# Patient Record
Sex: FEMALE | Race: WHITE
[De-identification: names, ages, dates, MRNs, and addresses within clinical notes are randomized per-mention and may not be internally consistent; named-entity substitution may affect disease eponyms.]

---

## 2017-08-04 ENCOUNTER — HOSPITAL ENCOUNTER (INPATIENT)
Dept: HOSPITAL 74 - JER | Age: 82
LOS: 6 days | Discharge: NURSING, REHAB OR LONG-TERM CARE | DRG: 964 | End: 2017-08-10
Attending: SPECIALIST | Admitting: SPECIALIST
Payer: COMMERCIAL

## 2017-08-04 VITALS — BODY MASS INDEX: 20.7 KG/M2

## 2017-08-04 DIAGNOSIS — D72.829: ICD-10-CM

## 2017-08-04 DIAGNOSIS — M13.0: ICD-10-CM

## 2017-08-04 DIAGNOSIS — S50.311A: ICD-10-CM
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
## 2017-08-04 DIAGNOSIS — I10: ICD-10-CM

## 2017-08-04 DIAGNOSIS — S06.300A: Primary | ICD-10-CM

## 2017-08-04 DIAGNOSIS — Z86.73: ICD-10-CM

## 2017-08-04 DIAGNOSIS — Z66: ICD-10-CM

## 2017-08-04 DIAGNOSIS — E87.1: ICD-10-CM

## 2017-08-04 DIAGNOSIS — L97.821: ICD-10-CM

## 2017-08-04 DIAGNOSIS — S72.414A: ICD-10-CM

## 2017-08-04 DIAGNOSIS — Z96.653: ICD-10-CM
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
## 2017-08-04 DIAGNOSIS — I87.8: ICD-10-CM

## 2017-08-04 DIAGNOSIS — L97.811: ICD-10-CM
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
## 2017-08-04 DIAGNOSIS — R19.7: ICD-10-CM

## 2017-08-04 LAB
ALBUMIN SERPL-MCNC: 3.6 G/DL (ref 3.4–5)
ALP SERPL-CCNC: 62 U/L (ref 45–117)
ALT SERPL-CCNC: 22 U/L (ref 12–78)
ANION GAP SERPL CALC-SCNC: 6 MMOL/L (ref 8–16)
AST SERPL-CCNC: 21 U/L (ref 15–37)
BASOPHILS # BLD: 0.7 % (ref 0–2)
BILIRUB SERPL-MCNC: 0.2 MG/DL (ref 0.2–1)
CALCIUM SERPL-MCNC: 8.9 MG/DL (ref 8.5–10.1)
CK SERPL-CCNC: 42 IU/L (ref 26–192)
CO2 SERPL-SCNC: 29 MMOL/L (ref 21–32)
CREAT SERPL-MCNC: 0.7 MG/DL (ref 0.55–1.02)
DEPRECATED RDW RBC AUTO: 12.3 % (ref 11.6–15.6)
EOSINOPHIL # BLD: 2.2 % (ref 0–4.5)
GLUCOSE SERPL-MCNC: 97 MG/DL (ref 74–106)
INR BLD: 0.98 (ref 0.82–1.09)
MCH RBC QN AUTO: 33.9 PG (ref 25.7–33.7)
MCHC RBC AUTO-ENTMCNC: 33.8 G/DL (ref 32–36)
MCV RBC: 100.1 FL (ref 80–96)
NEUTROPHILS # BLD: 73.9 % (ref 42.8–82.8)
PLATELET # BLD AUTO: 229 K/MM3 (ref 134–434)
PMV BLD: 8.5 FL (ref 7.5–11.1)
PROT SERPL-MCNC: 6.8 G/DL (ref 6.4–8.2)
PT PNL PPP: 10.8 SEC (ref 9.98–11.88)
TROPONIN I SERPL-MCNC: < 0.02 NG/ML (ref 0–0.05)
WBC # BLD AUTO: 9.9 K/MM3 (ref 4–10)

## 2017-08-04 NOTE — PDOC
History of Present Illness





- General


Chief Complaint: Injury


Stated Complaint: FALL


Time Seen by Provider: 08/04/17 21:13





- History of Present Illness


Initial Comments: 


08/04/17 22:14


85F w/ hx of b/l hip and knee replacements and polyarthritis presenting after 

fall. Pt reports that she fell down using her rollader to walk from her bed to 

bathroom. She denies any dizziness, SOB, nausea, or LOC before or after fall. 

She states that she hit the back of her head and right knee, and reports severe 

pain when flexing the right leg preventing her from walking. 





08/04/17 22:24








Past History





- Past Medical History


Allergies/Adverse Reactions: 


 Allergies











Allergy/AdvReac Type Severity Reaction Status Date / Time


 


No Known Allergies Allergy   Verified 08/04/17 21:26











Home Medications: 


Ambulatory Orders





Calcium Polycarbophil [Fibercon] 625 mg PO BID 06/09/14 


Gabapentin [Neurontin -] 400 mg PO Q8H 06/09/14 


Gluc HCl/Csana/Gly-Am-Gly,Mx/C [Cosamin Ds Capsule] 1 each PO BID 06/09/14 


Modafinil [Provigil] 200 mg PO BID 06/09/14 


Sennosides [Senna -] 1 mg PO HS 06/09/14 


Simethicone [Gas Relief] 80 mg PO QID 06/09/14 


Metaxalone 1 tab PO BID 11/07/14 


Hydromorphone [Dilaudid -] 1 tab PO BID PRN 12/02/16 


Amlodipine Besylate 2.5 mg PO DAILY 02/10/17 


Celebrex 100 mg PO BID 03/31/17 


Vitamin C 500 mg PO DAILY 03/31/17 


Vitamin D3 - 1 tab PO DAILY 03/31/17 


Zofran 4 mg PO TID PRN 03/31/17 


Acidoph/L.bulg/Bif.b/S.thermop [Patricia-Bid Caplet] 1 cap PO BID 05/12/17 








Anemia: No


Asthma: No


Cancer: No


Cardiac Disorders: No


CVA: No


COPD: No


CHF: No


Dementia: No


Diabetes: No


GI Disorders: No


 Disorders: No


HTN: Yes


Hypercholesterolemia: No


Liver Disease: No


Seizures: No


Thyroid Disease: No


Comment:: 





08/04/17 22:18


PMH:


hemochromatosis


HTN


arthritis


constipation


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





PSH:


cholecystectomy


b/l knee and hip replacements


PUD vagectomy


melanoma in-situ excision





Allergies: NKDA





Fam Hx: hemochromatosis in older brother











- Surgical History


Abdominal Surgery: Yes (apendectomy 10/2014)


Appendectomy: No


Cardiac Surgery: No


Cholecystectomy: No


Lung Surgery: No


Neurologic Surgery: No


Orthopedic Surgery: Yes (right hip replacement ./ rt knee replacement)





- Immunization History


Immunization Up to Date: Yes





- Psycho/Social/Smoking Cessation Hx


Suicidal Ideation: No


Smoking History: Never smoked


Have you smoked in the past 12 months: No





**Review of Systems





- Review of Systems


Comments:: 





08/04/17 22:19


GENERAL: No fever, chills, night sweats


HEAD, EYES, EARS, NOSE AND THROAT: No change in vision, ear pain, or sore throat


CARDIOVASCULAR: No chest pain or palpitations


RESPIRATORY: No cough, wheezing, or hemoptysis.


GASTROINTESTINAL: No nausea, vomiting, diarrhea, constipation, or blood in the 

stool.


GENITOURINARY: No dysuria, frequency, or urgency


MUSCULOSKELETAL: + joint swelling and pain. 


SKIN: wet growth on right lateral ankle bandaged up


ENDOCRINE: No increased thirst. No abnormal weight change


NEUROLOGIC: No dizziness,no  loss of consciousness





*Physical Exam





- Vital Signs


 Last Vital Signs











Temp Pulse Resp BP Pulse Ox


 


 97.8 F   75   20   151/87   96 


 


 08/04/17 21:24  08/04/17 21:24  08/04/17 21:24  08/04/17 21:24  08/04/17 21:24














- Physical Exam


Comments: 


08/04/17 22:22


GENERAL: Awake, alert, and fully oriented, in no acute distress


HEAD: swelling on posterior head


HEENT: PERRLA, EOMI, 


NECK: Normal ROM, supple, no lymphadenopathy, JVD, or masses


HEART: Regular rate and rhythm, normal S1 and S2, no murmurs, rubs or gallops, 

peripheral pulses normal and equal bilaterally.


LUNGS: CTAB, no wheezing, no rales


ABDOMEN: Soft, nontender, nondistended, normoactive bowel sounds.  No guarding, 

no rebound.  No masses


EXTREMITIES: right knee appears swollen compared to left, very tender to touch 

mostly on lateral side, unable to flex without severe pain. R elbow has minor 

abrasion. 


NEUROLOGICAL: Cranial nerves II through XII grossly intact.  Normal speech, no 

focal sensorimotor deficits





08/04/17 22:26








ED Treatment Course





- LABORATORY


CBC & Chemistry Diagram: 


 08/04/17 21:59





 08/04/17 21:59





- RADIOLOGY


Radiology Studies Ordered: 














 Category Date Time Status


 


 HEAD CT WITHOUT CONTRAST [CT] Stat CT Scan  08/04/17 21:55 Ordered


 


 KNEE 4 POS-RIGHT [RAD] Stat Radiology  08/04/17 21:54 Taken














Medical Decision Making





- Medical Decision Making


08/04/17 22:24


85F w/ hx of b/l hip and knee replacements and polyarthritis presenting after 

falling on head and right knee. Likely due to instability of joints. r/o 

cardiac causes of fall, intracranial bleeding, and right knee fracture. 





-head CT: shows a small hyperdensity suspicious for acute hemorrhage. 


-right knee XR: can't rule out fracture, so ordered knee CT


-CBC: wnl


-ferritin


-CMP: wnl


-EKG: normal sinus rhythm


-CT Knee: showed distal femoral condyle fracture and hemarthrosis





-spoke to Dr. Ba, will admit to monitor floor.


-consult orthopedic surgery for knee fracture


-touched base with neurosurgery who will see pt in morning





08/04/17 23:38








08/05/17 02:06








*DC/Admit/Observation/Transfer


Diagnosis at time of Disposition: 


 Knee fracture, right





- Discharge Dispostion


Condition at time of disposition: Stable


Admit: Yes


Decision to Admit order Date/Time: 





08/05/17 02:12

## 2017-08-05 LAB
ALBUMIN SERPL-MCNC: 3.2 G/DL (ref 3.4–5)
ALP SERPL-CCNC: 69 U/L (ref 45–117)
ALT SERPL-CCNC: 17 U/L (ref 12–78)
ANION GAP SERPL CALC-SCNC: 8 MMOL/L (ref 8–16)
AST SERPL-CCNC: 19 U/L (ref 15–37)
BILIRUB SERPL-MCNC: 0.5 MG/DL (ref 0.2–1)
CALCIUM SERPL-MCNC: 8.4 MG/DL (ref 8.5–10.1)
CO2 SERPL-SCNC: 31 MMOL/L (ref 21–32)
CREAT SERPL-MCNC: 0.6 MG/DL (ref 0.55–1.02)
DEPRECATED RDW RBC AUTO: 11.9 % (ref 11.6–15.6)
GLUCOSE SERPL-MCNC: 100 MG/DL (ref 74–106)
MCH RBC QN AUTO: 33.5 PG (ref 25.7–33.7)
MCHC RBC AUTO-ENTMCNC: 33.5 G/DL (ref 32–36)
MCV RBC: 100.2 FL (ref 80–96)
PLATELET # BLD AUTO: 171 K/MM3 (ref 134–434)
PMV BLD: 7.9 FL (ref 7.5–11.1)
PROT SERPL-MCNC: 6.2 G/DL (ref 6.4–8.2)
WBC # BLD AUTO: 7.7 K/MM3 (ref 4–10)

## 2017-08-05 PROCEDURE — 2W3QXYZ IMMOBILIZATION OF RIGHT LOWER LEG USING OTHER DEVICE: ICD-10-PCS

## 2017-08-05 RX ADMIN — SODIUM CHLORIDE SCH MLS/HR: 9 INJECTION, SOLUTION INTRAVENOUS at 21:00

## 2017-08-05 RX ADMIN — SIMETHICONE CHEW TAB 80 MG SCH: 80 TABLET ORAL at 18:43

## 2017-08-05 RX ADMIN — AMLODIPINE BESYLATE SCH MG: 2.5 TABLET ORAL at 15:24

## 2017-08-05 RX ADMIN — SIMETHICONE CHEW TAB 80 MG SCH: 80 TABLET ORAL at 17:25

## 2017-08-05 RX ADMIN — GABAPENTIN SCH MG: 400 CAPSULE ORAL at 15:24

## 2017-08-05 RX ADMIN — HYDROMORPHONE HYDROCHLORIDE PRN MG: 2 INJECTION, SOLUTION INTRAMUSCULAR; INTRAVENOUS; SUBCUTANEOUS at 21:48

## 2017-08-05 RX ADMIN — SIMETHICONE CHEW TAB 80 MG SCH MG: 80 TABLET ORAL at 21:51

## 2017-08-05 RX ADMIN — GENTAMICIN SULFATE SCH APPLIC: 1 CREAM TOPICAL at 17:23

## 2017-08-05 RX ADMIN — CELECOXIB SCH MG: 100 CAPSULE ORAL at 21:51

## 2017-08-05 RX ADMIN — SODIUM CHLORIDE SCH MLS/HR: 9 INJECTION, SOLUTION INTRAVENOUS at 13:30

## 2017-08-05 RX ADMIN — BACITRACIN ZINC SCH APPLIC: 500 OINTMENT TOPICAL at 17:24

## 2017-08-05 RX ADMIN — SIMETHICONE CHEW TAB 80 MG SCH MG: 80 TABLET ORAL at 15:24

## 2017-08-05 RX ADMIN — SENNOSIDES SCH TAB: 8.6 TABLET, FILM COATED ORAL at 21:51

## 2017-08-05 RX ADMIN — GABAPENTIN SCH MG: 400 CAPSULE ORAL at 21:51

## 2017-08-05 RX ADMIN — HYDROMORPHONE HYDROCHLORIDE PRN MG: 2 INJECTION, SOLUTION INTRAMUSCULAR; INTRAVENOUS; SUBCUTANEOUS at 14:24

## 2017-08-05 NOTE — CON.ORTH
Consult


Reason for Consultation:: right distal femur fx- s/p tkr





- Past Medical History


...Pregnant: No





- Alcohol/Substance Use


Hx Alcohol Use: No





- Smoking History


Smoking history: Never smoked


Have you smoked in the past 12 months: No





Home Medications





- Allergies


Allergies/Adverse Reactions: 


 Allergies











Allergy/AdvReac Type Severity Reaction Status Date / Time


 


No Known Allergies Allergy   Verified 08/04/17 21:26














- Home Medications


Home Medications: 


Ambulatory Orders





Calcium Polycarbophil [Fibercon] 625 mg PO BID 06/09/14 


Gabapentin [Neurontin -] 400 mg PO Q8H 06/09/14 


Gluc HCl/Csana/Gly-Am-Gly,Mx/C [Cosamin Ds Capsule] 1 each PO BID 06/09/14 


Modafinil [Provigil] 200 mg PO BID 06/09/14 


Sennosides [Senna -] 1 mg PO HS 06/09/14 


Simethicone [Gas Relief] 80 mg PO QID 06/09/14 


Metaxalone 1 tab PO BID 11/07/14 


Hydromorphone [Dilaudid -] 1 tab PO BID PRN 12/02/16 


Amlodipine Besylate 2.5 mg PO DAILY 02/10/17 


Celebrex 100 mg PO BID 03/31/17 


Vitamin C 500 mg PO DAILY 03/31/17 


Vitamin D3 - 1 tab PO DAILY 03/31/17 


Zofran 4 mg PO TID PRN 03/31/17 


Acidoph/L.bulg/Bif.b/S.thermop [Patricia-Bid Caplet] 1 cap PO BID 05/12/17 











Physical Exam for Ortho


Vital Signs: 


 Vital Signs











Temperature  97.9 F   08/05/17 06:16


 


Pulse Rate  70   08/05/17 06:16


 


Respiratory Rate  18   08/05/17 10:00


 


Blood Pressure  119/57   08/05/17 06:16


 


O2 Sat by Pulse Oximetry (%)  95   08/05/17 10:00











Labs: 


 INR, PTT











INR  0.98  (0.82-1.09)   08/04/17  21:59    














- Lower Extremity


Knee: Yes: Right, Limited ROM, Pain, Swelling, Tenderness, Other (nvi)





Imaging





- Results


X-ray: Report Reviewed, Image Reviewed


Cat Scan: Report Reviewed, Image Reviewed





Assessment/Plan


85F w/ hx of b/l hip and knee replacements s/p fall yesterday. Pt reports that 

she fell down using her rollader to walk from her bed to bathroom. She denies 

any dizziness, SOB, nausea, or LOC before or after fall. h/o right tkr 2006 by 

Dr. Rodriguez.





a/p- right nondisplaced periprosthetic distal femur fx


Risks and benefits were d/w pt in detail


No surgical intervention at the present time


Knee immobilizer at all times- please check for skin breakdown daily as pt has h

/o non-healing ulcers


NWB right LE


dvt ppx


pain control


will follow


d/w DR. Watts

## 2017-08-05 NOTE — HP
Admitting History and Physical





- Primary Care Physician


PCP: Indra Andujar





- Admission


Chief Complaint: Right knee pain.  Fall, head trauma


History of Present Illness: 


Pt is a resident of [facility], walking in her room yesterday with walker, 

turned and lost her footing and fell; she  hit her head. Pt was c/o of 

significant pain in her right knee, not able to stand or walk. Pt was sent to 

ER where was found to ICB and fracture of the right knee. Pt was admitted to 

monitor bed.


History Source: Patient





- Past Medical History


CNS: Yes: CVA


...Pregnant: No


Dermatology: Yes: Other (right and left leg ulcers)





- Advance Directives


Advance Directives: Yes: Health Care Proxy, DNR, MOLST





- Smoking History


Smoking history: Never smoked


Have you smoked in the past 12 months: No





- Alcohol/Substance Use


Hx Alcohol Use: No





Home Medications





- Allergies


Allergies/Adverse Reactions: 


 Allergies











Allergy/AdvReac Type Severity Reaction Status Date / Time


 


No Known Allergies Allergy   Verified 08/04/17 21:26














- Home Medications


Home Medications: 


Ambulatory Orders





Calcium Polycarbophil [Fibercon] 625 mg PO BID 06/09/14 


Gabapentin [Neurontin -] 400 mg PO Q8H 06/09/14 


Gluc HCl/Csana/Gly-Am-Gly,Mx/C [Cosamin Ds Capsule] 1 each PO BID 06/09/14 


Modafinil [Provigil] 200 mg PO BID 06/09/14 


Sennosides [Senna -] 1 mg PO HS 06/09/14 


Simethicone [Gas Relief] 80 mg PO QID 06/09/14 


Metaxalone 1 tab PO BID 11/07/14 


Hydromorphone [Dilaudid -] 1 tab PO BID PRN 12/02/16 


Amlodipine Besylate 2.5 mg PO DAILY 02/10/17 


Celebrex 100 mg PO BID 03/31/17 


Vitamin C 500 mg PO DAILY 03/31/17 


Vitamin D3 - 1 tab PO DAILY 03/31/17 


Zofran 4 mg PO TID PRN 03/31/17 


Acidoph/L.bulg/Bif.b/S.thermop [Patricia-Bid Caplet] 1 cap PO BID 05/12/17 











Review of Systems





- Review of Systems


Constitutional: denies: Chills, Fever


Eyes: denies: Blurred Vision, Double Vision


HENT: denies: Difficult Swallowing, Epistaxis, Hearing Loss, Throat Pain


Neck: denies: Pain on Movement, Stiffness


Cardiovascular: denies: Chest Pain, Edema, Palpitations


Respiratory: denies: Cough, SOB, Wheezing


Gastrointestinal: denies: Abdominal Pain, Constipation, Diarrhea


Genitourinary: denies: Burning, Discharge, Frequency


Musculoskeletal: reports: Other (right knee pain bilat shoulder pain (old)).  

denies: Back Pain


Neurological: denies: Change in LOC, Change in Speech, Confusion, Dizziness, 

Headache, Weakness


Endocrine: denies: Excessive Sweating, Intolerance to Cold


Hematology/Lymphatic: denies: Easily Bruised, Excessive Bleeding


Psychiatric: denies: Anxiety, Depression





Physical Examination


Vital Signs: 


 Vital Signs











Temperature  97.9 F   08/05/17 06:16


 


Pulse Rate  70   08/05/17 06:16


 


Respiratory Rate  18   08/05/17 10:00


 


Blood Pressure  119/57   08/05/17 06:16


 


O2 Sat by Pulse Oximetry (%)  95   08/05/17 10:00











Constitutional: Yes: No Distress, Calm


Eyes: Yes: Conjunctiva Clear, PERRL


HENT: Yes: Normocephalic.  No: Epistaxis, Rhinnorhea


Neck: Yes: Trachea Midline.  No: Lymphadenopathy


Cardiovascular: Yes: Regular Rate and Rhythm, S1, S2


Respiratory: Yes: Regular, CTA Bilaterally.  No: Rales


Gastrointestinal: Yes: Normal Bowel Sounds, Soft.  No: Palpable Mass, Tenderness


...Rectal Exam: Yes: Deferred


Renal/: No: CVA Tenderness - Left, CVA Tenderness - Right


Breast(s): Yes: Other (deferred)


Musculoskeletal: Yes: Other (right knee swallon, with)


Edema: No


Neurological: Yes: Alert, Oriented, Cran Nerves II-XII Intact


Labs: 


reviewed





Imaging





- Results


Cat Scan: Report Reviewed





Problem List





- Problems


(1) Knee fracture, right


Code(s): [id number] - 





(2) ICB (intracranial bleed)


Code(s): I62.9 - NONTRAUMATIC INTRACRANIAL HEMORRHAGE, UNSPECIFIED





(3) CVA (cerebral vascular accident)


Code(s): I63.9 - CEREBRAL INFARCTION, UNSPECIFIED   





(4) Bilateral leg ulcer


Code(s): L97.919 - NON-PRS CHRONIC ULC UNSP PRT OF R LOW LEG W UNSP SEVERITY


L97.929 - NON-PRS CHRONIC ULC UNSP PRT OF L LOW LEG W UNSP SEVERITY   








Assessment/Plan


Neuro SX consult


Ortho consult


NPO untill clear by surgeons


IVF


Repeate labs


Repeate Head CT scan

## 2017-08-05 NOTE — CONSULT
Consult - text type





- Consultation


Consultation Note: 


Asked to see this 85 year old  female who had a fall yesterday at her 

Skilled Nursing Facility. Patient struck her head. Aside from Orthopaedic 

injury to Right lower extremity, patient is at her Neurological baseline and 

was bright and alert upon my examination. Head CT with focal hyperdenisity in 

the region of the Right Caudate head. Repeat CT does not demonstrate 

significant change/progression. At this point, I do not feel that any acute 

Neurosurgical intervention is indicated. Patient may resume normal diet and 

does not require additional imaging from Neurosurgery standpoint.

## 2017-08-06 LAB
ALBUMIN SERPL-MCNC: 2.7 G/DL (ref 3.4–5)
ALP SERPL-CCNC: 56 U/L (ref 45–117)
ALT SERPL-CCNC: 15 U/L (ref 12–78)
ANION GAP SERPL CALC-SCNC: 7 MMOL/L (ref 8–16)
AST SERPL-CCNC: 15 U/L (ref 15–37)
BILIRUB SERPL-MCNC: 0.5 MG/DL (ref 0.2–1)
CALCIUM SERPL-MCNC: 8.1 MG/DL (ref 8.5–10.1)
CO2 SERPL-SCNC: 28 MMOL/L (ref 21–32)
CREAT SERPL-MCNC: 0.7 MG/DL (ref 0.55–1.02)
DEPRECATED RDW RBC AUTO: 11.9 % (ref 11.6–15.6)
GLUCOSE SERPL-MCNC: 86 MG/DL (ref 74–106)
MCH RBC QN AUTO: 33.8 PG (ref 25.7–33.7)
MCHC RBC AUTO-ENTMCNC: 33.3 G/DL (ref 32–36)
MCV RBC: 101.5 FL (ref 80–96)
PLATELET # BLD AUTO: 165 K/MM3 (ref 134–434)
PMV BLD: 8.4 FL (ref 7.5–11.1)
PROT SERPL-MCNC: 5.2 G/DL (ref 6.4–8.2)
WBC # BLD AUTO: 8.3 K/MM3 (ref 4–10)

## 2017-08-06 RX ADMIN — OXYCODONE HYDROCHLORIDE AND ACETAMINOPHEN SCH MG: 500 TABLET ORAL at 10:47

## 2017-08-06 RX ADMIN — BACITRACIN ZINC SCH APPLIC: 500 OINTMENT TOPICAL at 14:39

## 2017-08-06 RX ADMIN — SIMETHICONE CHEW TAB 80 MG SCH MG: 80 TABLET ORAL at 18:34

## 2017-08-06 RX ADMIN — HYDROMORPHONE HYDROCHLORIDE PRN MG: 2 INJECTION, SOLUTION INTRAMUSCULAR; INTRAVENOUS; SUBCUTANEOUS at 06:16

## 2017-08-06 RX ADMIN — GABAPENTIN SCH MG: 400 CAPSULE ORAL at 21:42

## 2017-08-06 RX ADMIN — HYDROMORPHONE HYDROCHLORIDE PRN MG: 2 INJECTION, SOLUTION INTRAMUSCULAR; INTRAVENOUS; SUBCUTANEOUS at 18:37

## 2017-08-06 RX ADMIN — AMLODIPINE BESYLATE SCH MG: 2.5 TABLET ORAL at 10:47

## 2017-08-06 RX ADMIN — SIMETHICONE CHEW TAB 80 MG SCH MG: 80 TABLET ORAL at 10:47

## 2017-08-06 RX ADMIN — GENTAMICIN SULFATE SCH APPLIC: 1 CREAM TOPICAL at 14:39

## 2017-08-06 RX ADMIN — CELECOXIB SCH MG: 100 CAPSULE ORAL at 10:47

## 2017-08-06 RX ADMIN — SIMETHICONE CHEW TAB 80 MG SCH MG: 80 TABLET ORAL at 21:42

## 2017-08-06 RX ADMIN — HYDROMORPHONE HYDROCHLORIDE PRN MG: 2 INJECTION, SOLUTION INTRAMUSCULAR; INTRAVENOUS; SUBCUTANEOUS at 12:22

## 2017-08-06 RX ADMIN — SENNOSIDES SCH TAB: 8.6 TABLET, FILM COATED ORAL at 21:42

## 2017-08-06 RX ADMIN — SIMETHICONE CHEW TAB 80 MG SCH MG: 80 TABLET ORAL at 14:38

## 2017-08-06 RX ADMIN — GABAPENTIN SCH MG: 400 CAPSULE ORAL at 14:39

## 2017-08-06 RX ADMIN — SODIUM CHLORIDE SCH MLS/HR: 9 INJECTION, SOLUTION INTRAVENOUS at 14:39

## 2017-08-06 RX ADMIN — GABAPENTIN SCH MG: 400 CAPSULE ORAL at 06:16

## 2017-08-06 RX ADMIN — CELECOXIB SCH MG: 100 CAPSULE ORAL at 21:43

## 2017-08-06 NOTE — EKG
Test Reason : 

Blood Pressure : ***/*** mmHG

Vent. Rate : 069 BPM     Atrial Rate : 069 BPM

   P-R Int : 136 ms          QRS Dur : 094 ms

    QT Int : 394 ms       P-R-T Axes : 023 -29 030 degrees

   QTc Int : 422 ms

 

NORMAL SINUS RHYTHM

VOLTAGE CRITERIA FOR LEFT VENTRICULAR HYPERTROPHY

ABNORMAL ECG

NO PREVIOUS ECGS AVAILABLE

Confirmed by GIAN DE LA VEGA MD ([id number]) on 8/6/2017 8:18:26 AM

 

Referred By:             Confirmed By:GIAN DE LA VEGA MD

## 2017-08-07 LAB
ANION GAP SERPL CALC-SCNC: 7 MMOL/L (ref 8–16)
BASOPHILS # BLD: 0.5 % (ref 0–2)
CALCIUM SERPL-MCNC: 7.7 MG/DL (ref 8.5–10.1)
CO2 SERPL-SCNC: 25 MMOL/L (ref 21–32)
CREAT SERPL-MCNC: 0.6 MG/DL (ref 0.55–1.02)
DEPRECATED RDW RBC AUTO: 12.2 % (ref 11.6–15.6)
DEPRECATED RDW RBC AUTO: 12.3 % (ref 11.6–15.6)
EOSINOPHIL # BLD: 1.8 % (ref 0–4.5)
GLUCOSE SERPL-MCNC: 88 MG/DL (ref 74–106)
MCH RBC QN AUTO: 34 PG (ref 25.7–33.7)
MCH RBC QN AUTO: 34.3 PG (ref 25.7–33.7)
MCHC RBC AUTO-ENTMCNC: 33.4 G/DL (ref 32–36)
MCHC RBC AUTO-ENTMCNC: 33.8 G/DL (ref 32–36)
MCV RBC: 101.5 FL (ref 80–96)
MCV RBC: 101.9 FL (ref 80–96)
NEUTROPHILS # BLD: 86 % (ref 42.8–82.8)
PLATELET # BLD AUTO: 159 K/MM3 (ref 134–434)
PLATELET # BLD AUTO: 160 K/MM3 (ref 134–434)
PMV BLD: 8.6 FL (ref 7.5–11.1)
PMV BLD: 8.7 FL (ref 7.5–11.1)
WBC # BLD AUTO: 12 K/MM3 (ref 4–10)
WBC # BLD AUTO: 9.5 K/MM3 (ref 4–10)

## 2017-08-07 RX ADMIN — HEPARIN SODIUM SCH UNIT: 5000 INJECTION, SOLUTION INTRAVENOUS; SUBCUTANEOUS at 13:14

## 2017-08-07 RX ADMIN — GABAPENTIN SCH MG: 400 CAPSULE ORAL at 21:29

## 2017-08-07 RX ADMIN — HYDROMORPHONE HYDROCHLORIDE PRN MG: 2 INJECTION, SOLUTION INTRAMUSCULAR; INTRAVENOUS; SUBCUTANEOUS at 01:06

## 2017-08-07 RX ADMIN — SIMETHICONE CHEW TAB 80 MG SCH MG: 80 TABLET ORAL at 10:37

## 2017-08-07 RX ADMIN — SIMETHICONE CHEW TAB 80 MG SCH MG: 80 TABLET ORAL at 21:29

## 2017-08-07 RX ADMIN — SIMETHICONE CHEW TAB 80 MG SCH MG: 80 TABLET ORAL at 18:47

## 2017-08-07 RX ADMIN — SODIUM CHLORIDE SCH MLS/HR: 9 INJECTION, SOLUTION INTRAVENOUS at 13:11

## 2017-08-07 RX ADMIN — CELECOXIB SCH MG: 100 CAPSULE ORAL at 10:38

## 2017-08-07 RX ADMIN — AMLODIPINE BESYLATE SCH MG: 2.5 TABLET ORAL at 10:37

## 2017-08-07 RX ADMIN — CELECOXIB SCH MG: 100 CAPSULE ORAL at 21:29

## 2017-08-07 RX ADMIN — GENTAMICIN SULFATE SCH APPLIC: 1 CREAM TOPICAL at 10:37

## 2017-08-07 RX ADMIN — SENNOSIDES SCH: 8.6 TABLET, FILM COATED ORAL at 21:45

## 2017-08-07 RX ADMIN — GABAPENTIN SCH MG: 400 CAPSULE ORAL at 06:10

## 2017-08-07 RX ADMIN — BACITRACIN ZINC SCH APPLIC: 500 OINTMENT TOPICAL at 10:37

## 2017-08-07 RX ADMIN — GABAPENTIN SCH MG: 400 CAPSULE ORAL at 13:39

## 2017-08-07 RX ADMIN — SIMETHICONE CHEW TAB 80 MG SCH MG: 80 TABLET ORAL at 13:39

## 2017-08-07 RX ADMIN — OXYCODONE HYDROCHLORIDE AND ACETAMINOPHEN SCH MG: 500 TABLET ORAL at 10:37

## 2017-08-07 NOTE — CONSULT
Consult





- Past Medical History


CNS: Yes: CVA


...Pregnant: No


Dermatology: Yes: Other (right and left leg ulcers)





- Alcohol/Substance Use


Hx Alcohol Use: No





- Smoking History


Smoking history: Never smoked


Have you smoked in the past 12 months: No





Home Medications





- Allergies


Allergies/Adverse Reactions: 


 Allergies











Allergy/AdvReac Type Severity Reaction Status Date / Time


 


No Known Allergies Allergy   Verified 08/04/17 21:26














- Home Medications


Home Medications: 


Ambulatory Orders





Calcium Polycarbophil [Fibercon] 625 mg PO BID 06/09/14 


Gabapentin [Neurontin -] 400 mg PO Q8H 06/09/14 


Gluc HCl/Csana/Gly-Am-Gly,Mx/C [Cosamin Ds Capsule] 1 each PO BID 06/09/14 


Modafinil [Provigil] 200 mg PO BID 06/09/14 


Sennosides [Senna -] 1 mg PO HS 06/09/14 


Simethicone [Gas Relief] 80 mg PO QID 06/09/14 


Metaxalone 1 tab PO BID 11/07/14 


Hydromorphone [Dilaudid -] 1 tab PO BID PRN 12/02/16 


Amlodipine Besylate 2.5 mg PO DAILY 02/10/17 


Celebrex 100 mg PO BID 03/31/17 


Vitamin C 500 mg PO DAILY 03/31/17 


Vitamin D3 - 1 tab PO DAILY 03/31/17 


Zofran 4 mg PO TID PRN 03/31/17 


Acidoph/L.bulg/Bif.b/S.thermop [Patricia-Bid Caplet] 1 cap PO BID 05/12/17 











Physical Exam


Vital Signs: 


 Vital Signs











Temperature  99.5 F   08/07/17 18:00


 


Pulse Rate  95 H  08/07/17 18:00


 


Respiratory Rate  18   08/07/17 18:00


 


Blood Pressure  127/54   08/07/17 18:00


 


O2 Sat by Pulse Oximetry (%)  100   08/07/17 10:26











Labs: 


 CBC, BMP





 08/07/17 18:00 





 08/07/17 05:15 











Assessment/Plan


VAscular Surgery 





85F w/ hx of b/l hip and knee replacements and polyarthritis presenting after 

fall. Pt reports that she fell down using her rollader to walk from her bed to 

bathroom. She denies any dizziness, SOB, nausea, or LOC before or after fall. 

She states that she hit the back of her head and right knee, and reports severe 

pain when flexing the right leg preventing her from walking. 





08/04/17 22:24








Past History





- Past Medical History


Allergies/Adverse Reactions: 


 Allergies











Allergy/AdvReac Type Severity Reaction Status Date / Time


 


No Known Allergies Allergy   Verified 08/04/17 21:26











Home Medications: 


Ambulatory Orders





Calcium Polycarbophil [Fibercon] 625 mg PO BID 06/09/14 


Gabapentin [Neurontin -] 400 mg PO Q8H 06/09/14 


Gluc HCl/Csana/Gly-Am-Gly,Mx/C [Cosamin Ds Capsule] 1 each PO BID 06/09/14 


Modafinil [Provigil] 200 mg PO BID 06/09/14 


Sennosides [Senna -] 1 mg PO HS 06/09/14 


Simethicone [Gas Relief] 80 mg PO QID 06/09/14 


Metaxalone 1 tab PO BID 11/07/14 


Hydromorphone [Dilaudid -] 1 tab PO BID PRN 12/02/16 


Amlodipine Besylate 2.5 mg PO DAILY 02/10/17 


Celebrex 100 mg PO BID 03/31/17 


Vitamin C 500 mg PO DAILY 03/31/17 


Vitamin D3 - 1 tab PO DAILY 03/31/17 


Zofran 4 mg PO TID PRN 03/31/17 


Acidoph/L.bulg/Bif.b/S.thermop [Patricia-Bid Caplet] 1 cap PO BID 05/12/17 





PE


Head - NC/At


Lung - CTA


Heart - RRR


abd - soft,nt,nd


ext - left leg wound 


clean, pink, granulation. 


Wound on left leg -- recently diagnosed by dermatology to be skin cancer and 

needs to 


be removed as per pt. 





A/P 


Left lower ext wound . 


1. gentamycin daily to wound. 


2. Wound on left leg- found to be skin cancer. Needs to be removed by derm. 





Avila Hoyos DO

## 2017-08-08 LAB
DEPRECATED RDW RBC AUTO: 11.9 % (ref 11.6–15.6)
MCH RBC QN AUTO: 34.2 PG (ref 25.7–33.7)
MCHC RBC AUTO-ENTMCNC: 34 G/DL (ref 32–36)
MCV RBC: 100.3 FL (ref 80–96)
PLATELET # BLD AUTO: 179 K/MM3 (ref 134–434)
PMV BLD: 8.7 FL (ref 7.5–11.1)
WBC # BLD AUTO: 8.9 K/MM3 (ref 4–10)

## 2017-08-08 RX ADMIN — SIMETHICONE CHEW TAB 80 MG SCH MG: 80 TABLET ORAL at 13:10

## 2017-08-08 RX ADMIN — CELECOXIB SCH MG: 100 CAPSULE ORAL at 10:29

## 2017-08-08 RX ADMIN — SIMETHICONE CHEW TAB 80 MG SCH MG: 80 TABLET ORAL at 10:28

## 2017-08-08 RX ADMIN — GABAPENTIN SCH MG: 400 CAPSULE ORAL at 05:47

## 2017-08-08 RX ADMIN — SODIUM CHLORIDE SCH MLS/HR: 9 INJECTION, SOLUTION INTRAVENOUS at 13:11

## 2017-08-08 RX ADMIN — SIMETHICONE CHEW TAB 80 MG SCH MG: 80 TABLET ORAL at 18:19

## 2017-08-08 RX ADMIN — HYDROMORPHONE HYDROCHLORIDE PRN MG: 2 INJECTION, SOLUTION INTRAMUSCULAR; INTRAVENOUS; SUBCUTANEOUS at 06:43

## 2017-08-08 RX ADMIN — CELECOXIB SCH MG: 100 CAPSULE ORAL at 21:43

## 2017-08-08 RX ADMIN — OXYCODONE HYDROCHLORIDE AND ACETAMINOPHEN SCH MG: 500 TABLET ORAL at 10:28

## 2017-08-08 RX ADMIN — GABAPENTIN SCH MG: 400 CAPSULE ORAL at 13:10

## 2017-08-08 RX ADMIN — SENNOSIDES SCH TAB: 8.6 TABLET, FILM COATED ORAL at 21:05

## 2017-08-08 RX ADMIN — HYDROMORPHONE HYDROCHLORIDE PRN MG: 2 INJECTION, SOLUTION INTRAMUSCULAR; INTRAVENOUS; SUBCUTANEOUS at 13:13

## 2017-08-08 RX ADMIN — BACITRACIN ZINC SCH APPLIC: 500 OINTMENT TOPICAL at 10:29

## 2017-08-08 RX ADMIN — HYDROMORPHONE HYDROCHLORIDE PRN MG: 2 INJECTION, SOLUTION INTRAMUSCULAR; INTRAVENOUS; SUBCUTANEOUS at 18:24

## 2017-08-08 RX ADMIN — GENTAMICIN SULFATE SCH APPLIC: 1 CREAM TOPICAL at 11:00

## 2017-08-08 RX ADMIN — HEPARIN SODIUM SCH UNIT: 5000 INJECTION, SOLUTION INTRAVENOUS; SUBCUTANEOUS at 10:29

## 2017-08-08 RX ADMIN — GABAPENTIN SCH MG: 400 CAPSULE ORAL at 21:05

## 2017-08-08 RX ADMIN — HEPARIN SODIUM SCH UNIT: 5000 INJECTION, SOLUTION INTRAVENOUS; SUBCUTANEOUS at 21:04

## 2017-08-08 RX ADMIN — AMLODIPINE BESYLATE SCH MG: 2.5 TABLET ORAL at 10:28

## 2017-08-08 RX ADMIN — SIMETHICONE CHEW TAB 80 MG SCH MG: 80 TABLET ORAL at 21:05

## 2017-08-09 LAB
ANION GAP SERPL CALC-SCNC: 10 MMOL/L (ref 8–16)
ANION GAP SERPL CALC-SCNC: 6 MMOL/L (ref 8–16)
CALCIUM SERPL-MCNC: 8.2 MG/DL (ref 8.5–10.1)
CALCIUM SERPL-MCNC: 8.3 MG/DL (ref 8.5–10.1)
CO2 SERPL-SCNC: 29 MMOL/L (ref 21–32)
CO2 SERPL-SCNC: 31 MMOL/L (ref 21–32)
CREAT SERPL-MCNC: 0.4 MG/DL (ref 0.55–1.02)
CREAT SERPL-MCNC: 0.5 MG/DL (ref 0.55–1.02)
DEPRECATED RDW RBC AUTO: 12.1 % (ref 11.6–15.6)
GLUCOSE SERPL-MCNC: 104 MG/DL (ref 74–106)
GLUCOSE SERPL-MCNC: 98 MG/DL (ref 74–106)
MAGNESIUM SERPL-MCNC: 1.9 MG/DL (ref 1.8–2.4)
MCH RBC QN AUTO: 33.2 PG (ref 25.7–33.7)
MCHC RBC AUTO-ENTMCNC: 33.2 G/DL (ref 32–36)
MCV RBC: 99.9 FL (ref 80–96)
PLATELET # BLD AUTO: 220 K/MM3 (ref 134–434)
PMV BLD: 8.4 FL (ref 7.5–11.1)
WBC # BLD AUTO: 8 K/MM3 (ref 4–10)

## 2017-08-09 RX ADMIN — SIMETHICONE CHEW TAB 80 MG SCH: 80 TABLET ORAL at 20:07

## 2017-08-09 RX ADMIN — GABAPENTIN SCH MG: 400 CAPSULE ORAL at 21:30

## 2017-08-09 RX ADMIN — BACITRACIN ZINC SCH APPLIC: 500 OINTMENT TOPICAL at 09:38

## 2017-08-09 RX ADMIN — SIMETHICONE CHEW TAB 80 MG SCH MG: 80 TABLET ORAL at 09:41

## 2017-08-09 RX ADMIN — HEPARIN SODIUM SCH UNIT: 5000 INJECTION, SOLUTION INTRAVENOUS; SUBCUTANEOUS at 09:40

## 2017-08-09 RX ADMIN — SIMETHICONE CHEW TAB 80 MG SCH MG: 80 TABLET ORAL at 21:30

## 2017-08-09 RX ADMIN — HEPARIN SODIUM SCH UNIT: 5000 INJECTION, SOLUTION INTRAVENOUS; SUBCUTANEOUS at 21:31

## 2017-08-09 RX ADMIN — AMLODIPINE BESYLATE SCH MG: 2.5 TABLET ORAL at 09:41

## 2017-08-09 RX ADMIN — GENTAMICIN SULFATE SCH APPLIC: 1 CREAM TOPICAL at 09:39

## 2017-08-09 RX ADMIN — GABAPENTIN SCH MG: 400 CAPSULE ORAL at 13:23

## 2017-08-09 RX ADMIN — SIMETHICONE CHEW TAB 80 MG SCH MG: 80 TABLET ORAL at 17:30

## 2017-08-09 RX ADMIN — GABAPENTIN SCH MG: 400 CAPSULE ORAL at 05:46

## 2017-08-09 RX ADMIN — CELECOXIB SCH MG: 100 CAPSULE ORAL at 21:37

## 2017-08-09 RX ADMIN — SENNOSIDES SCH TAB: 8.6 TABLET, FILM COATED ORAL at 21:31

## 2017-08-09 RX ADMIN — OXYCODONE HYDROCHLORIDE AND ACETAMINOPHEN SCH MG: 500 TABLET ORAL at 09:41

## 2017-08-09 RX ADMIN — CELECOXIB SCH MG: 100 CAPSULE ORAL at 17:30

## 2017-08-10 VITALS — SYSTOLIC BLOOD PRESSURE: 147 MMHG | TEMPERATURE: 98.5 F | HEART RATE: 97 BPM | DIASTOLIC BLOOD PRESSURE: 69 MMHG

## 2017-08-10 LAB
ANION GAP SERPL CALC-SCNC: 7 MMOL/L (ref 8–16)
CALCIUM SERPL-MCNC: 8.3 MG/DL (ref 8.5–10.1)
CO2 SERPL-SCNC: 33 MMOL/L (ref 21–32)
CREAT SERPL-MCNC: 0.5 MG/DL (ref 0.55–1.02)
GLUCOSE SERPL-MCNC: 99 MG/DL (ref 74–106)

## 2017-08-10 RX ADMIN — OXYCODONE HYDROCHLORIDE AND ACETAMINOPHEN SCH MG: 500 TABLET ORAL at 10:05

## 2017-08-10 RX ADMIN — SIMETHICONE CHEW TAB 80 MG SCH MG: 80 TABLET ORAL at 14:08

## 2017-08-10 RX ADMIN — HEPARIN SODIUM SCH UNIT: 5000 INJECTION, SOLUTION INTRAVENOUS; SUBCUTANEOUS at 10:06

## 2017-08-10 RX ADMIN — BACITRACIN ZINC SCH APPLIC: 500 OINTMENT TOPICAL at 10:05

## 2017-08-10 RX ADMIN — SIMETHICONE CHEW TAB 80 MG SCH MG: 80 TABLET ORAL at 10:05

## 2017-08-10 RX ADMIN — GENTAMICIN SULFATE SCH APPLIC: 1 CREAM TOPICAL at 10:06

## 2017-08-10 RX ADMIN — GABAPENTIN SCH MG: 400 CAPSULE ORAL at 06:14

## 2017-08-10 RX ADMIN — AMLODIPINE BESYLATE SCH MG: 2.5 TABLET ORAL at 10:05

## 2017-08-10 RX ADMIN — GABAPENTIN SCH MG: 400 CAPSULE ORAL at 14:08

## 2017-08-10 RX ADMIN — CELECOXIB SCH MG: 100 CAPSULE ORAL at 10:05

## 2017-08-10 NOTE — DS
Physical Examination


Vital Signs: 


 Vital Signs











Temperature  97.6 F   08/10/17 10:00


 


Pulse Rate  92 H  08/10/17 10:00


 


Respiratory Rate  19   08/10/17 10:00


 


Blood Pressure  127/69   08/10/17 10:00


 


O2 Sat by Pulse Oximetry (%)  95   08/10/17 09:00











Findings/Remarks: 


Pt w/o fever, chill, SOB, CP, abd pain, N, V.


Pt c/o left 5-th toe pain 


Constitutional: Yes: No Distress, Calm


Cardiovascular: Yes: Regular Rate and Rhythm, S1, S2


Respiratory: Yes: Regular, CTA Bilaterally.  No: Rales


Gastrointestinal: Yes: Normal Bowel Sounds, Soft.  No: Tenderness


Edema: No


Neurological: Yes: Alert, Oriented


Labs: 


 CBC, BMP





 08/09/17 05:20 





 08/10/17 05:15 











Discharge Summary


Reason For Visit: KNEE FRACTURE ACUTE


Current Active Problems





Bilateral leg ulcer (Acute) 


CVA (cerebral vascular accident) (Acute) 


Diarrhea (Acute) 


ICB (intracranial bleed) (Acute) 


Knee fracture, right (Acute) 


Leukocytosis (Acute) 








Procedures: Principal: Head CT scan (X 2).  Leg CT scan


Hospital Course: 


Pt is a resident of [facility], using a walker for ambulation, turned and 

fell (per pt her leg gave up on her); pt hit her head. Pt was transfered to ER, 

herbert Head CT scan, positive for small periventricular white matter hyperdensity c

/w hemorrhage; Pt also had right leg CT scan c/w fracture of right lateral 

femoral condyle. Pt was seen by Neuro SX (Dr. KENDY Reddy), repeated Head CT 

scan showed stable hemorrhage; decision was made to treat pt medically. Pt was 

seen by Ortho ( Dr. Naomi Florian), no surgery needed, immobilizer was ordered, 

no weight bearing for 6 to 8 weeks. Pt with hypokalemia, corrected with 

supplementation and increased PO intake. Pt to be DC'ed to Rehab, with Ortho f/

u.


Condition: Stable





- Instructions


Diet, Activity, Other Instructions: 


resume diet


PT and OT


NO weight bearing on right leg for 6 to 8 weeks ( to f/u with ORTHO in 5 weeks)


Referrals: 


Indra Andujar MD [Staff Physician] - 


Lent,Hubert E, MD [Staff Physician] -  (in 5 weeks)





- Home Medications


Comprehensive Discharge Medication List: 


Ambulatory Orders





This might NOT be accurate





Calcium Polycarbophil [Fibercon] 625 mg PO BID 06/09/14 


Gabapentin [Neurontin -] 400 mg PO Q8H 06/09/14 


Gluc HCl/Csana/Gly-Am-Gly,Mx/C [Cosamin Ds Capsule] 1 each PO BID 06/09/14 


Modafinil [Provigil] 200 mg PO BID 06/09/14 


Sennosides [Senna -] 1 mg PO HS 06/09/14 


Simethicone [Gas Relief] 80 mg PO QID 06/09/14 


Metaxalone 1 tab PO BID 11/07/14 


Hydromorphone [Dilaudid -] 1 tab PO BID PRN 12/02/16 


Amlodipine Besylate 2.5 mg PO DAILY 02/10/17 


Celebrex 100 mg PO BID 03/31/17 


Vitamin C 500 mg PO DAILY 03/31/17 


Vitamin D3 - 1 tab PO DAILY 03/31/17 


Zofran 4 mg PO TID PRN 03/31/17 


Acidoph/L.bulg/Bif.b/S.thermop [Patricia-Bid Caplet] 1 cap PO BID 05/12/17

## 2018-01-15 ENCOUNTER — HOSPITAL ENCOUNTER (INPATIENT)
Dept: HOSPITAL 74 - JER | Age: 83
LOS: 12 days | Discharge: NURSING, REHAB OR LONG-TERM CARE | DRG: 981 | End: 2018-01-27
Attending: SPECIALIST | Admitting: SPECIALIST
Payer: COMMERCIAL

## 2018-01-15 VITALS — BODY MASS INDEX: 22.6 KG/M2

## 2018-01-15 DIAGNOSIS — I73.9: ICD-10-CM

## 2018-01-15 DIAGNOSIS — D64.9: ICD-10-CM

## 2018-01-15 DIAGNOSIS — E88.09: ICD-10-CM

## 2018-01-15 DIAGNOSIS — M86.8X7: ICD-10-CM

## 2018-01-15 DIAGNOSIS — J98.01: ICD-10-CM

## 2018-01-15 DIAGNOSIS — Z86.73: ICD-10-CM

## 2018-01-15 DIAGNOSIS — L89.514: Primary | ICD-10-CM

## 2018-01-15 DIAGNOSIS — I10: ICD-10-CM

## 2018-01-15 LAB
ALBUMIN SERPL-MCNC: 2 G/DL (ref 3.4–5)
ALP SERPL-CCNC: 198 U/L (ref 45–117)
ALT SERPL-CCNC: 11 U/L (ref 12–78)
ANION GAP SERPL CALC-SCNC: 12 MMOL/L (ref 8–16)
AST SERPL-CCNC: 19 U/L (ref 15–37)
BASOPHILS # BLD: 0.5 % (ref 0–2)
BILIRUB SERPL-MCNC: 0.1 MG/DL (ref 0.2–1)
BUN SERPL-MCNC: 21 MG/DL (ref 7–18)
CALCIUM SERPL-MCNC: 8.1 MG/DL (ref 8.5–10.1)
CHLORIDE SERPL-SCNC: 100 MMOL/L (ref 98–107)
CO2 SERPL-SCNC: 22 MMOL/L (ref 21–32)
CREAT SERPL-MCNC: 1.4 MG/DL (ref 0.55–1.02)
DEPRECATED RDW RBC AUTO: 13.7 % (ref 11.6–15.6)
EOSINOPHIL # BLD: 0.8 % (ref 0–4.5)
GLUCOSE SERPL-MCNC: 131 MG/DL (ref 74–106)
HCT VFR BLD CALC: 33.4 % (ref 32.4–45.2)
HGB BLD-MCNC: 11.1 GM/DL (ref 10.7–15.3)
LYMPHOCYTES # BLD: 7.1 % (ref 8–40)
MCH RBC QN AUTO: 31.6 PG (ref 25.7–33.7)
MCHC RBC AUTO-ENTMCNC: 33.2 G/DL (ref 32–36)
MCV RBC: 95.2 FL (ref 80–96)
MONOCYTES # BLD AUTO: 6 % (ref 3.8–10.2)
NEUTROPHILS # BLD: 85.6 % (ref 42.8–82.8)
PLATELET # BLD AUTO: 407 K/MM3 (ref 134–434)
PMV BLD: 7.9 FL (ref 7.5–11.1)
POTASSIUM SERPLBLD-SCNC: 4.8 MMOL/L (ref 3.5–5.1)
PROT SERPL-MCNC: 6 G/DL (ref 6.4–8.2)
RBC # BLD AUTO: 3.5 M/MM3 (ref 3.6–5.2)
SODIUM SERPL-SCNC: 134 MMOL/L (ref 136–145)
WBC # BLD AUTO: 11.1 K/MM3 (ref 4–10)

## 2018-01-15 PROCEDURE — G0480 DRUG TEST DEF 1-7 CLASSES: HCPCS

## 2018-01-15 PROCEDURE — C1751 CATH, INF, PER/CENT/MIDLINE: HCPCS

## 2018-01-15 RX ADMIN — ACETAMINOPHEN SCH: 325 TABLET ORAL at 23:21

## 2018-01-15 RX ADMIN — HEPARIN SODIUM SCH UNIT: 5000 INJECTION, SOLUTION INTRAVENOUS; SUBCUTANEOUS at 22:40

## 2018-01-15 RX ADMIN — SENNOSIDES SCH TAB: 8.6 TABLET, FILM COATED ORAL at 22:45

## 2018-01-15 RX ADMIN — GABAPENTIN SCH MG: 400 CAPSULE ORAL at 22:45

## 2018-01-15 RX ADMIN — SIMETHICONE CHEW TAB 80 MG SCH MG: 80 TABLET ORAL at 22:45

## 2018-01-15 RX ADMIN — SODIUM CHLORIDE SCH MLS/HR: 9 INJECTION, SOLUTION INTRAVENOUS at 19:41

## 2018-01-15 RX ADMIN — CELECOXIB SCH MG: 100 CAPSULE ORAL at 22:45

## 2018-01-15 RX ADMIN — ONDANSETRON HYDROCHLORIDE SCH MG: 4 TABLET, FILM COATED ORAL at 22:45

## 2018-01-15 RX ADMIN — DOCUSATE SODIUM LIQUID SCH MG: 100 LIQUID ORAL at 22:45

## 2018-01-15 NOTE — PDOC
History of Present Illness





<Rasheed Jones - Last Filed: 01/15/18 15:58>





- General


History Source: Patient





- History of Present Illness


Initial Comments: 





01/15/18 15:01





Patient is a 86 y.o. female with a PMH of HTN and Hemachromatosis who presents 

to our ED this afternoon at the behest of Dr. Hoyos for a non-healing RLE 

wound.  Patient states the wound appeared 1 month previous and she was 

initially using an Unna boot.  As per staff at patient's NH and confirmed by 

patient's PCP, Dr. Andujar, patient has been on PO vancomycin with minimal 

healing.  Patient c/o pain, however denies any subjective fevers, chills and c/

o significant RLE pain and states she has been ambulating with significant 

discomfort.  





NKDA


PMD: Dr. Andujar


 











<Allie Shah - Last Filed: 01/15/18 18:38>





- General


Chief Complaint: Wound


Stated Complaint: ANKLE WOUND ON BOTH LEGS


Time Seen by Provider: 01/15/18 14:42





Past History





<Rasheed Jones - Last Filed: 01/15/18 15:58>





- Past Medical History


Anemia: No


Asthma: No


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


Cardiac Disorders: No


CVA: No


COPD: No


CHF: No


Dementia: No


Diabetes: No


GI Disorders: No


 Disorders: No


HTN: Yes


Hypercholesterolemia: No


Liver Disease: No


Seizures: No


Thyroid Disease: No





- Surgical History


Abdominal Surgery: Yes (appendectomy 10/2014)


Appendectomy: Yes


Cardiac Surgery: No


Cholecystectomy: Yes


Lung Surgery: No


Neurologic Surgery: No


Orthopedic Surgery: Yes (bilateral hip knee replacement)





- Immunization History


Immunization Up to Date: Yes





- Suicide/Smoking/Psychosocial Hx


Smoking History: Never smoked


Have you smoked in the past 12 months: No


Information on smoking cessation initiated: No


Hx Alcohol Use: No


Drug/Substance Use Hx: No


Substance Use Type: None


Hx Substance Use Treatment: No





<Allie Shah - Last Filed: 01/15/18 18:38>





- Past Medical History


Allergies/Adverse Reactions: 


 Allergies











Allergy/AdvReac Type Severity Reaction Status Date / Time


 


No Known Allergies Allergy   Verified 01/15/18 13:26











Home Medications: 


Ambulatory Orders





Gabapentin [Neurontin -] 400 mg PO Q8H 06/09/14 


Sennosides [Senna -] 1 mg PO HS 06/09/14 


Simethicone [Gas Relief] 80 mg PO QID 06/09/14 


Acetaminophen [Tylenol] 650 mg PO TID 01/15/18 


Amlodipine Besylate [Norvasc -] 2.5 mg PO DAILY 01/15/18 


Ascorbate Calcium [Vitamin C] 500 mg PO DAILY 01/15/18 


Bacitracin - [Bacitracin Topical Ointment -] 1 applic TP DAILY 01/15/18 


Calcium Polycarbophil [Fiber Laxative] 625 mg PO HS 01/15/18 


Celecoxib [Celebrex] 100 mg PO BID 01/15/18 


Cholecalciferol (Vitamin D3) [Vitamin D3] 1,000 unit PO DAILY 01/15/18 


Collagenase Clostridium Hist. [Santyl] 1 applic TP DAILY 01/15/18 


Cyanocobalamin (Vitamin B-12) [Vitamin B-12] 1,000 mcg PO DAILY 01/15/18 


Docusate Liquid [Colace Liquid -] 300 mg PO HS 01/15/18 


Heparin Sodium,Porcine/Pf [Heparin Sod 5,000 Unit/ 0.5 ml] 5,000 unit SQ BID 01/

15/18 


Hydromorphone [Dilaudid -] 4 mg PO Q4H 01/15/18 


Lactobacillus Rhamnosus GG [Culturelle] 2 each PO DAILY 01/15/18 


Multivitamins [Tab-A-Vit -] 1 tab PO DAILY 01/15/18 


Ondansetron [Zofran -] 4 mg PO TID 01/15/18 


Ranitidine HCl 150 mg PO DAILY 01/15/18 


Vit B Comp/C/Folic/Iron/Vit E [Vitamin B Complex Tablet] 1 each PO DAILY 01/15/

18 











**Review of Systems





- Review of Systems


Constitutional: No: Chills, Fever


HEENTM: No: Blurred Vision, Double Vision


Respiratory: No: Cough, Shortness of Breath


ABD/GI: No: Constipated, Nausea, Vomiting


: No: Burning, Dysuria


All Other Systems: Reviewed and Negative





<Allie Shah - Last Filed: 01/15/18 18:38>





*Physical Exam





- Vital Signs


 Last Vital Signs











Temp Pulse Resp BP Pulse Ox


 


 98.9 F   94 H  17   113/47   100 


 


 01/15/18 13:26  01/15/18 13:26  01/15/18 13:26  01/15/18 13:26  01/15/18 13:26














<Rasheed Jones - Last Filed: 01/15/18 15:58>





- Vital Signs


 Last Vital Signs











Temp Pulse Resp BP Pulse Ox


 


 98.9 F   94 H  17   113/47   100 


 


 01/15/18 13:26  01/15/18 13:26  01/15/18 13:26  01/15/18 13:26  01/15/18 13:26














- Physical Exam


General Appearance: Yes: Nourished, Thin


Respiratory/Chest: positive: Lungs Clear, Normal Breath Sounds


Cardiovascular: positive: S1, S2


Vascular Pulses: Dorsalis-Pedis (R): 2+, Doralis-Pedis (L): 2+


Gastrointestinal/Abdominal: positive: Normal Bowel Sounds, Soft


Extremity: positive: Normal Capillary Refill, Normal Inspection, Other (RLE: 

3x2 and 1x1 ulcers on the on the R dorsum of foot; LLE: 7x5cm non-healing flesh 

wound behind left calf; 2+ DP pulses intact b/l)


Neurologic: positive: Fully Oriented, Alert





<Allie Shah - Last Filed: 01/15/18 18:38>





ED Treatment Course





- Additional Consults


Time Called: 15:40 (First call. Awaiting call back.)


Consult/PCP: Dr. Inrda Andujar





<Rasheed Jones - Last Filed: 01/15/18 15:58>





- LABORATORY


CBC & Chemistry Diagram: 


 01/15/18 16:10





 01/15/18 16:50





<Allie Shah - Last Filed: 01/15/18 18:38>





Medical Decision Making





- Medical Decision Making





01/15/18 18:19


Patient is a 86 y.o. female who presents to our ED at the behest of Dr. Hoyos 

for admission 2/2 to poor healing RLE wound.  On PE patient is hemodynamically 

stable.  Will order CBC/CMP, CXR and EKG in preparation for admission.  Broad 

spectrum coverage Vanco/Pip Tazo.  Case d/w Dr. He Burton (ID) and patient's 

PCP, Dr. Andujar.  Patient to be admitted to inpatient medicine for IV 

antibiotics.  Pain control with Morphine (patient states she is not taking 

Dilaudid as listed in her NH med list, confirmed with NH).  Will continue 

monitor while in ED.











<Allie Shah - Last Filed: 01/15/18 18:38>





*DC/Admit/Observation/Transfer





- Attestations


Scribe Attestion: 





01/15/18 15:59





Documentation prepared by Rasheed Jones, acting as medical scribe for Phill Cabrera MD





<Rasheed Jones - Last Filed: 01/15/18 15:58>





- Discharge Dispostion


Admit: Yes





<Allie Shah - Last Filed: 01/15/18 18:38>


Diagnosis at time of Disposition: 


 Wound abscess








- Discharge Dispostion


Condition at time of disposition: Fair





- Referrals


Referrals: 


Indra Andujar MD [Primary Care Provider] - 





- Patient Instructions





- Post Discharge Activity

## 2018-01-15 NOTE — PDOC
Attending Attestation





- Resident


Resident Name: Allie Shah





- ED Attending Attestation


I have performed the following: I have examined & evaluated the patient, The 

case was reviewed & discussed with the resident, I agree w/resident's findings 

& plan, Exceptions are as noted





<Phill Cabrera - Last Filed: 01/15/18 16:20>





- HPI


HPI: 





01/15/18 16:20


The patient is a 86 year old female, with a significant past medical history of

  skin cancer, hypertension, b/l knee replacements, and polyarthritis,  who 

presents to the emergency department with right lower extremity wound for 

approximately 1 month. Patient reports associated right foot pain, but denies 

any discharge. She denies any recent fever or chills. She denies any chest pain

, shortness of breath, diaphoresis, palpitations, or lower extremity edema. She 

denies any recent travel or sick contacts.





- Physicial Exam


PE: 





01/15/18 16:21


Vitals: Triage vital signs reviewed


General Appearance: No acute distress, well nourished, well developed. Afebrile


Cardiac: Regular rate and rhythm, no murmurs, no rubs, no gallops


Lungs: Clear to auscultation bilateral, good air movement bilaterally


Extremities: Full range of motion to all extremities, no cyanosis.


Skin:  3x3 cm active ulcer to the the dorsum of the right foot. Right foot is 

erythematous and warm to touch. Remainder of the extremities normal.


Neuro:  AOX3; Cranial Nerves 2-12 grossly intact, Strength intact to upper 

extremities, Sensation intact to all extremities


Psych: Normal mood, normal affect








- Medical Decision Making





01/15/18 16:21


Pt  is a   86 year old female, with a significant past medical history of  skin 

cancer, hypertension, b/l knee replacements, and polyarthritis,  who presents 

to the emergency department with right lower extremity wound for approximately 

1 month.





Plan:


-Wound culture


-CBC,CMP


-ECG


-CXR, XR right foot








Documentation prepared by Rasheed Jones, acting as medical scribe for Phill Cabrera MD.





<Rasheed Jones - Last Filed: 01/15/18 16:38>





**Heart Score/ECG Review





- ECG Intrepretation


Comment:: 





01/15/18 16:38


Vent Rate: 94 bpm


IMPRESSION: Possible anterior infarct, but no ST elevations or T wave 

inversions.





<Rasheed Jones - Last Filed: 01/15/18 16:38>

## 2018-01-16 LAB
ALBUMIN SERPL-MCNC: 2.1 G/DL (ref 3.4–5)
ALP SERPL-CCNC: 216 U/L (ref 45–117)
ALT SERPL-CCNC: 12 U/L (ref 12–78)
ANION GAP SERPL CALC-SCNC: 15 MMOL/L (ref 8–16)
AST SERPL-CCNC: 37 U/L (ref 15–37)
BILIRUB SERPL-MCNC: 0.3 MG/DL (ref 0.2–1)
BUN SERPL-MCNC: 15 MG/DL (ref 7–18)
CALCIUM SERPL-MCNC: 8.3 MG/DL (ref 8.5–10.1)
CHLORIDE SERPL-SCNC: 100 MMOL/L (ref 98–107)
CO2 SERPL-SCNC: 20 MMOL/L (ref 21–32)
CREAT SERPL-MCNC: 0.9 MG/DL (ref 0.55–1.02)
DEPRECATED RDW RBC AUTO: 14.1 % (ref 11.6–15.6)
GLUCOSE SERPL-MCNC: 81 MG/DL (ref 74–106)
HCT VFR BLD CALC: 31.9 % (ref 32.4–45.2)
HGB BLD-MCNC: 10.3 GM/DL (ref 10.7–15.3)
MCH RBC QN AUTO: 31.6 PG (ref 25.7–33.7)
MCHC RBC AUTO-ENTMCNC: 32.5 G/DL (ref 32–36)
MCV RBC: 97.3 FL (ref 80–96)
PLATELET # BLD AUTO: 348 K/MM3 (ref 134–434)
PMV BLD: 7.9 FL (ref 7.5–11.1)
POTASSIUM SERPLBLD-SCNC: 4.2 MMOL/L (ref 3.5–5.1)
PROT SERPL-MCNC: 6.4 G/DL (ref 6.4–8.2)
RBC # BLD AUTO: 3.27 M/MM3 (ref 3.6–5.2)
SODIUM SERPL-SCNC: 135 MMOL/L (ref 136–145)
WBC # BLD AUTO: 7.5 K/MM3 (ref 4–10)

## 2018-01-16 RX ADMIN — SIMETHICONE CHEW TAB 80 MG SCH MG: 80 TABLET ORAL at 21:08

## 2018-01-16 RX ADMIN — MORPHINE SULFATE PRN MG: 10 INJECTION, SOLUTION INTRAMUSCULAR; INTRAVENOUS at 20:57

## 2018-01-16 RX ADMIN — ONDANSETRON HYDROCHLORIDE SCH MG: 4 TABLET, FILM COATED ORAL at 21:08

## 2018-01-16 RX ADMIN — ACETAMINOPHEN SCH MG: 325 TABLET ORAL at 06:19

## 2018-01-16 RX ADMIN — SIMETHICONE CHEW TAB 80 MG SCH MG: 80 TABLET ORAL at 13:54

## 2018-01-16 RX ADMIN — SIMETHICONE CHEW TAB 80 MG SCH MG: 80 TABLET ORAL at 10:38

## 2018-01-16 RX ADMIN — ONDANSETRON HYDROCHLORIDE SCH MG: 4 TABLET, FILM COATED ORAL at 06:19

## 2018-01-16 RX ADMIN — ACETAMINOPHEN SCH MG: 325 TABLET ORAL at 21:08

## 2018-01-16 RX ADMIN — DOCUSATE SODIUM LIQUID SCH: 100 LIQUID ORAL at 21:30

## 2018-01-16 RX ADMIN — CELECOXIB SCH MG: 100 CAPSULE ORAL at 23:10

## 2018-01-16 RX ADMIN — CELECOXIB SCH MG: 100 CAPSULE ORAL at 10:38

## 2018-01-16 RX ADMIN — BACITRACIN ZINC SCH APPLIC: 500 OINTMENT TOPICAL at 10:38

## 2018-01-16 RX ADMIN — SODIUM CHLORIDE SCH MLS/HR: 9 INJECTION, SOLUTION INTRAVENOUS at 21:29

## 2018-01-16 RX ADMIN — MULTIVITAMIN TABLET SCH TAB: TABLET at 10:37

## 2018-01-16 RX ADMIN — MORPHINE SULFATE PRN MG: 10 INJECTION, SOLUTION INTRAMUSCULAR; INTRAVENOUS at 15:47

## 2018-01-16 RX ADMIN — GABAPENTIN SCH MG: 400 CAPSULE ORAL at 10:38

## 2018-01-16 RX ADMIN — GABAPENTIN SCH MG: 400 CAPSULE ORAL at 21:08

## 2018-01-16 RX ADMIN — ACETAMINOPHEN SCH MG: 325 TABLET ORAL at 13:53

## 2018-01-16 RX ADMIN — AMLODIPINE BESYLATE SCH MG: 2.5 TABLET ORAL at 10:37

## 2018-01-16 RX ADMIN — SENNOSIDES SCH TAB: 8.6 TABLET, FILM COATED ORAL at 21:08

## 2018-01-16 RX ADMIN — HEPARIN SODIUM SCH UNIT: 5000 INJECTION, SOLUTION INTRAVENOUS; SUBCUTANEOUS at 10:38

## 2018-01-16 RX ADMIN — ONDANSETRON HYDROCHLORIDE SCH MG: 4 TABLET, FILM COATED ORAL at 13:53

## 2018-01-16 RX ADMIN — SIMETHICONE CHEW TAB 80 MG SCH MG: 80 TABLET ORAL at 18:00

## 2018-01-16 RX ADMIN — MORPHINE SULFATE PRN MG: 10 INJECTION, SOLUTION INTRAMUSCULAR; INTRAVENOUS at 10:55

## 2018-01-16 RX ADMIN — Medication SCH TAB: at 10:38

## 2018-01-16 RX ADMIN — HEPARIN SODIUM SCH UNIT: 5000 INJECTION, SOLUTION INTRAVENOUS; SUBCUTANEOUS at 21:09

## 2018-01-16 RX ADMIN — RANITIDINE SCH MG: 150 TABLET ORAL at 10:37

## 2018-01-16 NOTE — EKG
Test Reason : 

Blood Pressure : ***/*** mmHG

Vent. Rate : 094 BPM     Atrial Rate : 094 BPM

   P-R Int : 128 ms          QRS Dur : 076 ms

    QT Int : 346 ms       P-R-T Axes : 033 -22 058 degrees

   QTc Int : 432 ms

 

*** POOR DATA QUALITY, INTERPRETATION MAY BE ADVERSELY AFFECTED

NORMAL SINUS RHYTHM

POSSIBLE ANTERIOR INFARCT , AGE UNDETERMINED

ABNORMAL ECG

WHEN COMPARED WITH ECG OF 04-AUG-2017 21:36,

ST NOW DEPRESSED IN INFERIOR LEADS

Confirmed by MD Ernesto, Randy ([id number]) on 1/16/2018 10:51:01 AM

 

Referred By:             Confirmed By:Randy Orozco MD

## 2018-01-16 NOTE — CONSULT
Consult - text type





- Consultation


Consultation Note: 





Podiatry Consultation:





Pleasant 86 year old PVD F presents for admission to ED sent by Dr. Hoyos for 

non-healing, worsening pressure ulcer R anterior rearfoot.  Patient treated 

with course of PO abx for about 4 weeks with no improvement in symptoms.  

Denies F/V/N/C/SOB/CP.  AFebrile, VSS.





PMHx: HTN, PVD, CVA, arthritis


Meds: noted


ALL: NKMA





MONY:





Pedal pulses non-palpable, TG wnl, CFT brisk to all toes.  There is a dorsal 

lateral rearfoot pressure ulcer unstageable with necrotic eschar superficially, 

fibrotic underlying tissue, probes deep, overhanging margins, periwound 

erythema present, no streaking cellulitis, no purulent drainage, no soft tissue 

crepitus.  Significant tenderness to palpation.





WBC: 7.5


ESR: 97





Blood Cx: pending





R foot XR: no radiographic evidence of osteomyelitis





Imp: 86 year old PVD F with R foot pressure ulcer unstageable, r/o osteomyelitis


1. IV abx, Infectious Disease consultation


2. DSD R foot


3. For MRI to rule out osteomyelitis


4. Depending on results of MRI, +/- bone biopsy


5. Will follow. Thank you for the courtesy of this consultation





RAVEN Villa DPM

## 2018-01-16 NOTE — CONS
DATE OF CONSULTATION:

 

REQUESTING PHYSICIAN:  Dr. Andujar

 

HISTORY:  This is an 86-year-old woman.  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  She is admitted for a nonhealing right lower extremity wound. 

I spoke with both her doctors who report that she has had this ulcer for about 2

months now on her right leg.  She has a history that in August she was in the

hospital after she sustained a fall and had right knee pain.  She was found to have a

right distal femur fracture that was a nondisplaced periprosthetic fracture and was

treated conservatively.  At that time, an immobilizer was used with improvement.  She

subsequently developed this ulcer.  A wound culture in early December grew MRSA, and

she has been on Bactrim since that time.  She was seen yesterday in wound care, and

the wound had not improved.  Dr. Hoyos felt it probe to bone, and he advised MRI and

further evaluation for osteomyelitis.  She was admitted for this.  She was given 1

dose of vancomycin and Zosyn in the emergency room.  I am asked to see her for

further evaluation.  The patient is awake and alert.  She complains of severe leg

pain.  She has not been ambulating at all, and she has right foot drop.  It is

unclear when she last ambulated.  She says she does have a rollator.

 

PAST MEDICAL HISTORY:  Notable for CVA.  She has had ulcers on her leg in the past. 

She has had a history of skin cancer to her right shoulder.  She has hypertension.

 

PAST SURGICAL HISTORY:  Notable for appendectomy, cholecystectomy.  She has had

bilateral hip and knee replacements.  She has had a patella, and her patella has been

removed as well.

 

ALLERGIES:  She has no known drug allergies.

 

MEDICATIONS:  At the nursing home currently include Neurontin, Senna, simethicone,

amlodipine, vitamin C, bacitracin, Celebrex, vitamin D, Santyl, vitamin B12, Colace,

subcutaneous heparin, Dilaudid, Lactobacillus, multivitamin, Zofran, ranitidine, and

vitamin B.

 

REVIEW OF SYSTEMS:  She has no fevers or chills.  Her main complaints are of leg

pain.  She has severe leg pain when her foot is moved. 

 

PHYSICAL EXAMINATION: 

General:  She is a pleasant, elderly woman.  She has had no fever.

Vital Signs:  Current temperature is 98.3, pulse is 71, blood pressure 132/77,

respiratory rate 18.  She is saturating 96% on room air.  

HEENT:  She is normocephalic.  Her eyes are anicteric.

Neck:  Supple.

Lungs:  Clear to auscultation.

Heart:  Regular rate and rhythm.

Abdomen:  Soft and nontender.

Extremities:  Notable for she has healed incisions on both her knees.  She is missing

a left kneecap.  She has some chronic ulcerations on the mid tibia of both her legs. 

On the lateral aspect of her right ankle, she has 2 open, necrotic ulcers with

minimal edema.  There is no purulence or odor.

 

Her chest x-ray is unchanged.  There is no acute pathology.  X-ray of the foot shows

no evidence of osteomyelitis.  Labs are noted for a white count 7.5, hemoglobin 10.3,

platelets 248.  Her sedimentation rate is 97.  Her CRP is 22.  BUN 15, creatinine

0.9.  Her alkaline phosphatase is 216 with an albumin of 2.1.  Blood cultures and

wound cultures are pending.  

 

I suspect at this time that she has osteomyelitis of her right lateral foot or ankle.

 I would suggest we obtain an MRI of the foot and ankle.  Would obtain a bone biopsy

as she has failed Bactrim.  Consider debridement.  I spoke with Dr. Hoyos and Dr. Andujar.  Would hold further antibiotics in an effort to get a biopsy off

antibiotics.  Sedimentation rate and CRP are noted to be quite elevated and would

suggest Podiatry to evaluate as well.

 

 

 

ROSANA MARI M.D.

 

[id number]

DD: 01/16/2018 10:17

DT: 01/16/2018 10:37

Job #:  [id number]

## 2018-01-17 LAB
ANION GAP SERPL CALC-SCNC: 9 MMOL/L (ref 8–16)
BUN SERPL-MCNC: 12 MG/DL (ref 7–18)
CALCIUM SERPL-MCNC: 8.1 MG/DL (ref 8.5–10.1)
CHLORIDE SERPL-SCNC: 103 MMOL/L (ref 98–107)
CO2 SERPL-SCNC: 24 MMOL/L (ref 21–32)
CREAT SERPL-MCNC: 0.6 MG/DL (ref 0.55–1.02)
DEPRECATED RDW RBC AUTO: 13.6 % (ref 11.6–15.6)
GLUCOSE SERPL-MCNC: 85 MG/DL (ref 74–106)
HCT VFR BLD CALC: 27 % (ref 32.4–45.2)
HGB BLD-MCNC: 8.9 GM/DL (ref 10.7–15.3)
MCH RBC QN AUTO: 31.6 PG (ref 25.7–33.7)
MCHC RBC AUTO-ENTMCNC: 32.9 G/DL (ref 32–36)
MCV RBC: 96.2 FL (ref 80–96)
PLATELET # BLD AUTO: 297 K/MM3 (ref 134–434)
PMV BLD: 7.5 FL (ref 7.5–11.1)
POTASSIUM SERPLBLD-SCNC: 4.1 MMOL/L (ref 3.5–5.1)
RBC # BLD AUTO: 2.8 M/MM3 (ref 3.6–5.2)
SODIUM SERPL-SCNC: 136 MMOL/L (ref 136–145)
WBC # BLD AUTO: 5.5 K/MM3 (ref 4–10)

## 2018-01-17 RX ADMIN — ONDANSETRON HYDROCHLORIDE SCH MG: 4 TABLET, FILM COATED ORAL at 14:36

## 2018-01-17 RX ADMIN — SIMETHICONE CHEW TAB 80 MG SCH MG: 80 TABLET ORAL at 11:06

## 2018-01-17 RX ADMIN — ONDANSETRON HYDROCHLORIDE SCH MG: 4 TABLET, FILM COATED ORAL at 05:47

## 2018-01-17 RX ADMIN — MORPHINE SULFATE PRN MG: 10 INJECTION, SOLUTION INTRAMUSCULAR; INTRAVENOUS at 16:36

## 2018-01-17 RX ADMIN — CELECOXIB SCH MG: 100 CAPSULE ORAL at 11:07

## 2018-01-17 RX ADMIN — Medication SCH TAB: at 11:06

## 2018-01-17 RX ADMIN — SIMETHICONE CHEW TAB 80 MG SCH: 80 TABLET ORAL at 17:44

## 2018-01-17 RX ADMIN — AMLODIPINE BESYLATE SCH: 2.5 TABLET ORAL at 11:10

## 2018-01-17 RX ADMIN — SIMETHICONE CHEW TAB 80 MG SCH MG: 80 TABLET ORAL at 22:00

## 2018-01-17 RX ADMIN — BACITRACIN ZINC SCH: 500 OINTMENT TOPICAL at 15:53

## 2018-01-17 RX ADMIN — GABAPENTIN SCH MG: 400 CAPSULE ORAL at 11:07

## 2018-01-17 RX ADMIN — MULTIVITAMIN TABLET SCH TAB: TABLET at 11:07

## 2018-01-17 RX ADMIN — COLLAGENASE SANTYL SCH APPLIC: 250 OINTMENT TOPICAL at 16:37

## 2018-01-17 RX ADMIN — DOCUSATE SODIUM LIQUID SCH: 100 LIQUID ORAL at 11:08

## 2018-01-17 RX ADMIN — RANITIDINE SCH MG: 150 TABLET ORAL at 11:06

## 2018-01-17 RX ADMIN — HEPARIN SODIUM SCH UNIT: 5000 INJECTION, SOLUTION INTRAVENOUS; SUBCUTANEOUS at 11:08

## 2018-01-17 RX ADMIN — SENNOSIDES SCH: 8.6 TABLET, FILM COATED ORAL at 22:01

## 2018-01-17 RX ADMIN — CELECOXIB SCH MG: 100 CAPSULE ORAL at 21:58

## 2018-01-17 RX ADMIN — ACETAMINOPHEN SCH MG: 325 TABLET ORAL at 05:45

## 2018-01-17 RX ADMIN — MORPHINE SULFATE PRN MG: 10 INJECTION, SOLUTION INTRAMUSCULAR; INTRAVENOUS at 11:33

## 2018-01-17 RX ADMIN — SIMETHICONE CHEW TAB 80 MG SCH: 80 TABLET ORAL at 14:36

## 2018-01-17 RX ADMIN — ACETAMINOPHEN SCH MG: 325 TABLET ORAL at 14:34

## 2018-01-17 RX ADMIN — ACETAMINOPHEN SCH MG: 325 TABLET ORAL at 22:01

## 2018-01-17 RX ADMIN — GABAPENTIN SCH MG: 400 CAPSULE ORAL at 22:00

## 2018-01-17 RX ADMIN — MORPHINE SULFATE PRN MG: 10 INJECTION, SOLUTION INTRAMUSCULAR; INTRAVENOUS at 22:27

## 2018-01-17 RX ADMIN — HEPARIN SODIUM SCH UNIT: 5000 INJECTION, SOLUTION INTRAVENOUS; SUBCUTANEOUS at 21:59

## 2018-01-17 RX ADMIN — ONDANSETRON HYDROCHLORIDE SCH MG: 4 TABLET, FILM COATED ORAL at 22:02

## 2018-01-18 LAB
ANION GAP SERPL CALC-SCNC: 9 MMOL/L (ref 8–16)
BUN SERPL-MCNC: 11 MG/DL (ref 7–18)
CALCIUM SERPL-MCNC: 8.6 MG/DL (ref 8.5–10.1)
CHLORIDE SERPL-SCNC: 102 MMOL/L (ref 98–107)
CO2 SERPL-SCNC: 26 MMOL/L (ref 21–32)
CREAT SERPL-MCNC: 0.6 MG/DL (ref 0.55–1.02)
DEPRECATED RDW RBC AUTO: 13.8 % (ref 11.6–15.6)
GLUCOSE SERPL-MCNC: 73 MG/DL (ref 74–106)
HCT VFR BLD CALC: 31.4 % (ref 32.4–45.2)
HGB BLD-MCNC: 10.1 GM/DL (ref 10.7–15.3)
MCH RBC QN AUTO: 31 PG (ref 25.7–33.7)
MCHC RBC AUTO-ENTMCNC: 32.1 G/DL (ref 32–36)
MCV RBC: 96.8 FL (ref 80–96)
PLATELET # BLD AUTO: 302 K/MM3 (ref 134–434)
PMV BLD: 7.2 FL (ref 7.5–11.1)
POTASSIUM SERPLBLD-SCNC: 5 MMOL/L (ref 3.5–5.1)
RBC # BLD AUTO: 3.24 M/MM3 (ref 3.6–5.2)
RETICS # AUTO: 1.23 % (ref 0.5–1.5)
SODIUM SERPL-SCNC: 137 MMOL/L (ref 136–145)
WBC # BLD AUTO: 5.6 K/MM3 (ref 4–10)

## 2018-01-18 RX ADMIN — SIMETHICONE CHEW TAB 80 MG SCH MG: 80 TABLET ORAL at 17:26

## 2018-01-18 RX ADMIN — SIMETHICONE CHEW TAB 80 MG SCH MG: 80 TABLET ORAL at 21:57

## 2018-01-18 RX ADMIN — MORPHINE SULFATE PRN MG: 10 INJECTION, SOLUTION INTRAMUSCULAR; INTRAVENOUS at 06:16

## 2018-01-18 RX ADMIN — GABAPENTIN SCH MG: 400 CAPSULE ORAL at 10:58

## 2018-01-18 RX ADMIN — AMLODIPINE BESYLATE SCH: 2.5 TABLET ORAL at 11:08

## 2018-01-18 RX ADMIN — ACETAMINOPHEN SCH MG: 325 TABLET ORAL at 21:57

## 2018-01-18 RX ADMIN — MORPHINE SULFATE PRN MG: 10 INJECTION, SOLUTION INTRAMUSCULAR; INTRAVENOUS at 10:56

## 2018-01-18 RX ADMIN — ONDANSETRON HYDROCHLORIDE SCH MG: 4 TABLET, FILM COATED ORAL at 06:10

## 2018-01-18 RX ADMIN — MULTIVITAMIN TABLET SCH TAB: TABLET at 10:57

## 2018-01-18 RX ADMIN — Medication SCH TAB: at 10:58

## 2018-01-18 RX ADMIN — CELECOXIB SCH MG: 100 CAPSULE ORAL at 10:59

## 2018-01-18 RX ADMIN — RANITIDINE SCH MG: 150 TABLET ORAL at 10:57

## 2018-01-18 RX ADMIN — DOCUSATE SODIUM LIQUID SCH: 100 LIQUID ORAL at 11:10

## 2018-01-18 RX ADMIN — MORPHINE SULFATE PRN MG: 10 INJECTION, SOLUTION INTRAMUSCULAR; INTRAVENOUS at 15:05

## 2018-01-18 RX ADMIN — ONDANSETRON HYDROCHLORIDE SCH MG: 4 TABLET, FILM COATED ORAL at 21:57

## 2018-01-18 RX ADMIN — AMLODIPINE BESYLATE SCH MG: 2.5 TABLET ORAL at 10:58

## 2018-01-18 RX ADMIN — GABAPENTIN SCH MG: 400 CAPSULE ORAL at 21:56

## 2018-01-18 RX ADMIN — HEPARIN SODIUM SCH UNIT: 5000 INJECTION, SOLUTION INTRAVENOUS; SUBCUTANEOUS at 21:57

## 2018-01-18 RX ADMIN — MORPHINE SULFATE PRN MG: 10 INJECTION, SOLUTION INTRAMUSCULAR; INTRAVENOUS at 19:20

## 2018-01-18 RX ADMIN — MORPHINE SULFATE PRN MG: 10 INJECTION, SOLUTION INTRAMUSCULAR; INTRAVENOUS at 23:29

## 2018-01-18 RX ADMIN — ONDANSETRON HYDROCHLORIDE SCH MG: 4 TABLET, FILM COATED ORAL at 13:37

## 2018-01-18 RX ADMIN — ACETAMINOPHEN SCH MG: 325 TABLET ORAL at 13:39

## 2018-01-18 RX ADMIN — COLLAGENASE SANTYL SCH APPLIC: 250 OINTMENT TOPICAL at 13:44

## 2018-01-18 RX ADMIN — SIMETHICONE CHEW TAB 80 MG SCH MG: 80 TABLET ORAL at 10:58

## 2018-01-18 RX ADMIN — SENNOSIDES SCH: 8.6 TABLET, FILM COATED ORAL at 22:03

## 2018-01-18 RX ADMIN — CELECOXIB SCH MG: 100 CAPSULE ORAL at 21:56

## 2018-01-18 RX ADMIN — SIMETHICONE CHEW TAB 80 MG SCH MG: 80 TABLET ORAL at 13:37

## 2018-01-18 RX ADMIN — HEPARIN SODIUM SCH UNIT: 5000 INJECTION, SOLUTION INTRAVENOUS; SUBCUTANEOUS at 10:58

## 2018-01-18 RX ADMIN — ACETAMINOPHEN SCH MG: 325 TABLET ORAL at 06:09

## 2018-01-19 LAB
ANION GAP SERPL CALC-SCNC: 6 MMOL/L (ref 8–16)
BUN SERPL-MCNC: 10 MG/DL (ref 7–18)
CALCIUM SERPL-MCNC: 8.1 MG/DL (ref 8.5–10.1)
CHLORIDE SERPL-SCNC: 101 MMOL/L (ref 98–107)
CO2 SERPL-SCNC: 28 MMOL/L (ref 21–32)
CREAT SERPL-MCNC: 0.5 MG/DL (ref 0.55–1.02)
DEPRECATED RDW RBC AUTO: 14 % (ref 11.6–15.6)
GLUCOSE SERPL-MCNC: 85 MG/DL (ref 74–106)
HCT VFR BLD CALC: 30.1 % (ref 32.4–45.2)
HGB BLD-MCNC: 9.7 GM/DL (ref 10.7–15.3)
MCH RBC QN AUTO: 31 PG (ref 25.7–33.7)
MCHC RBC AUTO-ENTMCNC: 32.2 G/DL (ref 32–36)
MCV RBC: 96.3 FL (ref 80–96)
PLATELET # BLD AUTO: 303 K/MM3 (ref 134–434)
PMV BLD: 7.7 FL (ref 7.5–11.1)
POTASSIUM SERPLBLD-SCNC: 4.1 MMOL/L (ref 3.5–5.1)
RBC # BLD AUTO: 3.12 M/MM3 (ref 3.6–5.2)
SODIUM SERPL-SCNC: 135 MMOL/L (ref 136–145)
WBC # BLD AUTO: 5.8 K/MM3 (ref 4–10)

## 2018-01-19 RX ADMIN — SIMETHICONE CHEW TAB 80 MG SCH MG: 80 TABLET ORAL at 14:20

## 2018-01-19 RX ADMIN — ONDANSETRON HYDROCHLORIDE SCH MG: 4 TABLET, FILM COATED ORAL at 21:42

## 2018-01-19 RX ADMIN — SIMETHICONE CHEW TAB 80 MG SCH MG: 80 TABLET ORAL at 22:30

## 2018-01-19 RX ADMIN — MORPHINE SULFATE PRN MG: 10 INJECTION, SOLUTION INTRAMUSCULAR; INTRAVENOUS at 10:41

## 2018-01-19 RX ADMIN — SIMETHICONE CHEW TAB 80 MG SCH MG: 80 TABLET ORAL at 21:42

## 2018-01-19 RX ADMIN — RANITIDINE SCH MG: 150 TABLET ORAL at 10:43

## 2018-01-19 RX ADMIN — CELECOXIB SCH MG: 100 CAPSULE ORAL at 10:50

## 2018-01-19 RX ADMIN — HEPARIN SODIUM SCH UNIT: 5000 INJECTION, SOLUTION INTRAVENOUS; SUBCUTANEOUS at 21:42

## 2018-01-19 RX ADMIN — ONDANSETRON HYDROCHLORIDE SCH MG: 4 TABLET, FILM COATED ORAL at 14:20

## 2018-01-19 RX ADMIN — ACETAMINOPHEN SCH MG: 325 TABLET ORAL at 21:41

## 2018-01-19 RX ADMIN — COLLAGENASE SANTYL SCH APPLIC: 250 OINTMENT TOPICAL at 15:44

## 2018-01-19 RX ADMIN — DOCUSATE SODIUM LIQUID SCH: 100 LIQUID ORAL at 10:51

## 2018-01-19 RX ADMIN — HEPARIN SODIUM SCH UNIT: 5000 INJECTION, SOLUTION INTRAVENOUS; SUBCUTANEOUS at 10:43

## 2018-01-19 RX ADMIN — ACETAMINOPHEN SCH MG: 325 TABLET ORAL at 14:21

## 2018-01-19 RX ADMIN — SENNOSIDES SCH TAB: 8.6 TABLET, FILM COATED ORAL at 21:42

## 2018-01-19 RX ADMIN — MORPHINE SULFATE PRN MG: 10 INJECTION, SOLUTION INTRAMUSCULAR; INTRAVENOUS at 15:21

## 2018-01-19 RX ADMIN — ACETAMINOPHEN SCH MG: 325 TABLET ORAL at 06:38

## 2018-01-19 RX ADMIN — GABAPENTIN SCH MG: 400 CAPSULE ORAL at 21:42

## 2018-01-19 RX ADMIN — Medication SCH TAB: at 10:42

## 2018-01-19 RX ADMIN — SIMETHICONE CHEW TAB 80 MG SCH MG: 80 TABLET ORAL at 18:53

## 2018-01-19 RX ADMIN — MORPHINE SULFATE PRN MG: 10 INJECTION, SOLUTION INTRAMUSCULAR; INTRAVENOUS at 21:42

## 2018-01-19 RX ADMIN — GABAPENTIN SCH MG: 400 CAPSULE ORAL at 10:50

## 2018-01-19 RX ADMIN — MULTIVITAMIN TABLET SCH TAB: TABLET at 10:42

## 2018-01-19 RX ADMIN — CELECOXIB SCH MG: 100 CAPSULE ORAL at 21:42

## 2018-01-19 RX ADMIN — SIMETHICONE CHEW TAB 80 MG SCH MG: 80 TABLET ORAL at 10:43

## 2018-01-19 RX ADMIN — AMLODIPINE BESYLATE SCH MG: 2.5 TABLET ORAL at 10:43

## 2018-01-19 RX ADMIN — MORPHINE SULFATE PRN MG: 10 INJECTION, SOLUTION INTRAMUSCULAR; INTRAVENOUS at 06:38

## 2018-01-19 RX ADMIN — ONDANSETRON HYDROCHLORIDE SCH MG: 4 TABLET, FILM COATED ORAL at 06:38

## 2018-01-20 LAB
ALBUMIN SERPL-MCNC: 2 G/DL (ref 3.4–5)
ALP SERPL-CCNC: 181 U/L (ref 45–117)
ALT SERPL-CCNC: 18 U/L (ref 12–78)
ANION GAP SERPL CALC-SCNC: 10 MMOL/L (ref 8–16)
AST SERPL-CCNC: 25 U/L (ref 15–37)
BILIRUB SERPL-MCNC: 0.2 MG/DL (ref 0.2–1)
BUN SERPL-MCNC: 9 MG/DL (ref 7–18)
CALCIUM SERPL-MCNC: 8.4 MG/DL (ref 8.5–10.1)
CHLORIDE SERPL-SCNC: 101 MMOL/L (ref 98–107)
CO2 SERPL-SCNC: 27 MMOL/L (ref 21–32)
CREAT SERPL-MCNC: 0.5 MG/DL (ref 0.55–1.02)
DEPRECATED RDW RBC AUTO: 13.6 % (ref 11.6–15.6)
GLUCOSE SERPL-MCNC: 82 MG/DL (ref 74–106)
HCT VFR BLD CALC: 32.1 % (ref 32.4–45.2)
HGB BLD-MCNC: 10.4 GM/DL (ref 10.7–15.3)
MCH RBC QN AUTO: 31.1 PG (ref 25.7–33.7)
MCHC RBC AUTO-ENTMCNC: 32.6 G/DL (ref 32–36)
MCV RBC: 95.6 FL (ref 80–96)
PLATELET # BLD AUTO: 281 K/MM3 (ref 134–434)
PMV BLD: 7.7 FL (ref 7.5–11.1)
POTASSIUM SERPLBLD-SCNC: 4.2 MMOL/L (ref 3.5–5.1)
PROT SERPL-MCNC: 6.1 G/DL (ref 6.4–8.2)
RBC # BLD AUTO: 3.35 M/MM3 (ref 3.6–5.2)
SERUM IRON SATURATION: 38 % (ref 15–55)
SODIUM SERPL-SCNC: 138 MMOL/L (ref 136–145)
TIBC SERPL-MCNC: 139 UG/DL (ref 250–450)
UIBC SERPL-MCNC: 86 UG/DL (ref 118–369)
WBC # BLD AUTO: 6.4 K/MM3 (ref 4–10)

## 2018-01-20 RX ADMIN — ACETAMINOPHEN SCH MG: 325 TABLET ORAL at 14:48

## 2018-01-20 RX ADMIN — GABAPENTIN SCH MG: 400 CAPSULE ORAL at 11:08

## 2018-01-20 RX ADMIN — SIMETHICONE CHEW TAB 80 MG SCH MG: 80 TABLET ORAL at 22:11

## 2018-01-20 RX ADMIN — SENNOSIDES SCH TAB: 8.6 TABLET, FILM COATED ORAL at 22:11

## 2018-01-20 RX ADMIN — SIMETHICONE CHEW TAB 80 MG SCH MG: 80 TABLET ORAL at 19:02

## 2018-01-20 RX ADMIN — ACETAMINOPHEN SCH MG: 325 TABLET ORAL at 22:11

## 2018-01-20 RX ADMIN — COLLAGENASE SANTYL SCH APPLIC: 250 OINTMENT TOPICAL at 14:50

## 2018-01-20 RX ADMIN — ONDANSETRON HYDROCHLORIDE SCH MG: 4 TABLET, FILM COATED ORAL at 14:49

## 2018-01-20 RX ADMIN — MULTIVITAMIN TABLET SCH TAB: TABLET at 11:09

## 2018-01-20 RX ADMIN — Medication SCH TAB: at 11:07

## 2018-01-20 RX ADMIN — ACETAMINOPHEN SCH MG: 325 TABLET ORAL at 06:39

## 2018-01-20 RX ADMIN — MORPHINE SULFATE PRN MG: 10 INJECTION, SOLUTION INTRAMUSCULAR; INTRAVENOUS at 20:07

## 2018-01-20 RX ADMIN — HEPARIN SODIUM SCH UNIT: 5000 INJECTION, SOLUTION INTRAVENOUS; SUBCUTANEOUS at 11:10

## 2018-01-20 RX ADMIN — SIMETHICONE CHEW TAB 80 MG SCH MG: 80 TABLET ORAL at 11:09

## 2018-01-20 RX ADMIN — GABAPENTIN SCH MG: 400 CAPSULE ORAL at 22:11

## 2018-01-20 RX ADMIN — DOCUSATE SODIUM LIQUID SCH: 100 LIQUID ORAL at 11:13

## 2018-01-20 RX ADMIN — CELECOXIB SCH MG: 100 CAPSULE ORAL at 22:11

## 2018-01-20 RX ADMIN — SIMETHICONE CHEW TAB 80 MG SCH MG: 80 TABLET ORAL at 14:49

## 2018-01-20 RX ADMIN — ONDANSETRON HYDROCHLORIDE SCH MG: 4 TABLET, FILM COATED ORAL at 22:11

## 2018-01-20 RX ADMIN — AMLODIPINE BESYLATE SCH MG: 2.5 TABLET ORAL at 11:08

## 2018-01-20 RX ADMIN — RANITIDINE SCH MG: 150 TABLET ORAL at 11:10

## 2018-01-20 RX ADMIN — MORPHINE SULFATE PRN MG: 10 INJECTION, SOLUTION INTRAMUSCULAR; INTRAVENOUS at 11:11

## 2018-01-20 RX ADMIN — HEPARIN SODIUM SCH UNIT: 5000 INJECTION, SOLUTION INTRAVENOUS; SUBCUTANEOUS at 22:15

## 2018-01-20 RX ADMIN — CELECOXIB SCH MG: 100 CAPSULE ORAL at 11:08

## 2018-01-20 RX ADMIN — ONDANSETRON HYDROCHLORIDE SCH MG: 4 TABLET, FILM COATED ORAL at 06:39

## 2018-01-20 RX ADMIN — MORPHINE SULFATE PRN MG: 10 INJECTION, SOLUTION INTRAMUSCULAR; INTRAVENOUS at 15:44

## 2018-01-21 RX ADMIN — ONDANSETRON HYDROCHLORIDE SCH MG: 4 TABLET, FILM COATED ORAL at 22:02

## 2018-01-21 RX ADMIN — SIMETHICONE CHEW TAB 80 MG SCH MG: 80 TABLET ORAL at 10:45

## 2018-01-21 RX ADMIN — HEPARIN SODIUM SCH UNIT: 5000 INJECTION, SOLUTION INTRAVENOUS; SUBCUTANEOUS at 10:44

## 2018-01-21 RX ADMIN — ACETAMINOPHEN SCH MG: 325 TABLET ORAL at 22:01

## 2018-01-21 RX ADMIN — SIMETHICONE CHEW TAB 80 MG SCH MG: 80 TABLET ORAL at 14:40

## 2018-01-21 RX ADMIN — ACETAMINOPHEN SCH MG: 325 TABLET ORAL at 14:38

## 2018-01-21 RX ADMIN — GABAPENTIN SCH MG: 400 CAPSULE ORAL at 10:45

## 2018-01-21 RX ADMIN — SIMETHICONE CHEW TAB 80 MG SCH: 80 TABLET ORAL at 18:37

## 2018-01-21 RX ADMIN — ONDANSETRON HYDROCHLORIDE SCH MG: 4 TABLET, FILM COATED ORAL at 06:26

## 2018-01-21 RX ADMIN — RANITIDINE SCH MG: 150 TABLET ORAL at 10:44

## 2018-01-21 RX ADMIN — SENNOSIDES SCH: 8.6 TABLET, FILM COATED ORAL at 22:01

## 2018-01-21 RX ADMIN — MORPHINE SULFATE PRN MG: 10 INJECTION, SOLUTION INTRAMUSCULAR; INTRAVENOUS at 00:35

## 2018-01-21 RX ADMIN — HEPARIN SODIUM SCH UNIT: 5000 INJECTION, SOLUTION INTRAVENOUS; SUBCUTANEOUS at 22:00

## 2018-01-21 RX ADMIN — GABAPENTIN SCH MG: 400 CAPSULE ORAL at 22:01

## 2018-01-21 RX ADMIN — CELECOXIB SCH MG: 100 CAPSULE ORAL at 22:01

## 2018-01-21 RX ADMIN — AMLODIPINE BESYLATE SCH MG: 2.5 TABLET ORAL at 10:45

## 2018-01-21 RX ADMIN — MORPHINE SULFATE PRN MG: 10 INJECTION, SOLUTION INTRAMUSCULAR; INTRAVENOUS at 12:34

## 2018-01-21 RX ADMIN — DOCUSATE SODIUM LIQUID SCH MG: 100 LIQUID ORAL at 10:44

## 2018-01-21 RX ADMIN — ONDANSETRON HYDROCHLORIDE SCH MG: 4 TABLET, FILM COATED ORAL at 14:40

## 2018-01-21 RX ADMIN — ACETAMINOPHEN SCH MG: 325 TABLET ORAL at 06:26

## 2018-01-21 RX ADMIN — MORPHINE SULFATE PRN MG: 10 INJECTION, SOLUTION INTRAMUSCULAR; INTRAVENOUS at 08:22

## 2018-01-21 RX ADMIN — Medication SCH TAB: at 10:45

## 2018-01-21 RX ADMIN — CELECOXIB SCH MG: 100 CAPSULE ORAL at 10:46

## 2018-01-21 RX ADMIN — MULTIVITAMIN TABLET SCH TAB: TABLET at 10:45

## 2018-01-21 RX ADMIN — COLLAGENASE SANTYL SCH APPLIC: 250 OINTMENT TOPICAL at 10:48

## 2018-01-21 RX ADMIN — SIMETHICONE CHEW TAB 80 MG SCH MG: 80 TABLET ORAL at 22:01

## 2018-01-22 LAB
ALBUMIN SERPL-MCNC: 2 G/DL (ref 3.4–5)
ALP SERPL-CCNC: 171 U/L (ref 45–117)
ALT SERPL-CCNC: 17 U/L (ref 12–78)
ANION GAP SERPL CALC-SCNC: 7 MMOL/L (ref 8–16)
AST SERPL-CCNC: 20 U/L (ref 15–37)
BILIRUB SERPL-MCNC: 0.4 MG/DL (ref 0.2–1)
BUN SERPL-MCNC: 9 MG/DL (ref 7–18)
CALCIUM SERPL-MCNC: 8.1 MG/DL (ref 8.5–10.1)
CHLORIDE SERPL-SCNC: 99 MMOL/L (ref 98–107)
CO2 SERPL-SCNC: 29 MMOL/L (ref 21–32)
CREAT SERPL-MCNC: 0.5 MG/DL (ref 0.55–1.02)
DEPRECATED RDW RBC AUTO: 13.6 % (ref 11.6–15.6)
GLUCOSE SERPL-MCNC: 101 MG/DL (ref 74–106)
HCT VFR BLD CALC: 31.3 % (ref 32.4–45.2)
HGB BLD-MCNC: 10.2 GM/DL (ref 10.7–15.3)
INR BLD: 1.16 (ref 0.82–1.09)
MCH RBC QN AUTO: 31.1 PG (ref 25.7–33.7)
MCHC RBC AUTO-ENTMCNC: 32.5 G/DL (ref 32–36)
MCV RBC: 95.8 FL (ref 80–96)
PLATELET # BLD AUTO: 275 K/MM3 (ref 134–434)
PMV BLD: 7.7 FL (ref 7.5–11.1)
POTASSIUM SERPLBLD-SCNC: 4.3 MMOL/L (ref 3.5–5.1)
PROT SERPL-MCNC: 6 G/DL (ref 6.4–8.2)
PT PNL PPP: 13.1 SEC (ref 9.98–11.88)
RBC # BLD AUTO: 3.27 M/MM3 (ref 3.6–5.2)
SODIUM SERPL-SCNC: 135 MMOL/L (ref 136–145)
WBC # BLD AUTO: 8.7 K/MM3 (ref 4–10)

## 2018-01-22 PROCEDURE — 0YBK0ZX EXCISION OF RIGHT ANKLE REGION, OPEN APPROACH, DIAGNOSTIC: ICD-10-PCS | Performed by: PODIATRIST

## 2018-01-22 PROCEDURE — 0LBV0ZZ EXCISION OF RIGHT FOOT TENDON, OPEN APPROACH: ICD-10-PCS | Performed by: PODIATRIST

## 2018-01-22 RX ADMIN — CELECOXIB SCH MG: 100 CAPSULE ORAL at 22:12

## 2018-01-22 RX ADMIN — ACETAMINOPHEN SCH: 325 TABLET ORAL at 14:00

## 2018-01-22 RX ADMIN — Medication SCH: at 10:00

## 2018-01-22 RX ADMIN — VANCOMYCIN HYDROCHLORIDE SCH MLS/HR: 1 INJECTION, POWDER, LYOPHILIZED, FOR SOLUTION INTRAVENOUS at 18:33

## 2018-01-22 RX ADMIN — MULTIVITAMIN TABLET SCH TAB: TABLET at 18:19

## 2018-01-22 RX ADMIN — CELECOXIB SCH: 100 CAPSULE ORAL at 10:00

## 2018-01-22 RX ADMIN — SIMETHICONE CHEW TAB 80 MG SCH MG: 80 TABLET ORAL at 18:18

## 2018-01-22 RX ADMIN — GABAPENTIN SCH MG: 400 CAPSULE ORAL at 22:11

## 2018-01-22 RX ADMIN — HEPARIN SODIUM SCH UNIT: 5000 INJECTION, SOLUTION INTRAVENOUS; SUBCUTANEOUS at 22:10

## 2018-01-22 RX ADMIN — DEXTROSE AND SODIUM CHLORIDE SCH MLS: 5; 900 INJECTION, SOLUTION INTRAVENOUS at 16:45

## 2018-01-22 RX ADMIN — COLLAGENASE SANTYL SCH: 250 OINTMENT TOPICAL at 10:00

## 2018-01-22 RX ADMIN — RANITIDINE SCH: 150 TABLET ORAL at 10:00

## 2018-01-22 RX ADMIN — AMLODIPINE BESYLATE SCH MG: 2.5 TABLET ORAL at 10:05

## 2018-01-22 RX ADMIN — SIMETHICONE CHEW TAB 80 MG SCH: 80 TABLET ORAL at 14:00

## 2018-01-22 RX ADMIN — ONDANSETRON HYDROCHLORIDE SCH: 4 TABLET, FILM COATED ORAL at 05:43

## 2018-01-22 RX ADMIN — HEPARIN SODIUM SCH: 5000 INJECTION, SOLUTION INTRAVENOUS; SUBCUTANEOUS at 10:02

## 2018-01-22 RX ADMIN — ACETAMINOPHEN SCH: 325 TABLET ORAL at 05:43

## 2018-01-22 RX ADMIN — SIMETHICONE CHEW TAB 80 MG SCH: 80 TABLET ORAL at 10:00

## 2018-01-22 RX ADMIN — RANITIDINE SCH MG: 150 TABLET ORAL at 18:19

## 2018-01-22 RX ADMIN — SENNOSIDES SCH TAB: 8.6 TABLET, FILM COATED ORAL at 22:10

## 2018-01-22 RX ADMIN — SIMETHICONE CHEW TAB 80 MG SCH MG: 80 TABLET ORAL at 22:10

## 2018-01-22 RX ADMIN — DOCUSATE SODIUM LIQUID SCH: 100 LIQUID ORAL at 10:00

## 2018-01-22 RX ADMIN — ACETAMINOPHEN SCH MG: 325 TABLET ORAL at 22:10

## 2018-01-22 RX ADMIN — IPRATROPIUM BROMIDE AND ALBUTEROL SULFATE SCH AMP: .5; 3 SOLUTION RESPIRATORY (INHALATION) at 21:51

## 2018-01-22 RX ADMIN — MULTIVITAMIN TABLET SCH: TABLET at 10:00

## 2018-01-22 RX ADMIN — Medication SCH TAB: at 18:19

## 2018-01-22 RX ADMIN — ONDANSETRON HYDROCHLORIDE SCH: 4 TABLET, FILM COATED ORAL at 14:00

## 2018-01-22 RX ADMIN — ONDANSETRON HYDROCHLORIDE SCH MG: 4 TABLET, FILM COATED ORAL at 22:12

## 2018-01-22 RX ADMIN — GABAPENTIN SCH: 400 CAPSULE ORAL at 10:00

## 2018-01-22 NOTE — OP
Operative Note





- Note:


Operative Date: 01/22/18


Pre-Operative Diagnosis: R foot pressure ulcer unstageable, osteomyelitis


Operation: R foot debridement with bone biopsy


Post-Operative Diagnosis: Same as Pre-op


Surgeon: Stephen Villa


Anesthesia: Local, MAC


Specimens Removed: Bone and soft tissue right foot


Estimated Blood Loss (mls): 20


Instrument used (Debridements only): #15 blade scalpel and scissors


Operative Report Dictated: Yes

## 2018-01-22 NOTE — OP
DATE OF OPERATION:  01/22/2018

 

PREOPERATIVE DIAGNOSIS:  Right foot pressure ulcer, unstageable, and osteomyelitis of

the right foot.  

 

POSTOPERATIVE DIAGNOSIS:   Right foot pressure ulcer, unstageable, and osteomyelitis

of the right foot.  

 

PROCEDURE:  Right foot debridement with bone biopsy.

 

SURGEON:  Stephen Villa DPM

 

ANESTHESIA:  IV sedation with local.

 

HEMOSTASIS:  Surgical dissection.

 

ESTIMATED BLOOD LOSS:   20 mL. 

 

PATHOLOGY:  Bone and soft tissue, right foot. 

 

DESCRIPTION OF PROCEDURE:  The patient was brought to the operating room and placed

on the operating table in the supine position.  I elected to not use a tourniquet

during the course of the procedure.  Following the induction of IV sedation, local

anesthesia was achieved utilizing 20 mL of 2% lidocaine plain.  The right foot was

scrubbed, prepped, and draped in the usual sterile fashion.  Attention was directed

to the right dorsal midfoot and rearfoot where 2 unstageable pressure ulcers with

exposed bone were visualized.  I began by performing an excisional debridement of

subcutaneous tissue as well as exposed extensor tendon on both ulcer sites. The

debridement was continued using a sterile 15 and scissors until granular tissue

persisted.  Next, I directed my attention to the exposed bone, which happened to be

the lateral talar dome.  I used the sagittal saw to decorticate the lateral talar

dome. This was removed from the operative site and sectioned for bone culture and

pathology.  An appropriate soft tissue culture was obtained.  The rotatory bur was

used on power to smooth all rough edges of bone until healthy trabecular bone was

persistent. The surgical site was copiously irrigated with sterile saline.  The wound

was packed open with Xeroform and a sterile compressive dressing was applied to the

right foot, consisting of sterile gauze, Kerlix, and an Ace wrap.  The patient

tolerated the procedure and anesthesia well without complications.  She was

transferred from the operating room to the recovery unit with vital  signs stable and

neurovascularly intact to the right foot. 

 

 

CLAUDIA PRICE/[id number]

DD: 01/22/2018 15:35

DT: 01/22/2018 19:30

Job #:  [id number]

cc:  [facility]

## 2018-01-22 NOTE — CON.PULM
Consult


Consult Specialty:: PULMONARY


Referred by:: Dr. Andujar


Reason for Consultation:: cough





- History of Present Illness


Chief Complaint: foot ulcer


History of Present Illness: 





87yo female with h/o PAD, LE ulcers who was sent by wound clinic for probable 

right foot/ankle ulcer requiring antibiotics. During the hospitalization, she 

has developed a cough productive of clear sputum. She denies any shortness of 

breath, chest pain or palpitations. No fevers, chills or sweats. No fevers 

recorded during this admission. CXR has been unremarkable. Echocardiogram also 

unremarkable. She is a never smoker, denies history of asthma or COPD. No 2nd 

hand smoke exposure. Worked as a nurse in the past. 








- History Source


History Provided By: Patient, Medical Record


Limitations to Obtaining History: No Limitations





- Past Medical History


CNS: Yes: CVA


Musculoskeletal: Yes: Osteoarthritis (multiple sites, sever, on Dilaudid PO)


Dermatology: Yes: Other





- Alcohol/Substance Use


Hx Alcohol Use: No





- Smoking History


Smoking history: Never smoked


Have you smoked in the past 12 months: No





Home Medications





- Allergies


Allergies/Adverse Reactions: 


 Allergies











Allergy/AdvReac Type Severity Reaction Status Date / Time


 


No Known Allergies Allergy   Verified 01/15/18 13:26














- Home Medications


Home Medications: 


Ambulatory Orders





Gabapentin [Neurontin -] 400 mg PO BID 06/09/14 


Sennosides [Senna -] 1 mg PO HS 06/09/14 


Simethicone [Gas Relief] 2 tab PO QID 06/09/14 


Acetaminophen [Tylenol] 650 mg PO TID 01/15/18 


Amlodipine Besylate [Norvasc -] 2.5 mg PO DAILY 01/15/18 


Ascorbate Calcium [Vitamin C] 500 mg PO DAILY 01/15/18 


Bacitracin - [Bacitracin Topical Ointment -] 1 applic TP DAILY 01/15/18 


Calcium Polycarbophil [Fiber Laxative] 625 mg PO HS 01/15/18 


Celecoxib [Celebrex] 100 mg PO BID 01/15/18 


Cholecalciferol (Vitamin D3) [Vitamin D3] 1,000 unit PO DAILY 01/15/18 


Collagenase Clostridium Hist. [Santyl] 1 applic TP DAILY 01/15/18 


Cyanocobalamin (Vitamin B-12) [Vitamin B-12] 1,000 mcg PO DAILY 01/15/18 


Docusate Liquid [Colace Liquid -] 300 mg PO HS 01/15/18 


Heparin Sodium,Porcine/Pf [Heparin Sod 5,000 Unit/ 0.5 ml] 5,000 unit SQ BID 01/

15/18 


Hydromorphone [Dilaudid -] 4 mg PO Q4H 01/15/18 


Lactobacillus Rhamnosus GG [Culturelle] 2 each PO DAILY 01/15/18 


Multivitamins [Tab-A-Vit -] 1 tab PO DAILY 01/15/18 


Ondansetron [Zofran -] 4 mg PO TID 01/15/18 


Ranitidine HCl 150 mg PO DAILY 01/15/18 


Vit B Comp/C/Folic/Iron/Vit E [Vitamin B Complex Tablet] 1 each PO DAILY 01/15/

18 











Review of Systems





- Review of Systems


Constitutional: denies: Chills, Fever


Eyes: denies: Recent Change in Vision


HENT: denies: Nasal Congestion, Throat Pain


Neck: denies: Stiffness, Tenderness


Cardiovascular: denies: Chest Pain, Shortness of Breath


Respiratory: reports: Cough.  denies: Hemoptysis, SOB on Exertion, Wheezing


Gastrointestinal: denies: Abdominal Pain, Nausea, Vomiting


Genitourinary: denies: Dysuria, Hematuria


Neurological: denies: Dizziness, Headache





Physical Exam


Vital Sings: 


 Vital Signs











Temperature  98.9 F   01/22/18 08:00


 


Pulse Rate  72   01/22/18 08:00


 


Respiratory Rate  18   01/22/18 08:00


 


Blood Pressure  157/70   01/22/18 08:00


 


O2 Sat by Pulse Oximetry (%)  95   01/22/18 09:00











Constitutional: Yes: Calm


Eyes: Yes: Conjunctiva Clear, EOM Intact


HENT: Yes: Atraumatic, Normocephalic


Neck: Yes: Supple, Trachea Midline


Cardiovascular: Yes: Regular Rate and Rhythm


Respiratory: Yes: Rhonchi (bilateral ), Wheezes (scattered)


...Clubbing: No


Gastrointestinal: Yes: Normal Bowel Sounds, Soft.  No: Tenderness


Edema: No


Neurological: Yes: Alert, Oriented


Labs: 


 CBC, BMP





 01/22/18 07:15 





 01/22/18 07:15 











Imaging





- Results


Chest X-ray: Report Reviewed, Image Reviewed (no infiltrates)





Assessment/Plan





Osteomyelitis


r/o Acute Bronchospasm





-  will start standing nebulizers today


-  if no improvement, then will consider trial of steroids


-  O2 to keep SpO2 >90%


-  incentive spirometry


-  antibiotics per ID


-  outpt PFTs


-  DVT prophylaxis





Thank you for this consult


Shade Storey MD

## 2018-01-23 LAB
ANION GAP SERPL CALC-SCNC: 8 MMOL/L (ref 8–16)
BUN SERPL-MCNC: 7 MG/DL (ref 7–18)
CALCIUM SERPL-MCNC: 8.2 MG/DL (ref 8.5–10.1)
CHLORIDE SERPL-SCNC: 104 MMOL/L (ref 98–107)
CO2 SERPL-SCNC: 28 MMOL/L (ref 21–32)
CREAT SERPL-MCNC: 0.4 MG/DL (ref 0.55–1.02)
DEPRECATED RDW RBC AUTO: 13.7 % (ref 11.6–15.6)
GLUCOSE SERPL-MCNC: 108 MG/DL (ref 74–106)
HCT VFR BLD CALC: 28.3 % (ref 32.4–45.2)
HGB BLD-MCNC: 9.3 GM/DL (ref 10.7–15.3)
MCH RBC QN AUTO: 31.3 PG (ref 25.7–33.7)
MCHC RBC AUTO-ENTMCNC: 32.9 G/DL (ref 32–36)
MCV RBC: 95.1 FL (ref 80–96)
PLATELET # BLD AUTO: 249 K/MM3 (ref 134–434)
PMV BLD: 7.7 FL (ref 7.5–11.1)
POTASSIUM SERPLBLD-SCNC: 3.7 MMOL/L (ref 3.5–5.1)
RBC # BLD AUTO: 2.97 M/MM3 (ref 3.6–5.2)
SODIUM SERPL-SCNC: 140 MMOL/L (ref 136–145)
WBC # BLD AUTO: 7.7 K/MM3 (ref 4–10)

## 2018-01-23 RX ADMIN — GABAPENTIN SCH MG: 400 CAPSULE ORAL at 11:17

## 2018-01-23 RX ADMIN — Medication SCH TAB: at 11:17

## 2018-01-23 RX ADMIN — IPRATROPIUM BROMIDE AND ALBUTEROL SULFATE SCH AMP: .5; 3 SOLUTION RESPIRATORY (INHALATION) at 11:43

## 2018-01-23 RX ADMIN — IPRATROPIUM BROMIDE AND ALBUTEROL SULFATE SCH AMP: .5; 3 SOLUTION RESPIRATORY (INHALATION) at 16:00

## 2018-01-23 RX ADMIN — SENNOSIDES SCH TAB: 8.6 TABLET, FILM COATED ORAL at 21:38

## 2018-01-23 RX ADMIN — ONDANSETRON HYDROCHLORIDE SCH MG: 4 TABLET, FILM COATED ORAL at 13:42

## 2018-01-23 RX ADMIN — DOCUSATE SODIUM SCH MG: 100 CAPSULE, LIQUID FILLED ORAL at 13:41

## 2018-01-23 RX ADMIN — ACETAMINOPHEN SCH MG: 325 TABLET ORAL at 13:42

## 2018-01-23 RX ADMIN — SIMETHICONE CHEW TAB 80 MG SCH MG: 80 TABLET ORAL at 13:47

## 2018-01-23 RX ADMIN — CELECOXIB SCH MG: 100 CAPSULE ORAL at 11:17

## 2018-01-23 RX ADMIN — DEXTROSE AND SODIUM CHLORIDE SCH: 5; 900 INJECTION, SOLUTION INTRAVENOUS at 17:24

## 2018-01-23 RX ADMIN — AMLODIPINE BESYLATE SCH MG: 2.5 TABLET ORAL at 11:18

## 2018-01-23 RX ADMIN — GABAPENTIN SCH MG: 400 CAPSULE ORAL at 21:33

## 2018-01-23 RX ADMIN — ACETAMINOPHEN SCH MG: 325 TABLET ORAL at 21:33

## 2018-01-23 RX ADMIN — DEXTROSE AND SODIUM CHLORIDE SCH MLS/HR: 5; 900 INJECTION, SOLUTION INTRAVENOUS at 13:02

## 2018-01-23 RX ADMIN — RANITIDINE SCH MG: 150 TABLET ORAL at 11:17

## 2018-01-23 RX ADMIN — HEPARIN SODIUM SCH UNIT: 5000 INJECTION, SOLUTION INTRAVENOUS; SUBCUTANEOUS at 21:34

## 2018-01-23 RX ADMIN — MULTIVITAMIN TABLET SCH TAB: TABLET at 11:20

## 2018-01-23 RX ADMIN — METHYLPREDNISOLONE SODIUM SUCCINATE SCH MG: 40 INJECTION, POWDER, FOR SOLUTION INTRAMUSCULAR; INTRAVENOUS at 17:23

## 2018-01-23 RX ADMIN — ONDANSETRON HYDROCHLORIDE SCH MG: 4 TABLET, FILM COATED ORAL at 05:59

## 2018-01-23 RX ADMIN — SIMETHICONE CHEW TAB 80 MG SCH MG: 80 TABLET ORAL at 11:18

## 2018-01-23 RX ADMIN — HEPARIN SODIUM SCH UNIT: 5000 INJECTION, SOLUTION INTRAVENOUS; SUBCUTANEOUS at 11:18

## 2018-01-23 RX ADMIN — VANCOMYCIN HYDROCHLORIDE SCH MLS/HR: 1 INJECTION, POWDER, LYOPHILIZED, FOR SOLUTION INTRAVENOUS at 17:24

## 2018-01-23 RX ADMIN — SIMETHICONE CHEW TAB 80 MG SCH MG: 80 TABLET ORAL at 17:23

## 2018-01-23 RX ADMIN — METHYLPREDNISOLONE SODIUM SUCCINATE SCH MG: 40 INJECTION, POWDER, FOR SOLUTION INTRAMUSCULAR; INTRAVENOUS at 12:46

## 2018-01-23 RX ADMIN — IPRATROPIUM BROMIDE AND ALBUTEROL SULFATE SCH AMP: .5; 3 SOLUTION RESPIRATORY (INHALATION) at 20:20

## 2018-01-23 RX ADMIN — SIMETHICONE CHEW TAB 80 MG SCH MG: 80 TABLET ORAL at 21:38

## 2018-01-23 RX ADMIN — IPRATROPIUM BROMIDE AND ALBUTEROL SULFATE SCH AMP: .5; 3 SOLUTION RESPIRATORY (INHALATION) at 08:40

## 2018-01-23 RX ADMIN — COLLAGENASE SANTYL SCH APPLIC: 250 OINTMENT TOPICAL at 11:30

## 2018-01-23 RX ADMIN — ACETAMINOPHEN SCH MG: 325 TABLET ORAL at 05:59

## 2018-01-23 RX ADMIN — CELECOXIB SCH MG: 100 CAPSULE ORAL at 21:38

## 2018-01-23 RX ADMIN — ONDANSETRON HYDROCHLORIDE SCH MG: 4 TABLET, FILM COATED ORAL at 21:34

## 2018-01-24 LAB
ALBUMIN SERPL-MCNC: 2 G/DL (ref 3.4–5)
ALP SERPL-CCNC: 154 U/L (ref 45–117)
ALT SERPL-CCNC: 17 U/L (ref 12–78)
ANION GAP SERPL CALC-SCNC: 9 MMOL/L (ref 8–16)
AST SERPL-CCNC: 18 U/L (ref 15–37)
BILIRUB SERPL-MCNC: 0.2 MG/DL (ref 0.2–1)
BUN SERPL-MCNC: 7 MG/DL (ref 7–18)
CALCIUM SERPL-MCNC: 8.5 MG/DL (ref 8.5–10.1)
CHLORIDE SERPL-SCNC: 102 MMOL/L (ref 98–107)
CO2 SERPL-SCNC: 28 MMOL/L (ref 21–32)
CREAT SERPL-MCNC: 0.4 MG/DL (ref 0.55–1.02)
DEPRECATED RDW RBC AUTO: 13.9 % (ref 11.6–15.6)
GLUCOSE SERPL-MCNC: 140 MG/DL (ref 74–106)
HCT VFR BLD CALC: 29 % (ref 32.4–45.2)
HGB BLD-MCNC: 9.5 GM/DL (ref 10.7–15.3)
MCH RBC QN AUTO: 31.2 PG (ref 25.7–33.7)
MCHC RBC AUTO-ENTMCNC: 32.7 G/DL (ref 32–36)
MCV RBC: 95.4 FL (ref 80–96)
PLATELET # BLD AUTO: 287 K/MM3 (ref 134–434)
PMV BLD: 8.1 FL (ref 7.5–11.1)
POTASSIUM SERPLBLD-SCNC: 3.9 MMOL/L (ref 3.5–5.1)
PROT SERPL-MCNC: 5.9 G/DL (ref 6.4–8.2)
RBC # BLD AUTO: 3.04 M/MM3 (ref 3.6–5.2)
SODIUM SERPL-SCNC: 139 MMOL/L (ref 136–145)
WBC # BLD AUTO: 9.5 K/MM3 (ref 4–10)

## 2018-01-24 RX ADMIN — METHYLPREDNISOLONE SODIUM SUCCINATE SCH MG: 40 INJECTION, POWDER, FOR SOLUTION INTRAMUSCULAR; INTRAVENOUS at 02:34

## 2018-01-24 RX ADMIN — COLLAGENASE SANTYL SCH APPLIC: 250 OINTMENT TOPICAL at 12:04

## 2018-01-24 RX ADMIN — HEPARIN SODIUM SCH UNIT: 5000 INJECTION, SOLUTION INTRAVENOUS; SUBCUTANEOUS at 11:51

## 2018-01-24 RX ADMIN — ONDANSETRON HYDROCHLORIDE SCH MG: 4 TABLET, FILM COATED ORAL at 21:06

## 2018-01-24 RX ADMIN — RANITIDINE SCH MG: 150 TABLET ORAL at 11:54

## 2018-01-24 RX ADMIN — SIMETHICONE CHEW TAB 80 MG SCH MG: 80 TABLET ORAL at 21:05

## 2018-01-24 RX ADMIN — ACETAMINOPHEN SCH MG: 325 TABLET ORAL at 06:31

## 2018-01-24 RX ADMIN — METHYLPREDNISOLONE SODIUM SUCCINATE SCH MG: 40 INJECTION, POWDER, FOR SOLUTION INTRAMUSCULAR; INTRAVENOUS at 17:15

## 2018-01-24 RX ADMIN — SIMETHICONE CHEW TAB 80 MG SCH MG: 80 TABLET ORAL at 11:51

## 2018-01-24 RX ADMIN — IPRATROPIUM BROMIDE AND ALBUTEROL SULFATE SCH AMP: .5; 3 SOLUTION RESPIRATORY (INHALATION) at 20:29

## 2018-01-24 RX ADMIN — CELECOXIB SCH MG: 100 CAPSULE ORAL at 21:45

## 2018-01-24 RX ADMIN — ONDANSETRON HYDROCHLORIDE SCH MG: 4 TABLET, FILM COATED ORAL at 06:31

## 2018-01-24 RX ADMIN — METHYLPREDNISOLONE SODIUM SUCCINATE SCH MG: 40 INJECTION, POWDER, FOR SOLUTION INTRAMUSCULAR; INTRAVENOUS at 11:54

## 2018-01-24 RX ADMIN — CELECOXIB SCH MG: 100 CAPSULE ORAL at 11:52

## 2018-01-24 RX ADMIN — DOCUSATE SODIUM SCH MG: 100 CAPSULE, LIQUID FILLED ORAL at 11:53

## 2018-01-24 RX ADMIN — Medication SCH TAB: at 11:53

## 2018-01-24 RX ADMIN — AMLODIPINE BESYLATE SCH MG: 2.5 TABLET ORAL at 11:51

## 2018-01-24 RX ADMIN — DEXTROSE AND SODIUM CHLORIDE SCH: 5; 900 INJECTION, SOLUTION INTRAVENOUS at 16:36

## 2018-01-24 RX ADMIN — SIMETHICONE CHEW TAB 80 MG SCH MG: 80 TABLET ORAL at 14:46

## 2018-01-24 RX ADMIN — VANCOMYCIN HYDROCHLORIDE SCH MLS/HR: 1 INJECTION, POWDER, LYOPHILIZED, FOR SOLUTION INTRAVENOUS at 17:14

## 2018-01-24 RX ADMIN — MULTIVITAMIN TABLET SCH TAB: TABLET at 11:51

## 2018-01-24 RX ADMIN — SIMETHICONE CHEW TAB 80 MG SCH MG: 80 TABLET ORAL at 17:14

## 2018-01-24 RX ADMIN — IPRATROPIUM BROMIDE AND ALBUTEROL SULFATE SCH AMP: .5; 3 SOLUTION RESPIRATORY (INHALATION) at 15:35

## 2018-01-24 RX ADMIN — HEPARIN SODIUM SCH UNIT: 5000 INJECTION, SOLUTION INTRAVENOUS; SUBCUTANEOUS at 21:03

## 2018-01-24 RX ADMIN — GABAPENTIN SCH MG: 400 CAPSULE ORAL at 21:05

## 2018-01-24 RX ADMIN — SENNOSIDES SCH: 8.6 TABLET, FILM COATED ORAL at 21:09

## 2018-01-24 RX ADMIN — ACETAMINOPHEN SCH MG: 325 TABLET ORAL at 14:46

## 2018-01-24 RX ADMIN — ACETAMINOPHEN SCH MG: 325 TABLET ORAL at 21:05

## 2018-01-24 RX ADMIN — IPRATROPIUM BROMIDE AND ALBUTEROL SULFATE SCH AMP: .5; 3 SOLUTION RESPIRATORY (INHALATION) at 11:10

## 2018-01-24 RX ADMIN — GABAPENTIN SCH MG: 400 CAPSULE ORAL at 11:52

## 2018-01-24 RX ADMIN — ONDANSETRON HYDROCHLORIDE SCH MG: 4 TABLET, FILM COATED ORAL at 14:47

## 2018-01-24 RX ADMIN — IPRATROPIUM BROMIDE AND ALBUTEROL SULFATE SCH AMP: .5; 3 SOLUTION RESPIRATORY (INHALATION) at 07:40

## 2018-01-24 NOTE — PATH
Surgical Pathology Report



Patient Name:  AIDEE THAKKAR

Accession #:  

Med. Rec. #:  [id number]                                                        

   /Age/Gender:  1931 (Age: 86) / F

Account:  [id number]                                                          

             Location: [address] [place] [address]/[facility]

Taken:  2018

Received:  2018

Reported:  2018

Physicians:   ANA Fritz DPM

  



Specimen(s) Received

A: BONE BX 

B: DEBRIDEMENT TISSUE RIGHT FOOT 





Clinical History

Wound infection right foot







Final Diagnosis

A. FOOT, BONE, RIGHT, BIOPSY:

ACUTE AND CHRONIC OSTEOMYELITIS WITH FOCAL MARROW FIBROSIS.



B. FOOT, SOFT TISSUE, RIGHT, BIOPSY:

SKIN AND UNDERLYING SOFT TISSUE WITH ACUTE AND CHRONIC NECROTIZING INFLAMMATION.







***Electronically Signed***

Katie Mejia M.D.





Gross Description

A. Received in formalin labeled "bone biopsy right foot," is a 1.7 x 1.5 x 0.2

cm aggregate of tan bone fragments. The formalin is filtered and the specimen is

entirely submitted in one cassette, following decalcification.



B. Received in formalin labeled "soft tissue right foot," is a 2.5 x 2.0 x 0.3

cm aggregate of tan-gray, necrotic fragments of possible skin and soft tissue.

The specimen is entirely submitted in one cassette.

## 2018-01-25 LAB
ANION GAP SERPL CALC-SCNC: 8 MMOL/L (ref 8–16)
BUN SERPL-MCNC: 14 MG/DL (ref 7–18)
CALCIUM SERPL-MCNC: 8 MG/DL (ref 8.5–10.1)
CHLORIDE SERPL-SCNC: 103 MMOL/L (ref 98–107)
CO2 SERPL-SCNC: 27 MMOL/L (ref 21–32)
CREAT SERPL-MCNC: 0.6 MG/DL (ref 0.55–1.02)
DEPRECATED RDW RBC AUTO: 14 % (ref 11.6–15.6)
GLUCOSE SERPL-MCNC: 136 MG/DL (ref 74–106)
HCT VFR BLD CALC: 29.4 % (ref 32.4–45.2)
HGB BLD-MCNC: 9.4 GM/DL (ref 10.7–15.3)
MCH RBC QN AUTO: 30.8 PG (ref 25.7–33.7)
MCHC RBC AUTO-ENTMCNC: 31.8 G/DL (ref 32–36)
MCV RBC: 97 FL (ref 80–96)
PLATELET # BLD AUTO: 319 K/MM3 (ref 134–434)
PMV BLD: 8.1 FL (ref 7.5–11.1)
POTASSIUM SERPLBLD-SCNC: 4.3 MMOL/L (ref 3.5–5.1)
RBC # BLD AUTO: 3.03 M/MM3 (ref 3.6–5.2)
SODIUM SERPL-SCNC: 138 MMOL/L (ref 136–145)
WBC # BLD AUTO: 13.8 K/MM3 (ref 4–10)

## 2018-01-25 RX ADMIN — AMLODIPINE BESYLATE SCH MG: 2.5 TABLET ORAL at 10:27

## 2018-01-25 RX ADMIN — HEPARIN SODIUM SCH UNIT: 5000 INJECTION, SOLUTION INTRAVENOUS; SUBCUTANEOUS at 10:28

## 2018-01-25 RX ADMIN — DOCUSATE SODIUM SCH MG: 100 CAPSULE, LIQUID FILLED ORAL at 10:27

## 2018-01-25 RX ADMIN — SIMETHICONE CHEW TAB 80 MG SCH MG: 80 TABLET ORAL at 13:56

## 2018-01-25 RX ADMIN — SIMETHICONE CHEW TAB 80 MG SCH MG: 80 TABLET ORAL at 18:19

## 2018-01-25 RX ADMIN — METHYLPREDNISOLONE SODIUM SUCCINATE SCH MG: 40 INJECTION, POWDER, FOR SOLUTION INTRAMUSCULAR; INTRAVENOUS at 21:30

## 2018-01-25 RX ADMIN — IPRATROPIUM BROMIDE AND ALBUTEROL SULFATE SCH AMP: .5; 3 SOLUTION RESPIRATORY (INHALATION) at 11:10

## 2018-01-25 RX ADMIN — ACETAMINOPHEN SCH MG: 325 TABLET ORAL at 13:59

## 2018-01-25 RX ADMIN — SIMETHICONE CHEW TAB 80 MG SCH MG: 80 TABLET ORAL at 21:28

## 2018-01-25 RX ADMIN — COLLAGENASE SANTYL SCH APPLIC: 250 OINTMENT TOPICAL at 12:23

## 2018-01-25 RX ADMIN — ONDANSETRON HYDROCHLORIDE SCH MG: 4 TABLET, FILM COATED ORAL at 13:59

## 2018-01-25 RX ADMIN — CELECOXIB SCH MG: 100 CAPSULE ORAL at 10:29

## 2018-01-25 RX ADMIN — RANITIDINE SCH MG: 150 TABLET ORAL at 10:29

## 2018-01-25 RX ADMIN — VANCOMYCIN HYDROCHLORIDE SCH MLS/HR: 1 INJECTION, POWDER, LYOPHILIZED, FOR SOLUTION INTRAVENOUS at 21:19

## 2018-01-25 RX ADMIN — IPRATROPIUM BROMIDE AND ALBUTEROL SULFATE SCH AMP: .5; 3 SOLUTION RESPIRATORY (INHALATION) at 16:00

## 2018-01-25 RX ADMIN — IPRATROPIUM BROMIDE AND ALBUTEROL SULFATE SCH AMP: .5; 3 SOLUTION RESPIRATORY (INHALATION) at 07:40

## 2018-01-25 RX ADMIN — ACETAMINOPHEN SCH MG: 325 TABLET ORAL at 21:29

## 2018-01-25 RX ADMIN — ACETAMINOPHEN SCH MG: 325 TABLET ORAL at 05:30

## 2018-01-25 RX ADMIN — IPRATROPIUM BROMIDE AND ALBUTEROL SULFATE SCH AMP: .5; 3 SOLUTION RESPIRATORY (INHALATION) at 20:26

## 2018-01-25 RX ADMIN — GABAPENTIN SCH MG: 400 CAPSULE ORAL at 10:27

## 2018-01-25 RX ADMIN — MULTIVITAMIN TABLET SCH TAB: TABLET at 10:29

## 2018-01-25 RX ADMIN — SIMETHICONE CHEW TAB 80 MG SCH MG: 80 TABLET ORAL at 10:28

## 2018-01-25 RX ADMIN — METHYLPREDNISOLONE SODIUM SUCCINATE SCH MG: 40 INJECTION, POWDER, FOR SOLUTION INTRAMUSCULAR; INTRAVENOUS at 10:02

## 2018-01-25 RX ADMIN — SENNOSIDES SCH TAB: 8.6 TABLET, FILM COATED ORAL at 21:28

## 2018-01-25 RX ADMIN — HEPARIN SODIUM SCH UNIT: 5000 INJECTION, SOLUTION INTRAVENOUS; SUBCUTANEOUS at 21:31

## 2018-01-25 RX ADMIN — METHYLPREDNISOLONE SODIUM SUCCINATE SCH MG: 40 INJECTION, POWDER, FOR SOLUTION INTRAMUSCULAR; INTRAVENOUS at 02:43

## 2018-01-25 RX ADMIN — Medication SCH TAB: at 10:29

## 2018-01-25 RX ADMIN — CELECOXIB SCH: 100 CAPSULE ORAL at 22:00

## 2018-01-25 RX ADMIN — ONDANSETRON HYDROCHLORIDE SCH MG: 4 TABLET, FILM COATED ORAL at 21:30

## 2018-01-25 RX ADMIN — ONDANSETRON HYDROCHLORIDE SCH MG: 4 TABLET, FILM COATED ORAL at 05:30

## 2018-01-25 RX ADMIN — METHYLPREDNISOLONE SODIUM SUCCINATE SCH: 40 INJECTION, POWDER, FOR SOLUTION INTRAMUSCULAR; INTRAVENOUS at 15:23

## 2018-01-25 RX ADMIN — GABAPENTIN SCH MG: 400 CAPSULE ORAL at 21:28

## 2018-01-26 LAB
ANION GAP SERPL CALC-SCNC: 6 MMOL/L (ref 8–16)
BUN SERPL-MCNC: 16 MG/DL (ref 7–18)
CALCIUM SERPL-MCNC: 8.1 MG/DL (ref 8.5–10.1)
CHLORIDE SERPL-SCNC: 104 MMOL/L (ref 98–107)
CO2 SERPL-SCNC: 29 MMOL/L (ref 21–32)
CREAT SERPL-MCNC: 0.6 MG/DL (ref 0.55–1.02)
DEPRECATED RDW RBC AUTO: 14.2 % (ref 11.6–15.6)
ERYTHROCYTE [SEDIMENTATION RATE] IN BLOOD BY WESTERGREN METHOD: 74 MM/HR (ref 0–30)
GLUCOSE SERPL-MCNC: 120 MG/DL (ref 74–106)
HCT VFR BLD CALC: 28.8 % (ref 32.4–45.2)
HGB BLD-MCNC: 9.2 GM/DL (ref 10.7–15.3)
MCH RBC QN AUTO: 30.9 PG (ref 25.7–33.7)
MCHC RBC AUTO-ENTMCNC: 32.1 G/DL (ref 32–36)
MCV RBC: 96.2 FL (ref 80–96)
PLATELET # BLD AUTO: 339 K/MM3 (ref 134–434)
PMV BLD: 8.3 FL (ref 7.5–11.1)
POTASSIUM SERPLBLD-SCNC: 4.5 MMOL/L (ref 3.5–5.1)
RBC # BLD AUTO: 2.99 M/MM3 (ref 3.6–5.2)
SODIUM SERPL-SCNC: 139 MMOL/L (ref 136–145)
WBC # BLD AUTO: 15.1 K/MM3 (ref 4–10)

## 2018-01-26 PROCEDURE — B518YZA FLUOROSCOPY OF SUPERIOR VENA CAVA USING OTHER CONTRAST, GUIDANCE: ICD-10-PCS | Performed by: RADIOLOGY

## 2018-01-26 PROCEDURE — 02HV33Z INSERTION OF INFUSION DEVICE INTO SUPERIOR VENA CAVA, PERCUTANEOUS APPROACH: ICD-10-PCS | Performed by: RADIOLOGY

## 2018-01-26 RX ADMIN — MULTIVITAMIN TABLET SCH TAB: TABLET at 09:43

## 2018-01-26 RX ADMIN — HEPARIN SODIUM SCH UNIT: 5000 INJECTION, SOLUTION INTRAVENOUS; SUBCUTANEOUS at 23:01

## 2018-01-26 RX ADMIN — ACETAMINOPHEN SCH MG: 325 TABLET ORAL at 23:01

## 2018-01-26 RX ADMIN — VANCOMYCIN HYDROCHLORIDE SCH MLS/HR: 1 INJECTION, POWDER, LYOPHILIZED, FOR SOLUTION INTRAVENOUS at 18:22

## 2018-01-26 RX ADMIN — CELECOXIB SCH: 100 CAPSULE ORAL at 22:00

## 2018-01-26 RX ADMIN — COLLAGENASE SANTYL SCH APPLIC: 250 OINTMENT TOPICAL at 09:56

## 2018-01-26 RX ADMIN — SENNOSIDES SCH TAB: 8.6 TABLET, FILM COATED ORAL at 23:00

## 2018-01-26 RX ADMIN — CELECOXIB SCH MG: 100 CAPSULE ORAL at 09:43

## 2018-01-26 RX ADMIN — AMLODIPINE BESYLATE SCH MG: 2.5 TABLET ORAL at 09:43

## 2018-01-26 RX ADMIN — IPRATROPIUM BROMIDE AND ALBUTEROL SULFATE SCH AMP: .5; 3 SOLUTION RESPIRATORY (INHALATION) at 07:56

## 2018-01-26 RX ADMIN — GABAPENTIN SCH MG: 400 CAPSULE ORAL at 23:00

## 2018-01-26 RX ADMIN — DOCUSATE SODIUM SCH MG: 100 CAPSULE, LIQUID FILLED ORAL at 09:42

## 2018-01-26 RX ADMIN — SIMETHICONE CHEW TAB 80 MG SCH MG: 80 TABLET ORAL at 22:59

## 2018-01-26 RX ADMIN — ONDANSETRON HYDROCHLORIDE SCH MG: 4 TABLET, FILM COATED ORAL at 23:01

## 2018-01-26 RX ADMIN — GABAPENTIN SCH MG: 400 CAPSULE ORAL at 09:43

## 2018-01-26 RX ADMIN — METHYLPREDNISOLONE SODIUM SUCCINATE SCH MG: 40 INJECTION, POWDER, FOR SOLUTION INTRAMUSCULAR; INTRAVENOUS at 09:44

## 2018-01-26 RX ADMIN — SIMETHICONE CHEW TAB 80 MG SCH MG: 80 TABLET ORAL at 09:43

## 2018-01-26 RX ADMIN — IPRATROPIUM BROMIDE AND ALBUTEROL SULFATE SCH AMP: .5; 3 SOLUTION RESPIRATORY (INHALATION) at 11:20

## 2018-01-26 RX ADMIN — HEPARIN SODIUM SCH UNIT: 5000 INJECTION, SOLUTION INTRAVENOUS; SUBCUTANEOUS at 09:44

## 2018-01-26 RX ADMIN — ACETAMINOPHEN SCH: 325 TABLET ORAL at 07:43

## 2018-01-26 RX ADMIN — SIMETHICONE CHEW TAB 80 MG SCH MG: 80 TABLET ORAL at 18:22

## 2018-01-26 RX ADMIN — ONDANSETRON HYDROCHLORIDE SCH: 4 TABLET, FILM COATED ORAL at 07:43

## 2018-01-26 RX ADMIN — IPRATROPIUM BROMIDE AND ALBUTEROL SULFATE SCH: .5; 3 SOLUTION RESPIRATORY (INHALATION) at 16:26

## 2018-01-26 RX ADMIN — IPRATROPIUM BROMIDE AND ALBUTEROL SULFATE SCH AMP: .5; 3 SOLUTION RESPIRATORY (INHALATION) at 20:46

## 2018-01-26 RX ADMIN — SIMETHICONE CHEW TAB 80 MG SCH MG: 80 TABLET ORAL at 13:12

## 2018-01-26 RX ADMIN — Medication SCH TAB: at 09:43

## 2018-01-26 RX ADMIN — ONDANSETRON HYDROCHLORIDE SCH MG: 4 TABLET, FILM COATED ORAL at 13:11

## 2018-01-26 RX ADMIN — RANITIDINE SCH MG: 150 TABLET ORAL at 09:43

## 2018-01-26 RX ADMIN — ACETAMINOPHEN SCH MG: 325 TABLET ORAL at 13:11

## 2018-01-27 VITALS — HEART RATE: 82 BPM | SYSTOLIC BLOOD PRESSURE: 101 MMHG | TEMPERATURE: 98.2 F | DIASTOLIC BLOOD PRESSURE: 57 MMHG

## 2018-01-27 RX ADMIN — SIMETHICONE CHEW TAB 80 MG SCH: 80 TABLET ORAL at 18:34

## 2018-01-27 RX ADMIN — SIMETHICONE CHEW TAB 80 MG SCH MG: 80 TABLET ORAL at 15:05

## 2018-01-27 RX ADMIN — AMLODIPINE BESYLATE SCH MG: 2.5 TABLET ORAL at 10:54

## 2018-01-27 RX ADMIN — MULTIVITAMIN TABLET SCH TAB: TABLET at 10:53

## 2018-01-27 RX ADMIN — VANCOMYCIN HYDROCHLORIDE SCH: 1 INJECTION, POWDER, LYOPHILIZED, FOR SOLUTION INTRAVENOUS at 18:37

## 2018-01-27 RX ADMIN — ONDANSETRON HYDROCHLORIDE SCH MG: 4 TABLET, FILM COATED ORAL at 15:06

## 2018-01-27 RX ADMIN — ACETAMINOPHEN SCH: 325 TABLET ORAL at 08:13

## 2018-01-27 RX ADMIN — IPRATROPIUM BROMIDE AND ALBUTEROL SULFATE SCH AMP: .5; 3 SOLUTION RESPIRATORY (INHALATION) at 07:54

## 2018-01-27 RX ADMIN — IPRATROPIUM BROMIDE AND ALBUTEROL SULFATE SCH: .5; 3 SOLUTION RESPIRATORY (INHALATION) at 16:06

## 2018-01-27 RX ADMIN — HEPARIN SODIUM SCH UNIT: 5000 INJECTION, SOLUTION INTRAVENOUS; SUBCUTANEOUS at 10:54

## 2018-01-27 RX ADMIN — Medication SCH TAB: at 10:53

## 2018-01-27 RX ADMIN — DOCUSATE SODIUM SCH MG: 100 CAPSULE, LIQUID FILLED ORAL at 10:52

## 2018-01-27 RX ADMIN — ACETAMINOPHEN SCH MG: 325 TABLET ORAL at 15:05

## 2018-01-27 RX ADMIN — COLLAGENASE SANTYL SCH APPLIC: 250 OINTMENT TOPICAL at 11:01

## 2018-01-27 RX ADMIN — RANITIDINE SCH MG: 150 TABLET ORAL at 10:53

## 2018-01-27 RX ADMIN — COLLAGENASE SANTYL SCH: 250 OINTMENT TOPICAL at 10:57

## 2018-01-27 RX ADMIN — COLLAGENASE SANTYL SCH: 250 OINTMENT TOPICAL at 11:02

## 2018-01-27 RX ADMIN — CELECOXIB SCH MG: 100 CAPSULE ORAL at 11:03

## 2018-01-27 RX ADMIN — IPRATROPIUM BROMIDE AND ALBUTEROL SULFATE SCH: .5; 3 SOLUTION RESPIRATORY (INHALATION) at 11:56

## 2018-01-27 RX ADMIN — GABAPENTIN SCH MG: 400 CAPSULE ORAL at 10:53

## 2018-01-27 RX ADMIN — ONDANSETRON HYDROCHLORIDE SCH: 4 TABLET, FILM COATED ORAL at 08:13

## 2018-01-27 RX ADMIN — SIMETHICONE CHEW TAB 80 MG SCH MG: 80 TABLET ORAL at 11:00

## 2018-01-27 NOTE — DS
Physical Examination


Vital Signs: 


 Vital Signs











Temperature  98.7 F   01/27/18 06:00


 


Pulse Rate  85   01/27/18 06:00


 


Respiratory Rate  18   01/27/18 06:00


 


Blood Pressure  138/72   01/27/18 06:00


 


O2 Sat by Pulse Oximetry (%)  97   01/26/18 09:00











Findings/Remarks: 





Pt asking for pain medication to be available at times.


Pt w/o fever. chill. SOB, CP, abd pain, N, V.


Case care was d/w pt's hospital nurse; pending [facility] approval for transfer back to 

NH.


Case was d/w [facility] 3N nurse (Ashli); pharmacy name was received and Vanco was order 

([facility] pharmacy is closing early today and is not opened on Sunday).


Time spent for coordinating pt's care: over 45 minutes.


Constitutional: Yes: No Distress, Calm


Cardiovascular: Yes: Regular Rate and Rhythm, S1, S2


Respiratory: Yes: Regular, Other (coarse BS).  No: Rales


Gastrointestinal: Yes: Normal Bowel Sounds, Soft.  No: Tenderness


Extremities: Yes: Other (clean right ankle dressing)


Edema: No


Labs: 


 CBC, BMP





 01/26/18 06:30 





 01/26/18 06:30 











Discharge Summary


Reason For Visit: WOUND


Current Active Problems





Anemia (Acute)


Bronchospasm (Acute)


Cough (Acute)


Hypertension (Acute)


Hypoalbuminemia (Acute)


Osteomyelitis (Acute)


PVD (peripheral vascular disease) (Acute)


Wound abscess (Acute)








Procedures: Principal: Right leg MRI.  Rigth foot XR.  CXR


Hospital Course: 





Pt [facility] (by Dr. Hoyos) to ER for evaluation of right 

ankle ulcer, R/O OM, after she was treated with Bactrim ( at NH) for 4 weeks. 

Pt was admitted, evaluated by ID (Dr. Wallace), Podiatry (Dr. Padilla), Sx (

Dr. Hoyos); pt underwent OR boner BX and wound debridment


; bone BX + for MRSA. Pt had PICC line placed. Pt with cough, evaluated by 

Pulmonary (Dr. Stokes/ Bee), was given a trial of steroids, and felt better;

steroids would be tapered off; pt would need further evaluation. Pt to be DC'ed 

back to [facility] with Vanco IV daily for 6 weeks, F/u with Wound center 

next week. To monitor Vanco level and ESR/ CRP weekly.


Condition: Fair





- Instructions


Diet, Activity, Other Instructions: 


Local leg wound care per hospital instructions.


Weekly Vanco level, ESR, CRP,CBC, CMP


Resume diet 


Referrals: 


Indra Andujar MD [Primary Care Provider] - 


Stephen Villa MD [Staff Physician] -  (this coming week in [facility] 

at [facility])


Miguel Stokes MD [Staff Physician] -  (in 2-4 weeks)


Disposition: SKILLED NURSING FACILITY





- Home Medications


Comprehensive Discharge Medication List: 


Ambulatory Orders





this list might NOT be accurate.





Gabapentin [Neurontin -] 400 mg PO BID 06/09/14 


Sennosides [Senna -] 1 mg PO HS 06/09/14 


Simethicone [Gas Relief] 2 tab PO QID 06/09/14 


Acetaminophen [Tylenol] 650 mg PO TID 01/15/18 


Amlodipine Besylate [Norvasc -] 2.5 mg PO DAILY 01/15/18 


Ascorbate Calcium [Vitamin C] 500 mg PO DAILY 01/15/18 


Bacitracin - [Bacitracin Topical Ointment -] 1 applic TP DAILY 01/15/18 


Calcium Polycarbophil [Fiber Laxative] 625 mg PO HS 01/15/18 


Celecoxib [Celebrex] 100 mg PO BID 01/15/18 


Cholecalciferol (Vitamin D3) [Vitamin D3] 1,000 unit PO DAILY 01/15/18 


Collagenase Clostridium Hist. [Santyl] 1 applic TP DAILY 01/15/18 


Cyanocobalamin (Vitamin B-12) [Vitamin B-12] 1,000 mcg PO DAILY 01/15/18 


Docusate Liquid [Colace Liquid -] 300 mg PO HS 01/15/18 


Heparin Sodium,Porcine/Pf [Heparin Sod 5,000 Unit/ 0.5 ml] 5,000 unit SQ BID 01/

15/18 


Hydromorphone [Dilaudid -] 4 mg PO Q4H 01/15/18 


Lactobacillus Rhamnosus GG [Culturelle] 2 each PO DAILY 01/15/18 


Multivitamins [Tab-A-Vit -] 1 tab PO DAILY 01/15/18 


Ondansetron [Zofran -] 4 mg PO TID 01/15/18 


Ranitidine HCl 150 mg PO DAILY 01/15/18 


Vit B Comp/C/Folic/Iron/Vit E [Vitamin B Complex Tablet] 1 each PO DAILY 01/15/

18

## 2018-02-20 ENCOUNTER — HOSPITAL ENCOUNTER (EMERGENCY)
Dept: HOSPITAL 74 - JER | Age: 83
Discharge: NURSING, REHAB OR LONG-TERM CARE | End: 2018-02-20
Payer: COMMERCIAL

## 2018-02-20 VITALS — HEART RATE: 83 BPM | TEMPERATURE: 98.9 F | DIASTOLIC BLOOD PRESSURE: 74 MMHG | SYSTOLIC BLOOD PRESSURE: 119 MMHG

## 2018-02-20 VITALS — BODY MASS INDEX: 20.7 KG/M2

## 2018-02-20 DIAGNOSIS — I10: ICD-10-CM

## 2018-02-20 DIAGNOSIS — Z99.3: ICD-10-CM

## 2018-02-20 DIAGNOSIS — M86.9: ICD-10-CM

## 2018-02-20 DIAGNOSIS — Z85.820: ICD-10-CM

## 2018-02-20 DIAGNOSIS — Z79.2: ICD-10-CM

## 2018-02-20 DIAGNOSIS — M21.851: Primary | ICD-10-CM

## 2018-02-20 DIAGNOSIS — M13.0: ICD-10-CM

## 2018-02-20 DIAGNOSIS — Z96.643: ICD-10-CM

## 2018-02-20 DIAGNOSIS — T14.8XXA: ICD-10-CM

## 2018-02-20 NOTE — PDOC
Attending Attestation





- Resident


Resident Name: OrrosalineJeyson





- ED Attending Attestation


I have performed the following: I have examined & evaluated the patient, The 

case was reviewed & discussed with the resident, I agree w/resident's findings 

& plan, Exceptions are as noted





- HPI


HPI: 





02/20/18 16:02


Ms gonzales was sent to the Er from the podiatry office for evaluation of her 

right hip


Pt has a h/o PVD, LE ulcers, h/o CVA


S/p hip replacement in the 1980s


Pt was seen today by podiatry, noted to have internal rotation of her right hip 


Pt states movement is painful


She states, she has been this way for months


She denies trauma








- Physicial Exam


PE: 





02/20/18 16:11


GENERAL: The patient is in no acute distress.


HEAD: Normal with no signs of trauma.


EYES: PERRLA, EOMI, sclera anicteric, conjunctiva clear.


NECK: Normal range of motion, supple without lymphadenopathy, JVD, or masses.


LUNGS: Breath sounds equal, clear to auscultation bilaterally.  No wheezes, and 

no crackles.


HEART:Regular rate and rhythm, normal S1 and S2 without murmur, rub or gallop.


ABDOMEN: Soft, nontender, 


EXTREMITIES: Right hip internal rotation, toes down going 


SKIN: Dorsum of right foor open wound measuring 2cm in diameter, no drainage


Also, left calf skin defect measuring 4cm, no surrounding erythema





- Medical Decision Making





02/20/18 16:13


This patient was sent from the podiatrist's office for x-rays per request of Dr Nicole


Ms gonzales refuses to get out of the wheel chair, stating she must have a 

ish lift


The xray tech refuses to perform the x rays with the patient's pants on


Pt refuses to remove pants





Pt will be sent back to the nursing home where x rays can be ordered there


Nursing home alerted to the plan 





clinical impression: chronic hip deformity, initial presentation

## 2018-02-20 NOTE — PDOC
History of Present Illness





- General


History Source: Patient


Exam Limitations: No Limitations





- History of Present Illness


Initial Comments: 





02/20/18 12:26


The patient is an 86F with a PMH of HTN, osteo (on vanc via PICC line), 

multiple wounds (L and R foot) who presents to the ER via her podiatrists office

, Dr. Bustamante. The patient states that her podiatrist sent her because he 

thought her R hip was out of place. She denies any current complaints including 

R hip pain, numbness, tingling, weakness, coldness in her foot/distal 

extremity. 





<Jeyson Tavarez - Last Filed: 02/20/18 13:39>





<Corina Mejia - Last Filed: 02/20/18 14:04>





- General


Chief Complaint: Pain


Stated Complaint: RT HIP PAIN


Time Seen by Provider: 02/20/18 12:01





Past History





- Past Medical History


Anemia: No


Asthma: No


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


Cardiac Disorders: No


CVA: No


COPD: No


CHF: No


Dementia: No


Diabetes: No


GI Disorders: No


 Disorders: No


HTN: Yes


Hypercholesterolemia: No


Liver Disease: No


Seizures: No


Thyroid Disease: No





- Surgical History


Abdominal Surgery: Yes (appendectomy 10/2014)


Appendectomy: Yes


Cardiac Surgery: No


Cholecystectomy: Yes


Lung Surgery: No


Neurologic Surgery: No


Orthopedic Surgery: Yes (bilateral hip knee replacement)





- Immunization History


Immunization Up to Date: Yes





- Suicide/Smoking/Psychosocial Hx


Smoking History: Never smoked


Have you smoked in the past 12 months: No


Hx Alcohol Use: No


Drug/Substance Use Hx: No


Substance Use Type: None


Hx Substance Use Treatment: No





<Jeyson Tavarez - Last Filed: 02/20/18 13:39>





<Corina Mejia - Last Filed: 02/20/18 14:04>





- Past Medical History


Allergies/Adverse Reactions: 


 Allergies











Allergy/AdvReac Type Severity Reaction Status Date / Time


 


No Known Allergies Allergy   Verified 02/20/18 11:37











Home Medications: 


Ambulatory Orders





Gabapentin [Neurontin -] 400 mg PO BID 06/09/14 


Sennosides [Senna -] 1 mg PO HS 06/09/14 


Simethicone [Gas Relief] 2 tab PO QID 06/09/14 


Acetaminophen [Tylenol] 650 mg PO TID 01/15/18 


Amlodipine Besylate [Norvasc -] 2.5 mg PO DAILY 01/15/18 


Ascorbate Calcium [Vitamin C] 500 mg PO DAILY 01/15/18 


Calcium Polycarbophil [Fiber Laxative] 625 mg PO HS 01/15/18 


Celecoxib [Celebrex] 100 mg PO BID 01/15/18 


Cholecalciferol (Vitamin D3) [Vitamin D3] 1,000 unit PO DAILY 01/15/18 


Cyanocobalamin (Vitamin B-12) [Vitamin B-12] 1,000 mcg PO DAILY 01/15/18 


Docusate Liquid [Colace Liquid -] 300 mg PO HS 01/15/18 


Heparin Sodium,Porcine/Pf [Heparin Sod 5,000 Unit/ 0.5 ml] 5,000 unit SQ BID 01/

15/18 


Multivitamins [Multivit ([facility] Formulary)] 1 tab PO DAILY 01/15/18 


Ondansetron [Zofran -] 4 mg PO TID 01/15/18 


Ranitidine HCl 150 mg PO DAILY 01/15/18 


Vit B Comp/C/Folic/Iron/Vit E [Vitamin B Complex Tablet] 1 each PO DAILY 01/15/

18 


Albuterol 2.5/Ipratropium 0.5 [Duoneb -] 1 amp NEB RQID PRN 10 Days  amp 01/27/ 18 


HYDROmorphone [Dilaudid -] 2 mg PO Q3H PRN #30 tablet MDD 6 01/27/18 


HYDROmorphone [Dilaudid -] 4 mg PO Q3H PRN #30 tablet MDD 6 01/27/18 


Lactobacillus Rhamnosus GG [Culturelle] 2 each PO DAILY 60 Days  capsule 01/27/ 18 


Picc Line Flush [Picc Line Flush -] 8 ml IVPUSH PRN PRN  ml 01/27/18 


Vancomycin 1,000 mg IVPB DAILY@1800 #42 vial MDD 1 01/27/18 


predniSONE [Deltasone -] See Taper PO DAILY 4 Days  tablet 01/27/18 


Mupirocin Ointment [Bactroban Ointment (For Decolonization) -] 1 applic TP BID 

02/06/18 











**Review of Systems





- Review of Systems


Able to Perform ROS?: Yes


Comments:: 





02/20/18 12:47


GENERAL/CONSTITUTIONAL: No fever or chills. No weakness.


HEAD, EYES, EARS, NOSE AND THROAT: No change in vision. No ear pain or 

discharge. No sore throat.


CARDIOVASCULAR: No chest pain, palpitations, or lightheadedness.


RESPIRATORY: No cough, wheezing, shortness of breath, or hemoptysis.


GASTROINTESTINAL: No nausea, vomiting, diarrhea, constipation, or abdominal 

pain. 


GENITOURINARY: No dysuria, frequency, hematuria, or change in urination.


MUSCULOSKELETAL: Positive for polyarthritis.


SKIN: Positive for b/l wounds on LE. 


NEUROLOGIC: No headache, numbness, tingling, weakness, loss of consciousness, 

or change in strength/sensation.


ENDOCRINE: No increased thirst. No abnormal weight change.


HEMATOLOGIC/LYMPHATIC: No anemia, easy bleeding, or history of blood clots.


ALLERGIC/IMMUNOLOGIC: No hives or skin allergy.


Is the patient limited English proficient: No





<Jeyson Tavarez - Last Filed: 02/20/18 13:39>





*Physical Exam





- Vital Signs


 Last Vital Signs











Temp Pulse Resp BP Pulse Ox


 


 98.9 F   83   18   119/74   96 


 


 02/20/18 11:37  02/20/18 11:37  02/20/18 11:37  02/20/18 11:37  02/20/18 11:37














- Physical Exam


Comments: 





02/20/18 13:19


Exam limited by patient in wheelchair and refusing to get out.





GENERAL: Well developed, well nourished. Awake and alert. No acute distress.


HEENT: Normocephalic, atraumatic. Hearing grossly normal. Moist mucous 

membranes. PERRLA, EOMI. No conjunctival pallor. Sclera are non-icteric. 


NECK: Supple. Full ROM. No JVD.


CARDIOVASCULAR: Regular rate and rhythm. No murmurs, rubs, or gallops. 2+ pulse 

DP pulses b/l.


PULMONARY: No evidence of respiratory distress. Lungs clear to auscultation 

bilaterally. No wheezing, rales or rhonchi.


ABDOMINAL: Soft. Non-tender. Non-distended. No rebound or guarding. 


MUSCULOSKELETAL: Normal range of motion at all joints. No bony deformities or 

tenderness. 


EXTREMITIES: No cyanosis. No clubbing. No edema. No calf tenderness. Wound on L 

calf laterally, stage 2, 3x5cm circular. Second wound on R dorsal surface 3x3 

circular stage 3. Entire R leg inwardly rotated.


SKIN: Warm and dry. Normal capillary refill. No rashes. No jaundice. 


NEUROLOGICAL: Alert, awake, appropriate. Cranial nerves 2-12 intact. Normal 

speech. 


PSYCHIATRIC: Cooperative. Good eye contact. Appropriate mood and affect.








<Jeyson Tavarez - Last Filed: 02/20/18 13:39>





- Vital Signs


 Last Vital Signs











Temp Pulse Resp BP Pulse Ox


 


 98.9 F   83   18   119/74   96 


 


 02/20/18 11:37  02/20/18 11:37  02/20/18 11:37  02/20/18 11:37  02/20/18 11:37














<Corina Mejia - Last Filed: 02/20/18 14:04>





ED Treatment Course





- RADIOLOGY


Radiology Studies Ordered: 














 Category Date Time Status


 


 HIP & PELVIS-RIGHT [RAD] Stat Radiology  02/20/18 12:24 Ordered














<Jeyson Tavarez - Last Filed: 02/20/18 13:39>





- Medications


Given in the ED: 


ED Medications














Discontinued Medications














Generic Name Dose Route Start Last Admin





  Trade Name Freq  PRN Reason Stop Dose Admin


 


Hydromorphone HCl  4 mg  02/20/18 12:28  02/20/18 12:41





  Dilaudid -  PO  02/20/18 12:29  4 mg





  ONCE ONE   Administration














<Corina Mejia - Last Filed: 02/20/18 14:04>





Medical Decision Making





- Medical Decision Making





02/20/18 13:17


The patient is an 86F with a PMH including multiple joint replacements who 

presents with months of having her R leg inwardly rotated. She is here for 

imaging to rule out occult dislocation.





Dr. Bustamante (pt's podiatrist) spoke with Dr. Powell (pt ortho surgeon) who 

requests XR's of the entire R leg. Orders placed.





02/20/18 13:34


I have spoken to Jo (3N nurse at [facility]) and explained to her the plan to 

get outpatient XR's since the patient is refusing to get XR's in our facility. 

The nurse and patient agrees to that plan. 





<Jeyson Tavarez - Last Filed: 02/20/18 13:39>





- Medical Decision Making


02/20/18 14:02


Dr. Villa was paged and notified via phone service. 





<Corina Mejia - Last Filed: 02/20/18 14:04>





*DC/Admit/Observation/Transfer





- Discharge Dispostion


Admit: No





<Jeyson Tavarez - Last Filed: 02/20/18 13:39>





<Corina Mejia - Last Filed: 02/20/18 14:04>


Diagnosis at time of Disposition: 


Osteomyelitis


Qualifiers:


 Osteomyelitis type: other Osteomyelitis location: multiple sites Qualified Code

(s): M86.8X0 - Other osteomyelitis, multiple sites








- Discharge Dispostion


Disposition: HOME


Condition at time of disposition: Stable





- Referrals


Referrals: 


Indra Andujar MD [Primary Care Provider] - 





- Patient Instructions


Additional Instructions: 


Please return to the ER if symptoms persist, worsen, or new symptoms arise.





Please follow up with your primary care physician in 2-3 days. 





Please return to the ER if you have any signs or symptoms of chest pain, 

shortness of breath, uncontrollable fever, chills, nausea, vomiting, numbness, 

tingling, or weakness in any part of your body, changes in vision, or slurred 

speech.





The X-rays you should have are:


R hip and pelvis


R femur


R knee (3 views)


R tib/fib


R foot and ankle





- Post Discharge Activity

## 2018-03-30 ENCOUNTER — HOSPITAL ENCOUNTER (INPATIENT)
Dept: HOSPITAL 74 - JER | Age: 83
LOS: 8 days | Discharge: NURSING, REHAB OR LONG-TERM CARE | DRG: 602 | End: 2018-04-07
Attending: SPECIALIST | Admitting: SPECIALIST
Payer: COMMERCIAL

## 2018-03-30 VITALS — BODY MASS INDEX: 20.4 KG/M2

## 2018-03-30 DIAGNOSIS — L97.819: ICD-10-CM

## 2018-03-30 DIAGNOSIS — L89.894: ICD-10-CM

## 2018-03-30 DIAGNOSIS — L03.115: Primary | ICD-10-CM

## 2018-03-30 DIAGNOSIS — L97.829: ICD-10-CM

## 2018-03-30 DIAGNOSIS — R64: ICD-10-CM

## 2018-03-30 LAB
ALBUMIN SERPL-MCNC: 2.4 G/DL (ref 3.4–5)
ALP SERPL-CCNC: 184 U/L (ref 45–117)
ALT SERPL-CCNC: 9 U/L (ref 12–78)
ANION GAP SERPL CALC-SCNC: 7 MMOL/L (ref 8–16)
AST SERPL-CCNC: 24 U/L (ref 15–37)
BASOPHILS # BLD: 0.4 % (ref 0–2)
BILIRUB SERPL-MCNC: 0.3 MG/DL (ref 0.2–1)
BUN SERPL-MCNC: 27 MG/DL (ref 7–18)
CALCIUM SERPL-MCNC: 9.1 MG/DL (ref 8.5–10.1)
CHLORIDE SERPL-SCNC: 100 MMOL/L (ref 98–107)
CO2 SERPL-SCNC: 28 MMOL/L (ref 21–32)
CREAT SERPL-MCNC: 0.6 MG/DL (ref 0.55–1.02)
DEPRECATED RDW RBC AUTO: 13.6 % (ref 11.6–15.6)
EOSINOPHIL # BLD: 0.5 % (ref 0–4.5)
GLUCOSE SERPL-MCNC: 100 MG/DL (ref 74–106)
HCT VFR BLD CALC: 39.4 % (ref 32.4–45.2)
HGB BLD-MCNC: 12.9 GM/DL (ref 10.7–15.3)
LYMPHOCYTES # BLD: 7.1 % (ref 8–40)
MCH RBC QN AUTO: 32.8 PG (ref 25.7–33.7)
MCHC RBC AUTO-ENTMCNC: 32.7 G/DL (ref 32–36)
MCV RBC: 100.3 FL (ref 80–96)
MONOCYTES # BLD AUTO: 6.5 % (ref 3.8–10.2)
NEUTROPHILS # BLD: 85.5 % (ref 42.8–82.8)
PLATELET # BLD AUTO: 342 K/MM3 (ref 134–434)
PMV BLD: 8.5 FL (ref 7.5–11.1)
POTASSIUM SERPLBLD-SCNC: 4.3 MMOL/L (ref 3.5–5.1)
PROT SERPL-MCNC: 6.5 G/DL (ref 6.4–8.2)
RBC # BLD AUTO: 3.93 M/MM3 (ref 3.6–5.2)
SODIUM SERPL-SCNC: 135 MMOL/L (ref 136–145)
WBC # BLD AUTO: 15.9 K/MM3 (ref 4–10)

## 2018-03-30 PROCEDURE — G0480 DRUG TEST DEF 1-7 CLASSES: HCPCS

## 2018-03-30 RX ADMIN — GABAPENTIN SCH MG: 400 CAPSULE ORAL at 23:09

## 2018-03-30 RX ADMIN — SENNOSIDES SCH TAB: 8.6 TABLET, FILM COATED ORAL at 23:09

## 2018-03-30 RX ADMIN — DOCUSATE SODIUM LIQUID SCH MG: 100 LIQUID ORAL at 23:10

## 2018-03-30 RX ADMIN — HEPARIN SODIUM SCH UNIT: 5000 INJECTION, SOLUTION INTRAVENOUS; SUBCUTANEOUS at 23:10

## 2018-03-30 RX ADMIN — VANCOMYCIN HYDROCHLORIDE SCH MLS/HR: 1 INJECTION, POWDER, LYOPHILIZED, FOR SOLUTION INTRAVENOUS at 17:44

## 2018-03-30 RX ADMIN — ACETAMINOPHEN PRN MG: 325 TABLET ORAL at 23:10

## 2018-03-30 NOTE — PDOC
History of Present Illness





- General


History Source: Patient


Exam Limitations: No Limitations





- History of Present Illness


Initial Comments: 


03/30/18 12:05


The patient is an 86-year-old female, with a significant past medical history 

of multiple pressure ulcers, chronic MRSA osteomyelitis s/p 6 weeks of 

vancomycin, who was seen and followed up at the wound center today by Dr. Avila Hoyos. The patient came with increasing pain, swelling, redness, and warmth in 

the right leg and was sent to the ER for admission to the hospital. While was 

noted to have a fever while at the wound center. Patient reports that she is 

eating and drinking normally. 





The patient denies any chills, nausea, vomiting, diarrhea, or abdominal pain. 


Denies any chest pain or shortness of breath. 





Allergies: NKA


PCP: Dr. Indra Andujar











<Corina Mejia - Last Filed: 03/30/18 12:53>





- General


History Source: Patient


Exam Limitations: No Limitations





<Heidi Alvarez - Last Filed: 04/01/18 19:11>





- General


Chief Complaint: Wound


Stated Complaint: admission RT ANKLE WOUND (PCP SENT)


Time Seen by Provider: 03/30/18 11:26





Past History





<Corina Mejia - Last Filed: 03/30/18 12:53>





- Past Medical History


Anemia: No


Asthma: No


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


Cardiac Disorders: No


CVA: No


COPD: No


CHF: No


DVT: No


Dementia: No


Diabetes: No


GI Disorders: No


 Disorders: No


HTN: Yes


Hypercholesterolemia: No


Liver Disease: No


Seizures: No


Thyroid Disease: No





- Surgical History


Abdominal Surgery: Yes (appendectomy 10/2014)


Appendectomy: Yes


Cardiac Surgery: No


Cholecystectomy: Yes


Lung Surgery: No


Neurologic Surgery: No


Orthopedic Surgery: Yes (bilateral hip knee replacement)





- Immunization History


Immunization Up to Date: Yes





- Suicide/Smoking/Psychosocial Hx


Smoking History: Never smoked


Have you smoked in the past 12 months: No


Information on smoking cessation initiated: No


Hx Alcohol Use: No


Drug/Substance Use Hx: No


Substance Use Type: None


Hx Substance Use Treatment: No





<Heidi Alvarez - Last Filed: 04/01/18 19:11>





- Past Medical History


Allergies/Adverse Reactions: 


 Allergies











Allergy/AdvReac Type Severity Reaction Status Date / Time


 


No Known Allergies Allergy   Verified 03/30/18 11:07











Home Medications: 


Ambulatory Orders





Gabapentin [Neurontin -] 400 mg PO BID 06/09/14 


Sennosides [Senna -] 1 mg PO HS 06/09/14 


Simethicone [Gas Relief] 2 tab PO QID 06/09/14 


Acetaminophen [Tylenol] 650 mg PO TID 01/15/18 


Amlodipine Besylate [Norvasc -] 2.5 mg PO DAILY 01/15/18 


Ascorbate Calcium [Vitamin C] 500 mg PO DAILY 01/15/18 


Calcium Polycarbophil [Fiber Laxative] 625 mg PO HS 01/15/18 


Celecoxib [Celebrex] 100 mg PO BID 01/15/18 


Cholecalciferol (Vitamin D3) [Vitamin D3] 1,000 unit PO DAILY 01/15/18 


Cyanocobalamin (Vitamin B-12) [Vitamin B-12] 1,000 mcg PO DAILY 01/15/18 


Docusate Liquid [Colace Liquid -] 300 mg PO HS 01/15/18 


Multivitamins [Multivit ([facility] Formulary)] 1 tab PO DAILY 01/15/18 


Ondansetron [Zofran -] 4 mg PO TID 01/15/18 


Ranitidine HCl 150 mg PO DAILY 01/15/18 


Vit B Comp/C/Folic/Iron/Vit E [Vitamin B Complex Tablet] 1 each PO DAILY 01/15/

18 


Albuterol 2.5/Ipratropium 0.5 [Duoneb -] 1 amp NEB RQID PRN 10 Days  amp 01/27/ 18 


HYDROmorphone [Dilaudid -] 2 mg PO Q3H PRN #30 tablet MDD 6 01/27/18 


HYDROmorphone [Dilaudid -] 4 mg PO Q3H PRN #30 tablet MDD 6 01/27/18 


Lactobacillus Rhamnosus GG [Culturelle] 2 each PO DAILY 60 Days  capsule 01/27/ 18 


predniSONE [Deltasone -] See Taper PO DAILY 4 Days  tablet 01/27/18 


Mupirocin Ointment [Bactroban Ointment (For Decolonization) -] 1 applic TP BID 

02/06/18 











**Review of Systems





- Review of Systems


Able to Perform ROS?: Yes


Comments:: 


03/30/18 12:06


GENERAL/CONSTITUTIONAL: (+)Fever. No: chills, weakness, loss of appetite.


HEAD, EYES, EARS, NOSE AND THROAT: No: change in vision, ear pain, discharge, 

sore throat, throat swelling.


CARDIOVASCULAR: No: chest pain, lightheadedness, palpitations, syncope


RESPIRATORY: No: cough, shortness of breath, wheezing, hemoptysis, stridor.


GASTROINTESTINAL: No: nausea, vomiting, abdominal cramping, diarrhea, rectal 

bleeding, constipation. 


GENITOURINARY: No: dysuria, hematuria, frequency, urgency, flank pain.


MUSCULOSKELETAL: No: back pain, neck pain, joint pain, muscle swelling or pain


SKIN AND BREASTS: (+)Multiple ulcers right lower extremity. No: easy bruising.


NEUROLOGIC: No: headache, vertigo, paresthesias, weakness 


ENDOCRINE: No: unexplained weight gain or loss


HEMATOLOGIC/LYMPHATIC: No: anemia, easy bleeding, swelling nodes.





<Corina Mejia - Last Filed: 03/30/18 12:53>





*Physical Exam





- Vital Signs


 Last Vital Signs











Temp Pulse Resp BP Pulse Ox


 


 98.0 F   100 H  18   117/61   93 L


 


 03/30/18 11:07  03/30/18 11:07  03/30/18 11:07  03/30/18 11:07  03/30/18 11:07














- Physical Exam


Comments: 


03/30/18 12:08


GENERAL: The patient is in no acute distress.


HEAD: Normal with no signs of trauma.


EYES: PERRLA, EOMI, sclera anicteric, conjunctiva clear.


ENT: Ears normal, nares patent, oropharynx clear without exudates.  Moist 

mucous membranes.


NECK: Normal range of motion, supple without lymphadenopathy, JVD, or masses.


LUNGS: Breath sounds equal, clear to auscultation bilaterally.  No wheezes, and 

no crackles.


HEART:Regular rate and rhythm, normal S1 and S2 without murmur, rub or gallop.


ABDOMEN: Soft, nontender, normoactive bowel sounds.  No guarding, no rebound.


EXTREMITIES: (+)Seated in wheel chair, right leg internally rotated, downward 

going toes.


NEUROLOGICAL: Awake, alert, answers questions. Cranial nerves II through XII 

grossly intact.  Normal speech.  No focal 


neurological deficits. 


MUSCULOSKELETAL: Back non-tender to palpation, no CVA tenderness.


SKIN: (+)Please see note by Dr. Avila Hoyos because the right lower extremity 

is bandaged. Warm, Dry, normal turgor.








<Corina Mejia - Last Filed: 03/30/18 12:53>





- Vital Signs


 Last Vital Signs











Temp Pulse Resp BP Pulse Ox


 


 98.0 F   100 H  18   117/61   93 L


 


 03/30/18 11:07  03/30/18 11:07  03/30/18 11:07  03/30/18 11:07  03/30/18 11:07














<Heidi Alvarez - Last Filed: 04/01/18 19:11>





ED Treatment Course





- LABORATORY


CBC & Chemistry Diagram: 


 03/30/18 12:31





 03/30/18 12:31





<Corina Mejia - Last Filed: 03/30/18 12:53>





- LABORATORY


CBC & Chemistry Diagram: 


 04/01/18 06:45





 04/01/18 06:45





<Heidi Alvarez - Last Filed: 04/01/18 19:11>





Medical Decision Making





- Medical Decision Making





03/30/18 11:52


Ms Hancock is an 85 yo F with a history of multiple lower extremity ulcers, 

right osteomyelitis, on Vanco x 6 weeks


Pt being followed by Podiatry, ID and Wound clinic


Pt noted today to have worsening ulcers, visible bone, erythema, swelling, 

increased pain


? fevers


Pt sent to the ER for IV abx





Call placed to PMD


Will admit to his service


Call placed to Dr Wallace for abx recommendation


03/30/18 14:06


 Laboratory Tests











  03/30/18 03/30/18





  12:31 12:31


 


WBC  15.9 H 


 


Hgb  12.9  D 


 


Hct  39.4  D 


 


Plt Count  342 


 


Neutrophils %  85.5 H 


 


Lymphocytes %  7.1 L 


 


Sodium   135 L


 


Potassium   4.3


 


Chloride   100


 


Carbon Dioxide   28


 


Anion Gap   7 L


 


BUN   27 H


 


Creatinine   0.6


 


Random Glucose   100











03/30/18 14:06


Abx per ID (Case reviewed with Dr Wallace)





EKG: SR, rate of 81 bpm, LAD, no ST elevations or depressions, t wave up right





Admit to Med Surg


Clinical impression: lower extremity osteomyelitis





<Heidi Alvarez - Last Filed: 04/01/18 19:11>





*DC/Admit/Observation/Transfer





- Attestations


Scribe Attestion: 


03/30/18 12:16





Documentation prepared by Corina Mejia, acting as medical scribe for Heidi Alvarez MD.





<Corina Mejia - Last Filed: 03/30/18 12:53>





- Discharge Dispostion


Admit: Yes





<Heidi Alvarez - Last Filed: 04/01/18 19:11>


Diagnosis at time of Disposition: 


Cellulitis


Qualifiers:


 Site of cellulitis: extremity Site of cellulitis of extremity: lower extremity 

Laterality: right Qualified Code(s): L03.115 - Cellulitis of right lower limb





Osteomyelitis


Qualifiers:


 Osteomyelitis type: other Osteomyelitis location: other site Qualified Code(s)

: M86.8X8 - Other osteomyelitis, other site








- Discharge Dispostion


Condition at time of disposition: Stable

## 2018-03-30 NOTE — HP
Admitting History and Physical





- Primary Care Physician


PCP: Indra Andujar





- Admission


Chief Complaint: legs wounds and pains, fever


History of Present Illness: 





The patient is an 86-year-old female, with a significant past medical history 

of multiple pressure ulcers, chronic MRSA osteomyelitis s/p 6 weeks of 

vancomycin, who was seen and followed up at the wound center today by Dr. Avila Hoyos. The patient came with increasing pain, swelling, redness, and warmth in 

the right leg and was sent to the ER for admission to the hospital. She was 

noted to have a fever while at the wound center. Patient reports that she is 

eating and drinking normally. 


History Source: Patient, Medical Record


Limitations to Obtaining History: Clinical Condition





- Past Medical History


CNS: Yes: CVA


Cardiovascular: Yes: CAD


Musculoskeletal: Yes: Osteoarthritis (multiple sites, sever, on Dilaudid PO)


Dermatology: Yes: Other





- Smoking History


Smoking history: Never smoked


Have you smoked in the past 12 months: No





- Alcohol/Substance Use


Hx Alcohol Use: No


History of Substance Use: reports: None





- Social History


Usual Living Arrangement: Yes: Nursing Home


History of Recent Travel: No





Home Medications





- Allergies


Allergies/Adverse Reactions: 


 Allergies











Allergy/AdvReac Type Severity Reaction Status Date / Time


 


No Known Allergies Allergy   Verified 03/30/18 11:07














- Home Medications


Home Medications: 


Ambulatory Orders





Gabapentin [Neurontin -] 400 mg PO BID 06/09/14 


Sennosides [Senna -] 1 mg PO HS 06/09/14 


Simethicone [Gas Relief] 2 tab PO QID 06/09/14 


Acetaminophen [Tylenol] 650 mg PO TID 01/15/18 


Amlodipine Besylate [Norvasc -] 2.5 mg PO DAILY 01/15/18 


Ascorbate Calcium [Vitamin C] 500 mg PO DAILY 01/15/18 


Calcium Polycarbophil [Fiber Laxative] 625 mg PO HS 01/15/18 


Celecoxib [Celebrex] 100 mg PO BID 01/15/18 


Cholecalciferol (Vitamin D3) [Vitamin D3] 1,000 unit PO DAILY 01/15/18 


Cyanocobalamin (Vitamin B-12) [Vitamin B-12] 1,000 mcg PO DAILY 01/15/18 


Docusate Liquid [Colace Liquid -] 300 mg PO HS 01/15/18 


Multivitamins [Multivit (RH Formulary)] 1 tab PO DAILY 01/15/18 


Ondansetron [Zofran -] 4 mg PO TID 01/15/18 


Ranitidine HCl 150 mg PO DAILY 01/15/18 


Vit B Comp/C/Folic/Iron/Vit E [Vitamin B Complex Tablet] 1 each PO DAILY 01/15/

18 


Albuterol 2.5/Ipratropium 0.5 [Duoneb -] 1 amp NEB RQID PRN 10 Days  amp 01/27/ 18 


HYDROmorphone [Dilaudid -] 2 mg PO Q3H PRN #30 tablet MDD 6 01/27/18 


HYDROmorphone [Dilaudid -] 4 mg PO Q3H PRN #30 tablet MDD 6 01/27/18 


Lactobacillus Rhamnosus GG [Culturelle] 2 each PO DAILY 60 Days  capsule 01/27/ 18 


predniSONE [Deltasone -] See Taper PO DAILY 4 Days  tablet 01/27/18 


Mupirocin Ointment [Bactroban Ointment (For Decolonization) -] 1 applic TP BID 

02/06/18 











Family Disease History





- Family Disease History


Family History: Unremarkable





Review of Systems





- Review of Systems


Constitutional: reports: Fever, Loss of Appetite.  denies: Chills, Lethargy


Eyes: denies: Blind Spots, Double Vision


HENT: denies: Difficult Swallowing, Ear Pain


Neck: denies: Pain on Movement, Stiffness, Tenderness


Cardiovascular: denies: Chest Pain, Edema, Palpitations, Shortness of Breath


Respiratory: denies: Cough, SOB


Gastrointestinal: denies: Abdominal Pain, Bloating, Constipation, Diarrhea, 

Vomiting


Genitourinary: denies: Dysuria, Flank Pain


Musculoskeletal: reports: Joint Pain (R ankle > L ankle).  denies: Back Pain


Integumentary: reports: Rash, Wound (both legs around ankles; R chronic ankle 

dislocation/ deformity)


Neurological: denies: Change in LOC, Change in Speech, Confusion, Dizziness, 

Seizure, Syncope


Hematology/Lymphatic: denies: Easily Bruised, Excessive Bleeding


Psychiatric: denies: Anxiety, Depression





Physical Examination


Vital Signs: 


 Vital Signs











Temperature  99.2 F   03/30/18 14:16


 


Pulse Rate  89   03/30/18 14:16


 


Respiratory Rate  16   03/30/18 14:16


 


Blood Pressure  115/71   03/30/18 14:16


 


O2 Sat by Pulse Oximetry (%)  97   03/30/18 14:16











Constitutional: Yes: No Distress, Calm


Eyes: Yes: Conjunctiva Clear


HENT: Yes: Atraumatic


Neck: Yes: Supple


Cardiovascular: Yes: Regular Rate and Rhythm


Respiratory: Yes: CTA Bilaterally


Gastrointestinal: Yes: Soft.  No: Distention, Tenderness


Renal/: No: CVA Tenderness - Left, CVA Tenderness - Right, Hematuria


Musculoskeletal: No: Joint Stiffness, Joint Swelling


Extremities: Yes: Deformity (R ankle chronic).  No: Calf Tenderness, Cold, Cool


Edema: No


Integumentary: Yes: Pressure Ulcer, Rash, Venous Stasis Changes


Wound/Incision: Yes: Open to air (both legs R>L around ankles), Reddened


Neurological: Yes: WNL, Alert, Oriented, Other (pt is chronically bed bound and 

nonambulatory for long time)


...Motor Strength: WNL


Psychiatric: Yes: WNL, Alert, Oriented.  No: Agitated, Suicidal Ideation


Labs: 


 CBC, BMP





 03/30/18 12:31 





 03/30/18 12:31 











Imaging





- Results


Chest X-ray: Report Reviewed


Other: Report Reviewed





Assessment/Plan





The patient is an 86-year-old female, with a significant past medical history 

of multiple pressure ulcers, chronic MRSA osteomyelitis s/p 6 weeks of 

vancomycin, both legs wound, admitted with worsening R leg wound and fever


vascular sx, podiatry and ID eval, r/o cellulitis vs recurrent osteomyelitis 


IV ATB per ID


DVT pfx


pain meds prn


to consider amputation? given chronic wound, foot deformity and pt's 

nonambulatory condition for long time


d/w ID


d/w pt and staff

## 2018-03-30 NOTE — PN
Progress Note (short form)





- Note


Progress Note: 





ID  consult dictated





imp/reccd





85 yo NHR admitted with recurrent left ankle ulcer with erythema


nonambulatory





recently completed vancomycin for osteomyelitis at the NH





history of MRSA





needs podiatry evaluation





esr/crp





xrays





vancomycin


isolation





d/w Dr Andujar











Problem List





- Problems


(1) Cellulitis


Code(s): L03.90 - CELLULITIS, UNSPECIFIED   


Qualifiers: 


   Site of cellulitis: extremity   Site of cellulitis of extremity: lower 

extremity   Laterality: right   Qualified Code(s): L03.115 - Cellulitis of 

right lower limb   





(2) Osteomyelitis


Code(s): M86.9 - OSTEOMYELITIS, UNSPECIFIED   


Qualifiers: 


   Osteomyelitis type: other   Osteomyelitis location: other site   Qualified 

Code(s): M86.8X8 - Other osteomyelitis, other site

## 2018-03-31 LAB
ALBUMIN SERPL-MCNC: 2.2 G/DL (ref 3.4–5)
ALP SERPL-CCNC: 176 U/L (ref 45–117)
ALT SERPL-CCNC: < 6 U/L (ref 12–78)
ANION GAP SERPL CALC-SCNC: 10 MMOL/L (ref 8–16)
AST SERPL-CCNC: 13 U/L (ref 15–37)
BASOPHILS # BLD: 0.4 % (ref 0–2)
BILIRUB SERPL-MCNC: 0.4 MG/DL (ref 0.2–1)
BUN SERPL-MCNC: 17 MG/DL (ref 7–18)
CALCIUM SERPL-MCNC: 8.6 MG/DL (ref 8.5–10.1)
CHLORIDE SERPL-SCNC: 99 MMOL/L (ref 98–107)
CO2 SERPL-SCNC: 29 MMOL/L (ref 21–32)
CREAT SERPL-MCNC: 0.5 MG/DL (ref 0.55–1.02)
DEPRECATED RDW RBC AUTO: 13.3 % (ref 11.6–15.6)
EOSINOPHIL # BLD: 0.8 % (ref 0–4.5)
GLUCOSE SERPL-MCNC: 99 MG/DL (ref 74–106)
HCT VFR BLD CALC: 35.4 % (ref 32.4–45.2)
HGB BLD-MCNC: 11.9 GM/DL (ref 10.7–15.3)
LYMPHOCYTES # BLD: 7.5 % (ref 8–40)
MCH RBC QN AUTO: 33.3 PG (ref 25.7–33.7)
MCHC RBC AUTO-ENTMCNC: 33.7 G/DL (ref 32–36)
MCV RBC: 99 FL (ref 80–96)
MONOCYTES # BLD AUTO: 9.2 % (ref 3.8–10.2)
NEUTROPHILS # BLD: 82.1 % (ref 42.8–82.8)
PLATELET # BLD AUTO: 269 K/MM3 (ref 134–434)
PMV BLD: 8.7 FL (ref 7.5–11.1)
POTASSIUM SERPLBLD-SCNC: 3.9 MMOL/L (ref 3.5–5.1)
PROT SERPL-MCNC: 5.8 G/DL (ref 6.4–8.2)
RBC # BLD AUTO: 3.58 M/MM3 (ref 3.6–5.2)
SODIUM SERPL-SCNC: 138 MMOL/L (ref 136–145)
WBC # BLD AUTO: 12.1 K/MM3 (ref 4–10)

## 2018-03-31 RX ADMIN — DOCUSATE SODIUM LIQUID SCH MG: 100 LIQUID ORAL at 22:09

## 2018-03-31 RX ADMIN — RANITIDINE SCH MG: 150 TABLET ORAL at 10:49

## 2018-03-31 RX ADMIN — AMLODIPINE BESYLATE SCH MG: 2.5 TABLET ORAL at 10:50

## 2018-03-31 RX ADMIN — ACETAMINOPHEN PRN MG: 325 TABLET ORAL at 17:26

## 2018-03-31 RX ADMIN — GABAPENTIN SCH MG: 400 CAPSULE ORAL at 10:49

## 2018-03-31 RX ADMIN — SENNOSIDES SCH TAB: 8.6 TABLET, FILM COATED ORAL at 22:09

## 2018-03-31 RX ADMIN — VANCOMYCIN HYDROCHLORIDE SCH MLS/HR: 1 INJECTION, POWDER, LYOPHILIZED, FOR SOLUTION INTRAVENOUS at 17:28

## 2018-03-31 RX ADMIN — CYANOCOBALAMIN TAB 1000 MCG SCH MCG: 1000 TAB at 10:50

## 2018-03-31 RX ADMIN — MULTIVITAMIN TABLET SCH TAB: TABLET at 10:49

## 2018-03-31 RX ADMIN — VITAMIN D, TAB 1000IU (100/BT) SCH UNIT: 25 TAB at 10:49

## 2018-03-31 RX ADMIN — GABAPENTIN SCH MG: 400 CAPSULE ORAL at 22:09

## 2018-03-31 RX ADMIN — HEPARIN SODIUM SCH UNIT: 5000 INJECTION, SOLUTION INTRAVENOUS; SUBCUTANEOUS at 10:49

## 2018-03-31 RX ADMIN — HEPARIN SODIUM SCH UNIT: 5000 INJECTION, SOLUTION INTRAVENOUS; SUBCUTANEOUS at 22:09

## 2018-03-31 NOTE — CONSULT
Consult - text type





- Consultation


Consultation Note: 





Podiatry Consult:





86 year old F well known to me from wound healing center presents for admission 

with RLE cellulitis.  Patient is s/p R foot debridement of ulcer with bone 

biopsy for MRSA osteomyelitis.  Treated with course of IV abx for treatment of 

osteomyelitis.  Currently afebrile, however spiked a fever of 103 F while 

admitted.  Patient was placed in posterior splint after surgery, which helped 

condition.  





PMHx: multiple pressure ulcers, osteoarthritis, osteomyelitis


Meds: noted


ALL: NKMA





MONY:





R foot: dorsal rearfoot pressure ulcer stage 4 with exposed talar body, no 

purulent drainage, no fluctuance, no soft tissue crepitus.  Unstageable 

pressure ulcer distal with exposed extensor tendon, no purulence, no fluctuance

, no soft tissue crepitus.  There is moderate erythema to the rearfoot and 

lower leg.  There is significant tenderness to palpation.





WBC: 12.1


ESR: 80





Blood Cx: no growth x 24 hrs





R foot XR: severe contracture ankle joint, no evidence of osteomyelitis





Imp: 86 year old F with R foot pressure ulcer stage 4, history of osteomyelitis


1. IV abx per ID for cellulitis


2. Local wound care


3. I have discussed with patient and other team members in the past.  She has 

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  Without addressing 

underlying structural issue she has a high rate of recurrence of pressure 

ulcer.  She needs splinting and bracing as well to assist in dorsiflexing the 

ankle.  Prognosis guarded considering current clinical appearance.


4. Thank you for the courtesy of this consultation.





RAVEN Villa DPM

## 2018-03-31 NOTE — PN
Progress Note, Physician


History of Present Illness: 





Awake, alert


No c/o foot pain


Temp 103 noted


WBC improved





- Current Medication List


Current Medications: 


Active Medications





Acetaminophen (Tylenol -)  650 mg PO TID PRN


   PRN Reason: PAIN


   Last Admin: 03/30/18 23:10 Dose:  650 mg


Albuterol/Ipratropium (Duoneb -)  1 amp NEB Q6H PRN


   PRN Reason: Dyspnea


Amlodipine Besylate (Norvasc -)  2.5 mg PO DAILY [facility]


   Last Admin: 03/31/18 10:50 Dose:  2.5 mg


Cholecalciferol (Vitamin D3 -)  1,000 unit PO DAILY [facility]


   Last Admin: 03/31/18 10:49 Dose:  1,000 unit


Cyanocobalamin (Vitamin B12 -)  1,000 mcg PO DAILY [facility]


   Last Admin: 03/31/18 10:50 Dose:  1,000 mcg


Docusate Sodium (Colace Liquid -)  300 mg PO HS [facility]


   Last Admin: 03/30/18 23:10 Dose:  300 mg


Gabapentin (Neurontin -)  400 mg PO BID [facility]


   Last Admin: 03/31/18 10:49 Dose:  400 mg


Heparin Sodium (Porcine) (Heparin -)  5,000 unit SQ BID [facility]


   Last Admin: 03/31/18 10:49 Dose:  5,000 unit


Hydromorphone HCl (Dilaudid -)  2 mg PO Q3H PRN


   PRN Reason: PAIN LEVEL 6-10


   Last Admin: 03/31/18 10:49 Dose:  2 mg


Vancomycin HCl 1,000 mg/ (Dextrose)  250 mls @ 250 mls/hr IVPB DAILY@1700 [facility]


   Last Admin: 03/30/18 17:44 Dose:  250 mls/hr


Multivitamins/Minerals/Vitamin C (Tab-A-Vit -)  1 tab PO DAILY [facility]


   Last Admin: 03/31/18 10:49 Dose:  1 tab


Ondansetron HCl (Zofran -)  4 mg PO TID PRN


   PRN Reason: NAUSEA


Ranitidine HCl (Zantac -)  150 mg PO DAILY [facility]


   Last Admin: 03/31/18 10:49 Dose:  150 mg


Senna (Senna -)  1 tab PO [facility]


   Last Admin: 03/30/18 23:09 Dose:  1 tab











- Objective


Vital Signs: 


 Vital Signs











Temperature  98.5 F   03/31/18 11:36


 


Pulse Rate  79   03/31/18 11:36


 


Respiratory Rate  18   03/31/18 11:36


 


Blood Pressure  118/75   03/31/18 11:36


 


O2 Sat by Pulse Oximetry (%)  97   03/30/18 21:00











Constitutional: Yes: No Distress


Eyes: Yes: Conjunctiva Clear


Cardiovascular: Yes: Regular Rate and Rhythm, S1, S2


Respiratory: Yes: CTA Bilaterally


Gastrointestinal: Yes: Normal Bowel Sounds, Soft.  No: Tenderness


Extremities: Yes: Other (+ erythema/ warmth R foot   + dry ulcer)


Labs: 


 CBC, BMP





 03/31/18 07:00 





 03/31/18 07:00 











Assessment/Plan





Cellulitis R foot


Hx MRSA


Await c/s 


Continue vancomycin

## 2018-03-31 NOTE — PN
Progress Note, Physician


Chief Complaint: 





in bed awake alert NAD no new c/o feeling the same as yesterday


no fever now





- Current Medication List


Current Medications: 


Active Medications





Acetaminophen (Tylenol -)  650 mg PO TID PRN


   PRN Reason: PAIN


   Last Admin: 03/30/18 23:10 Dose:  650 mg


Albuterol/Ipratropium (Duoneb -)  1 amp NEB Q6H PRN


   PRN Reason: Dyspnea


Amlodipine Besylate (Norvasc -)  2.5 mg PO DAILY [facility]


Cholecalciferol (Vitamin D3 -)  1,000 unit PO DAILY [facility]


Cyanocobalamin (Vitamin B12 -)  1,000 mcg PO DAILY [facility]


Docusate Sodium (Colace Liquid -)  300 mg PO HS [facility]


   Last Admin: 03/30/18 23:10 Dose:  300 mg


Gabapentin (Neurontin -)  400 mg PO BID [facility]


   Last Admin: 03/30/18 23:09 Dose:  400 mg


Heparin Sodium (Porcine) (Heparin -)  5,000 unit SQ BID [facility]


   Last Admin: 03/30/18 23:10 Dose:  5,000 unit


Hydromorphone HCl (Dilaudid -)  2 mg PO Q3H PRN


   PRN Reason: PAIN LEVEL 6-10


   Last Admin: 03/31/18 05:35 Dose:  2 mg


Vancomycin HCl 1,000 mg/ (Dextrose)  250 mls @ 250 mls/hr IVPB DAILY@1700 [facility]


   Last Admin: 03/30/18 17:44 Dose:  250 mls/hr


Multivitamins/Minerals/Vitamin C (Tab-A-Vit -)  1 tab PO DAILY [facility]


Ondansetron HCl (Zofran -)  4 mg PO TID PRN


   PRN Reason: NAUSEA


Ranitidine HCl (Zantac -)  150 mg PO DAILY [facility]


Senna (Senna -)  1 tab PO [facility]


   Last Admin: 03/30/18 23:09 Dose:  1 tab











- Objective


Vital Signs: 


 Vital Signs











Temperature  98 F   03/31/18 06:15


 


Pulse Rate  90   03/31/18 06:15


 


Respiratory Rate  20   03/31/18 06:15


 


Blood Pressure  154/89   03/31/18 06:15


 


O2 Sat by Pulse Oximetry (%)  97   03/30/18 21:00











Constitutional: Yes: No Distress, Calm


Eyes: Yes: Conjunctiva Clear


HENT: Yes: Atraumatic


Neck: Yes: Supple


Cardiovascular: Yes: Regular Rate and Rhythm


Respiratory: Yes: CTA Bilaterally


Gastrointestinal: Yes: Soft.  No: Distention


Genitourinary: No: CVA Tenderness - Left, CVA Tenderness - Right


Musculoskeletal: Yes: Other (R ankle wound dressed no bleed; L ankle dressed no 

bleed)


Edema: No


Integumentary: Yes: Pressure Ulcer, Venous Stasis Changes


Neurological: Yes: WNL, Alert, Oriented


...Motor Strength: WNL


Psychiatric: Yes: WNL, Alert, Oriented.  No: Agitated, Suicidal Ideation


Labs: 


 CBC, BMP





 03/30/18 12:31 





 03/30/18 12:31 











- ....Imaging


Other: Report Reviewed





Assessment/Plan





The patient is an 86-year-old female, with a significant past medical history 

of multiple pressure ulcers, chronic MRSA osteomyelitis s/p 6 weeks of 

vancomycin, both legs wound, admitted with worsening R leg wound and fever


vascular sx, podiatry and ID eval, r/o cellulitis vs recurrent osteomyelitis 


IV ATB per ID


DVT pfx


pain meds prn


to consider amputation? given chronic wound, foot deformity and pt's 

nonambulatory condition for long time


d/w ID


d/w pt and staff


DNR DNI per pt's wishes

## 2018-04-01 LAB
ANION GAP SERPL CALC-SCNC: 8 MMOL/L (ref 8–16)
BASOPHILS # BLD: 0.5 % (ref 0–2)
BUN SERPL-MCNC: 10 MG/DL (ref 7–18)
CALCIUM SERPL-MCNC: 8.5 MG/DL (ref 8.5–10.1)
CHLORIDE SERPL-SCNC: 99 MMOL/L (ref 98–107)
CO2 SERPL-SCNC: 28 MMOL/L (ref 21–32)
CREAT SERPL-MCNC: 0.3 MG/DL (ref 0.55–1.02)
DEPRECATED RDW RBC AUTO: 13.5 % (ref 11.6–15.6)
EOSINOPHIL # BLD: 1 % (ref 0–4.5)
GLUCOSE SERPL-MCNC: 101 MG/DL (ref 74–106)
HCT VFR BLD CALC: 34.9 % (ref 32.4–45.2)
HGB BLD-MCNC: 11.7 GM/DL (ref 10.7–15.3)
LYMPHOCYTES # BLD: 9.9 % (ref 8–40)
MCH RBC QN AUTO: 33 PG (ref 25.7–33.7)
MCHC RBC AUTO-ENTMCNC: 33.4 G/DL (ref 32–36)
MCV RBC: 98.7 FL (ref 80–96)
MONOCYTES # BLD AUTO: 8.7 % (ref 3.8–10.2)
NEUTROPHILS # BLD: 79.9 % (ref 42.8–82.8)
PLATELET # BLD AUTO: 304 K/MM3 (ref 134–434)
PMV BLD: 8.4 FL (ref 7.5–11.1)
POTASSIUM SERPLBLD-SCNC: 3.6 MMOL/L (ref 3.5–5.1)
RBC # BLD AUTO: 3.54 M/MM3 (ref 3.6–5.2)
SODIUM SERPL-SCNC: 135 MMOL/L (ref 136–145)
WBC # BLD AUTO: 10.1 K/MM3 (ref 4–10)

## 2018-04-01 RX ADMIN — HEPARIN SODIUM SCH UNIT: 5000 INJECTION, SOLUTION INTRAVENOUS; SUBCUTANEOUS at 21:52

## 2018-04-01 RX ADMIN — GABAPENTIN SCH MG: 400 CAPSULE ORAL at 09:14

## 2018-04-01 RX ADMIN — CYANOCOBALAMIN TAB 1000 MCG SCH MCG: 1000 TAB at 09:15

## 2018-04-01 RX ADMIN — SENNOSIDES SCH: 8.6 TABLET, FILM COATED ORAL at 21:51

## 2018-04-01 RX ADMIN — RANITIDINE SCH MG: 150 TABLET ORAL at 09:14

## 2018-04-01 RX ADMIN — VITAMIN D, TAB 1000IU (100/BT) SCH UNIT: 25 TAB at 09:14

## 2018-04-01 RX ADMIN — AMLODIPINE BESYLATE SCH MG: 2.5 TABLET ORAL at 09:14

## 2018-04-01 RX ADMIN — HEPARIN SODIUM SCH UNIT: 5000 INJECTION, SOLUTION INTRAVENOUS; SUBCUTANEOUS at 14:56

## 2018-04-01 RX ADMIN — ACETAMINOPHEN PRN MG: 325 TABLET ORAL at 09:14

## 2018-04-01 RX ADMIN — VANCOMYCIN HYDROCHLORIDE SCH MLS/HR: 1 INJECTION, POWDER, LYOPHILIZED, FOR SOLUTION INTRAVENOUS at 20:22

## 2018-04-01 RX ADMIN — GABAPENTIN SCH MG: 400 CAPSULE ORAL at 21:51

## 2018-04-01 RX ADMIN — MULTIVITAMIN TABLET SCH TAB: TABLET at 09:14

## 2018-04-01 RX ADMIN — DOCUSATE SODIUM LIQUID SCH: 100 LIQUID ORAL at 21:51

## 2018-04-01 NOTE — PN
Progress Note, Physician


History of Present Illness: 





Awake, alert


Less  foot pain


Low grade temp


WBC improved





- Current Medication List


Current Medications: 


Active Medications





Acetaminophen (Tylenol -)  650 mg PO TID PRN


   PRN Reason: PAIN


   Last Admin: 04/01/18 09:14 Dose:  650 mg


Albuterol/Ipratropium (Duoneb -)  1 amp NEB Q6H PRN


   PRN Reason: Dyspnea


Amlodipine Besylate (Norvasc -)  2.5 mg PO DAILY [facility]


   Last Admin: 04/01/18 09:14 Dose:  2.5 mg


Cholecalciferol (Vitamin D3 -)  1,000 unit PO DAILY [facility]


   Last Admin: 04/01/18 09:14 Dose:  1,000 unit


Cyanocobalamin (Vitamin B12 -)  1,000 mcg PO DAILY [facility]


   Last Admin: 04/01/18 09:15 Dose:  1,000 mcg


Docusate Sodium (Colace Liquid -)  300 mg PO HS [facility]


   Last Admin: 03/31/18 22:09 Dose:  300 mg


Gabapentin (Neurontin -)  400 mg PO BID [facility]


   Last Admin: 04/01/18 09:14 Dose:  400 mg


Heparin Sodium (Porcine) (Heparin -)  5,000 unit SQ BID [facility]


   Last Admin: 03/31/18 22:09 Dose:  5,000 unit


Hydromorphone HCl (Dilaudid -)  2 mg PO Q3H PRN


   PRN Reason: PAIN LEVEL 6-10


   Last Admin: 04/01/18 10:09 Dose:  2 mg


Vancomycin HCl 1,000 mg/ (Dextrose)  250 mls @ 250 mls/hr IVPB DAILY@1700 [facility]


   Last Admin: 03/31/18 17:28 Dose:  250 mls/hr


Multivitamins/Minerals/Vitamin C (Tab-A-Vit -)  1 tab PO DAILY [facility]


   Last Admin: 04/01/18 09:14 Dose:  1 tab


Ondansetron HCl (Zofran -)  4 mg PO TID PRN


   PRN Reason: NAUSEA


Ranitidine HCl (Zantac -)  150 mg PO DAILY [facility]


   Last Admin: 04/01/18 09:14 Dose:  150 mg


Senna (Senna -)  1 tab PO HS [facility]


   Last Admin: 03/31/18 22:09 Dose:  1 tab











- Objective


Vital Signs: 


 Vital Signs











Temperature  99 F   04/01/18 05:50


 


Pulse Rate  81   04/01/18 05:50


 


Respiratory Rate  18   04/01/18 05:50


 


Blood Pressure  118/70   04/01/18 05:50


 


O2 Sat by Pulse Oximetry (%)  98   03/31/18 21:00











Constitutional: Yes: No Distress, Thin


Eyes: Yes: Conjunctiva Clear


Cardiovascular: Yes: Regular Rate and Rhythm, S1, S2


Respiratory: Yes: CTA Bilaterally


Gastrointestinal: Yes: Normal Bowel Sounds, Soft.  No: Tenderness


Extremities: Yes: Other (decreased R foot erythema/warmth)


Labs: 


 CBC, BMP





 04/01/18 06:45 





 04/01/18 06:45 











Assessment/Plan





Cellulitis R foot


Hx MRSA


Await c/s 


Continue vancomycin


Check  vancomycin level

## 2018-04-01 NOTE — PN
Progress Note, Physician


Chief Complaint: 





slighlty better less pain,a febrile 


consults reviewed and d/w pt





- Current Medication List


Current Medications: 


Active Medications





Acetaminophen (Tylenol -)  650 mg PO TID PRN


   PRN Reason: PAIN


   Last Admin: 03/31/18 17:26 Dose:  650 mg


Albuterol/Ipratropium (Duoneb -)  1 amp NEB Q6H PRN


   PRN Reason: Dyspnea


Amlodipine Besylate (Norvasc -)  2.5 mg PO DAILY [facility]


   Last Admin: 03/31/18 10:50 Dose:  2.5 mg


Cholecalciferol (Vitamin D3 -)  1,000 unit PO DAILY [facility]


   Last Admin: 03/31/18 10:49 Dose:  1,000 unit


Cyanocobalamin (Vitamin B12 -)  1,000 mcg PO DAILY [facility]


   Last Admin: 03/31/18 10:50 Dose:  1,000 mcg


Docusate Sodium (Colace Liquid -)  300 mg PO HS [facility]


   Last Admin: 03/31/18 22:09 Dose:  300 mg


Gabapentin (Neurontin -)  400 mg PO BID [facility]


   Last Admin: 03/31/18 22:09 Dose:  400 mg


Heparin Sodium (Porcine) (Heparin -)  5,000 unit SQ BID [facility]


   Last Admin: 03/31/18 22:09 Dose:  5,000 unit


Hydromorphone HCl (Dilaudid -)  2 mg PO Q3H PRN


   PRN Reason: PAIN LEVEL 6-10


   Last Admin: 04/01/18 06:03 Dose:  2 mg


Vancomycin HCl 1,000 mg/ (Dextrose)  250 mls @ 250 mls/hr IVPB DAILY@1700 [facility]


   Last Admin: 03/31/18 17:28 Dose:  250 mls/hr


Multivitamins/Minerals/Vitamin C (Tab-A-Vit -)  1 tab PO DAILY [facility]


   Last Admin: 03/31/18 10:49 Dose:  1 tab


Ondansetron HCl (Zofran -)  4 mg PO TID PRN


   PRN Reason: NAUSEA


Ranitidine HCl (Zantac -)  150 mg PO DAILY [facility]


   Last Admin: 03/31/18 10:49 Dose:  150 mg


Senna (Senna -)  1 tab PO [facility]


   Last Admin: 03/31/18 22:09 Dose:  1 tab











- Objective


Vital Signs: 


 Vital Signs











Temperature  99 F   04/01/18 05:50


 


Pulse Rate  81   04/01/18 05:50


 


Respiratory Rate  18   04/01/18 05:50


 


Blood Pressure  118/70   04/01/18 05:50


 


O2 Sat by Pulse Oximetry (%)  98   03/31/18 21:00











Constitutional: Yes: No Distress, Calm


Eyes: Yes: Conjunctiva Clear


HENT: Yes: Atraumatic


Neck: Yes: Supple


Cardiovascular: Yes: Regular Rate and Rhythm


Respiratory: Yes: CTA Bilaterally


Gastrointestinal: Yes: Soft.  No: Distention


Genitourinary: No: CVA Tenderness - Left, CVA Tenderness - Right, Hematuria


Musculoskeletal: Yes: Other (ankles wounds R>L dressed no bleed)


Extremities: No: Cold, Cool


Edema: No


Integumentary: Yes: Pressure Ulcer, Rash, Venous Stasis Changes


Neurological: Yes: WNL, Alert, Oriented


...Motor Strength: WNL


Psychiatric: Yes: WNL, Alert, Oriented.  No: Agitated, Suicidal Ideation


Labs: 


 CBC, BMP





 03/31/18 07:00 





 03/31/18 07:00 











- ....Imaging


Other: Report Reviewed





Assessment/Plan





The patient is an 86-year-old female, with a significant past medical history 

of multiple pressure ulcers, chronic MRSA osteomyelitis s/p 6 weeks of 

vancomycin, both legs wound, admitted with worsening R leg wound and fever


vascular sx, podiatry and ID f/u r/o cellulitis vs recurrent osteomyelitis


ortho eval


IV ATB per ID


DVT pfx


pain meds prn


d/w ID


d/w pt and staff


DNR DNI per pt's wishes

## 2018-04-02 LAB
ANION GAP SERPL CALC-SCNC: 10 MMOL/L (ref 8–16)
BASOPHILS # BLD: 0.5 % (ref 0–2)
BUN SERPL-MCNC: 10 MG/DL (ref 7–18)
CALCIUM SERPL-MCNC: 8.4 MG/DL (ref 8.5–10.1)
CHLORIDE SERPL-SCNC: 98 MMOL/L (ref 98–107)
CO2 SERPL-SCNC: 27 MMOL/L (ref 21–32)
CREAT SERPL-MCNC: 0.3 MG/DL (ref 0.55–1.02)
DEPRECATED RDW RBC AUTO: 13.4 % (ref 11.6–15.6)
EOSINOPHIL # BLD: 1.5 % (ref 0–4.5)
GLUCOSE SERPL-MCNC: 87 MG/DL (ref 74–106)
HCT VFR BLD CALC: 36.3 % (ref 32.4–45.2)
HGB BLD-MCNC: 12.1 GM/DL (ref 10.7–15.3)
LYMPHOCYTES # BLD: 13.2 % (ref 8–40)
MCH RBC QN AUTO: 33 PG (ref 25.7–33.7)
MCHC RBC AUTO-ENTMCNC: 33.4 G/DL (ref 32–36)
MCV RBC: 99 FL (ref 80–96)
MONOCYTES # BLD AUTO: 10 % (ref 3.8–10.2)
NEUTROPHILS # BLD: 74.8 % (ref 42.8–82.8)
PLATELET # BLD AUTO: 322 K/MM3 (ref 134–434)
PMV BLD: 8.3 FL (ref 7.5–11.1)
POTASSIUM SERPLBLD-SCNC: 4.2 MMOL/L (ref 3.5–5.1)
RBC # BLD AUTO: 3.67 M/MM3 (ref 3.6–5.2)
SODIUM SERPL-SCNC: 135 MMOL/L (ref 136–145)
WBC # BLD AUTO: 9 K/MM3 (ref 4–10)

## 2018-04-02 RX ADMIN — DOCUSATE SODIUM LIQUID SCH: 100 LIQUID ORAL at 21:34

## 2018-04-02 RX ADMIN — CYANOCOBALAMIN TAB 1000 MCG SCH MCG: 1000 TAB at 09:13

## 2018-04-02 RX ADMIN — HEPARIN SODIUM SCH UNIT: 5000 INJECTION, SOLUTION INTRAVENOUS; SUBCUTANEOUS at 09:14

## 2018-04-02 RX ADMIN — MULTIVITAMIN TABLET SCH TAB: TABLET at 09:13

## 2018-04-02 RX ADMIN — HEPARIN SODIUM SCH UNIT: 5000 INJECTION, SOLUTION INTRAVENOUS; SUBCUTANEOUS at 21:34

## 2018-04-02 RX ADMIN — GABAPENTIN SCH MG: 400 CAPSULE ORAL at 21:33

## 2018-04-02 RX ADMIN — VITAMIN D, TAB 1000IU (100/BT) SCH UNIT: 25 TAB at 09:13

## 2018-04-02 RX ADMIN — AMLODIPINE BESYLATE SCH MG: 2.5 TABLET ORAL at 09:13

## 2018-04-02 RX ADMIN — RANITIDINE SCH MG: 150 TABLET ORAL at 09:13

## 2018-04-02 RX ADMIN — GABAPENTIN SCH MG: 400 CAPSULE ORAL at 09:13

## 2018-04-02 RX ADMIN — VANCOMYCIN HYDROCHLORIDE SCH MLS/HR: 1 INJECTION, POWDER, LYOPHILIZED, FOR SOLUTION INTRAVENOUS at 18:20

## 2018-04-02 RX ADMIN — ACETAMINOPHEN PRN MG: 325 TABLET ORAL at 21:32

## 2018-04-02 RX ADMIN — SENNOSIDES SCH: 8.6 TABLET, FILM COATED ORAL at 21:34

## 2018-04-02 NOTE — EKG
Test Reason : 

Blood Pressure : ***/*** mmHG

Vent. Rate : 081 BPM     Atrial Rate : 081 BPM

   P-R Int : 138 ms          QRS Dur : 086 ms

    QT Int : 364 ms       P-R-T Axes : 029 -31 029 degrees

   QTc Int : 422 ms

 

NORMAL SINUS RHYTHM

LEFT AXIS DEVIATION

MINIMAL VOLTAGE CRITERIA FOR LVH, MAY BE NORMAL VARIANT

ANTERIOR INFARCT (CITED ON OR BEFORE 15-RAMON-2018)

ABNORMAL ECG

WHEN COMPARED WITH ECG OF 15-RAMON-2018 16:33,

NO SIGNIFICANT CHANGE WAS FOUND

Confirmed by ALVINO CALDERON MD ([id number]) on 4/2/2018 11:39:52 AM

 

Referred By:             Confirmed By:ALVINO CALDERON MD

## 2018-04-02 NOTE — PN
Progress Note, Physician


History of Present Illness: 





Awake, alert


C/O  bilateral LE pain with movement


No c/o fever/ chills


Afebrile WBC WNL


BC (-)  Vancomycin trough 12.1


 





- Current Medication List


Current Medications: 


Active Medications





Acetaminophen (Tylenol -)  650 mg PO TID PRN


   PRN Reason: PAIN


   Last Admin: 04/01/18 09:14 Dose:  650 mg


Albuterol/Ipratropium (Duoneb -)  1 amp NEB Q6H PRN


   PRN Reason: Dyspnea


Amlodipine Besylate (Norvasc -)  2.5 mg PO DAILY [facility]


   Last Admin: 04/02/18 09:13 Dose:  2.5 mg


Cholecalciferol (Vitamin D3 -)  1,000 unit PO DAILY [facility]


   Last Admin: 04/02/18 09:13 Dose:  1,000 unit


Cyanocobalamin (Vitamin B12 -)  1,000 mcg PO DAILY [facility]


   Last Admin: 04/02/18 09:13 Dose:  1,000 mcg


Docusate Sodium (Colace Liquid -)  300 mg PO HS [facility]


   Last Admin: 04/01/18 21:51 Dose:  Not Given


Gabapentin (Neurontin -)  400 mg PO BID [facility]


   Last Admin: 04/02/18 09:13 Dose:  400 mg


Heparin Sodium (Porcine) (Heparin -)  5,000 unit SQ BID [facility]


   Last Admin: 04/02/18 09:14 Dose:  5,000 unit


Hydromorphone HCl (Dilaudid -)  6 mg PO Q3H [facility]


Vancomycin HCl 1,000 mg/ (Dextrose)  250 mls @ 250 mls/hr IVPB DAILY@1700 [facility]


   Last Admin: 04/01/18 20:22 Dose:  250 mls/hr


Multivitamins/Minerals/Vitamin C (Tab-A-Vit -)  1 tab PO DAILY [facility]


   Last Admin: 04/02/18 09:13 Dose:  1 tab


Ondansetron HCl (Zofran -)  4 mg PO TID PRN


   PRN Reason: NAUSEA


Ranitidine HCl (Zantac -)  150 mg PO DAILY [facility]


   Last Admin: 04/02/18 09:13 Dose:  150 mg


Senna (Senna -)  1 tab PO HS [facility]


   Last Admin: 04/01/18 21:51 Dose:  Not Given











- Objective


Vital Signs: 


 Vital Signs











Temperature  99.3 F   04/02/18 06:09


 


Pulse Rate  97 H  04/02/18 09:00


 


Respiratory Rate  22   04/02/18 09:00


 


Blood Pressure  133/71   04/02/18 09:00


 


O2 Sat by Pulse Oximetry (%)  95   04/02/18 09:00











Constitutional: Yes: No Distress, Thin


Cardiovascular: Yes: Regular Rate and Rhythm, S1, S2


Respiratory: Yes: CTA Bilaterally


Gastrointestinal: Yes: Normal Bowel Sounds, Soft.  No: Tenderness


Extremities: Yes: Other (decreased erythema distal R LE/ foot + dry ulcer 

dorsum R foot + dry ulcer with erythema distal L LE)


Labs: 


 CBC, BMP





 04/02/18 06:30 





 04/02/18 06:30 











Assessment/Plan





Cellulitis R foot/ non-healing ulcer


L leg ulcer/ cellulitis


Hx MRSA


 


Continue vancomycin


 Local wound care

## 2018-04-02 NOTE — PN
Progress Note, Physician


History of Present Illness: 





Pt with right ankle pain better since pain medication dose was increwsed. Pt is 

asking to change medication to standing every 3 hours, as was given in NH. Pt w/

o SOB, Dizziness, confusion. 


Pt w/o CP, palpitations, abd pain, constipation.





- Current Medication List


Current Medications: 


Active Medications





Acetaminophen (Tylenol -)  650 mg PO TID PRN


   PRN Reason: PAIN


   Last Admin: 04/01/18 09:14 Dose:  650 mg


Albuterol/Ipratropium (Duoneb -)  1 amp NEB Q6H PRN


   PRN Reason: Dyspnea


Amlodipine Besylate (Norvasc -)  2.5 mg PO DAILY [facility]


   Last Admin: 04/01/18 09:14 Dose:  2.5 mg


Cholecalciferol (Vitamin D3 -)  1,000 unit PO DAILY [facility]


   Last Admin: 04/01/18 09:14 Dose:  1,000 unit


Cyanocobalamin (Vitamin B12 -)  1,000 mcg PO DAILY [facility]


   Last Admin: 04/01/18 09:15 Dose:  1,000 mcg


Docusate Sodium (Colace Liquid -)  300 mg PO HS [facility]


   Last Admin: 04/01/18 21:51 Dose:  Not Given


Gabapentin (Neurontin -)  400 mg PO BID [facility]


   Last Admin: 04/01/18 21:51 Dose:  400 mg


Heparin Sodium (Porcine) (Heparin -)  5,000 unit SQ BID [facility]


   Last Admin: 04/01/18 21:52 Dose:  5,000 unit


Hydromorphone HCl (Dilaudid -)  6 mg PO Q3H PRN


   PRN Reason: PAIN LEVEL 6-10


   Last Admin: 04/02/18 06:13 Dose:  6 mg


Vancomycin HCl 1,000 mg/ (Dextrose)  250 mls @ 250 mls/hr IVPB DAILY@1700 [facility]


   Last Admin: 04/01/18 20:22 Dose:  250 mls/hr


Multivitamins/Minerals/Vitamin C (Tab-A-Vit -)  1 tab PO DAILY [facility]


   Last Admin: 04/01/18 09:14 Dose:  1 tab


Ondansetron HCl (Zofran -)  4 mg PO TID PRN


   PRN Reason: NAUSEA


Ranitidine HCl (Zantac -)  150 mg PO DAILY [facility]


   Last Admin: 04/01/18 09:14 Dose:  150 mg


Senna (Senna -)  1 tab PO HS [facility]


   Last Admin: 04/01/18 21:51 Dose:  Not Given











- Objective


Vital Signs: 


 Vital Signs











Temperature  99.3 F   04/02/18 06:09


 


Pulse Rate  83   04/02/18 06:09


 


Respiratory Rate  22   04/02/18 06:09


 


Blood Pressure  125/76   04/02/18 06:09


 


O2 Sat by Pulse Oximetry (%)  95   04/01/18 21:00











Constitutional: Yes: No Distress, Calm


Cardiovascular: Yes: Regular Rate and Rhythm, S1, S2


Respiratory: Yes: Regular, CTA Bilaterally


Gastrointestinal: Yes: Normal Bowel Sounds, Soft.  No: Tenderness


Extremities: Yes: Other (right leg with cleaen dressing)


Edema: No


Neurological: Yes: Alert, Oriented


Labs: 


 CBC, BMP





 04/02/18 06:30 





 04/02/18 06:30 











Problem List





- Problems


(1) Cellulitis


Code(s): L03.90 - CELLULITIS, UNSPECIFIED   


Qualifiers: 


   Site of cellulitis: extremity   Site of cellulitis of extremity: lower 

extremity   Laterality: right   Qualified Code(s): L03.115 - Cellulitis of 

right lower limb   





(2) Bilateral leg ulcer


Code(s): L97.919 - NON-PRS CHRONIC ULC UNSP PRT OF R LOW LEG W UNSP SEVERITY; 

L97.929 - NON-PRS CHRONIC ULC UNSP PRT OF L LOW LEG W UNSP SEVERITY   





(3) Ulcer of right ankle


Code(s): L97.319 - NON-PRESSURE CHRONIC ULCER OF RIGHT ANKLE WITH UNSP SEVERITY

   





(4) Osteomyelitis


Assessment/Plan: 


treated recently with 6 weeks of Vanco


Code(s): M86.9 - OSTEOMYELITIS, UNSPECIFIED   


Qualifiers: 


   Osteomyelitis type: other   Osteomyelitis location: other site   Qualified 

Code(s): M86.8X8 - Other osteomyelitis, other site   





(5) Ankle dislocation


Code(s): S93.06XA - DISLOCATION OF UNSPECIFIED ANKLE JOINT, INITIAL ENCOUNTER   





Assessment/Plan





Cont IV Vanco


ID consult and f/u appreciated.


Podiatry consult appreciated.


Ortho Consult.


AM labs 


Case was d/w pt's nurse (Verito).

## 2018-04-03 LAB
ALBUMIN SERPL-MCNC: 2.3 G/DL (ref 3.4–5)
ALP SERPL-CCNC: 153 U/L (ref 45–117)
ALT SERPL-CCNC: 10 U/L (ref 12–78)
ANION GAP SERPL CALC-SCNC: 7 MMOL/L (ref 8–16)
AST SERPL-CCNC: 13 U/L (ref 15–37)
BILIRUB SERPL-MCNC: 0.2 MG/DL (ref 0.2–1)
BUN SERPL-MCNC: 11 MG/DL (ref 7–18)
CALCIUM SERPL-MCNC: 8.6 MG/DL (ref 8.5–10.1)
CHLORIDE SERPL-SCNC: 96 MMOL/L (ref 98–107)
CO2 SERPL-SCNC: 32 MMOL/L (ref 21–32)
CREAT SERPL-MCNC: 0.4 MG/DL (ref 0.55–1.02)
DEPRECATED RDW RBC AUTO: 13.5 % (ref 11.6–15.6)
GLUCOSE SERPL-MCNC: 96 MG/DL (ref 74–106)
HCT VFR BLD CALC: 36.3 % (ref 32.4–45.2)
HGB BLD-MCNC: 12.1 GM/DL (ref 10.7–15.3)
MCH RBC QN AUTO: 33.1 PG (ref 25.7–33.7)
MCHC RBC AUTO-ENTMCNC: 33.3 G/DL (ref 32–36)
MCV RBC: 99.5 FL (ref 80–96)
PLATELET # BLD AUTO: 296 K/MM3 (ref 134–434)
PMV BLD: 8.1 FL (ref 7.5–11.1)
POTASSIUM SERPLBLD-SCNC: 4 MMOL/L (ref 3.5–5.1)
PROT SERPL-MCNC: 5.8 G/DL (ref 6.4–8.2)
RBC # BLD AUTO: 3.65 M/MM3 (ref 3.6–5.2)
SODIUM SERPL-SCNC: 135 MMOL/L (ref 136–145)
WBC # BLD AUTO: 8.1 K/MM3 (ref 4–10)

## 2018-04-03 RX ADMIN — ACETAMINOPHEN PRN MG: 325 TABLET ORAL at 10:17

## 2018-04-03 RX ADMIN — VANCOMYCIN HYDROCHLORIDE SCH MLS/HR: 1 INJECTION, POWDER, LYOPHILIZED, FOR SOLUTION INTRAVENOUS at 16:42

## 2018-04-03 RX ADMIN — AMLODIPINE BESYLATE SCH: 2.5 TABLET ORAL at 10:31

## 2018-04-03 RX ADMIN — GABAPENTIN SCH MG: 400 CAPSULE ORAL at 10:09

## 2018-04-03 RX ADMIN — RANITIDINE SCH MG: 150 TABLET ORAL at 10:09

## 2018-04-03 RX ADMIN — HEPARIN SODIUM SCH UNIT: 5000 INJECTION, SOLUTION INTRAVENOUS; SUBCUTANEOUS at 10:09

## 2018-04-03 RX ADMIN — GABAPENTIN SCH MG: 400 CAPSULE ORAL at 21:39

## 2018-04-03 RX ADMIN — SENNOSIDES SCH TAB: 8.6 TABLET, FILM COATED ORAL at 21:39

## 2018-04-03 RX ADMIN — HEPARIN SODIUM SCH UNIT: 5000 INJECTION, SOLUTION INTRAVENOUS; SUBCUTANEOUS at 21:40

## 2018-04-03 RX ADMIN — MULTIVITAMIN TABLET SCH TAB: TABLET at 10:09

## 2018-04-03 RX ADMIN — CYANOCOBALAMIN TAB 1000 MCG SCH MCG: 1000 TAB at 10:09

## 2018-04-03 RX ADMIN — DOCUSATE SODIUM LIQUID SCH MG: 100 LIQUID ORAL at 21:39

## 2018-04-03 RX ADMIN — ACETAMINOPHEN PRN MG: 325 TABLET ORAL at 21:40

## 2018-04-03 RX ADMIN — VITAMIN D, TAB 1000IU (100/BT) SCH UNIT: 25 TAB at 10:09

## 2018-04-03 NOTE — PN
Progress Note, Physician


History of Present Illness: 





Pt with right ankle pain better since pain medication was changed to Q3H; pt is 

asking not to change it.


Pt w/o CP, palpitations, abd pain, constipation.





- Current Medication List


Current Medications: 


Active Medications





Acetaminophen (Tylenol -)  650 mg PO TID PRN


   PRN Reason: PAIN


   Last Admin: 04/02/18 21:32 Dose:  650 mg


Albuterol/Ipratropium (Duoneb -)  1 amp NEB Q6H PRN


   PRN Reason: Dyspnea


Amlodipine Besylate (Norvasc -)  2.5 mg PO DAILY [facility]


   Last Admin: 04/02/18 09:13 Dose:  2.5 mg


Cholecalciferol (Vitamin D3 -)  1,000 unit PO DAILY [facility]


   Last Admin: 04/02/18 09:13 Dose:  1,000 unit


Cyanocobalamin (Vitamin B12 -)  1,000 mcg PO DAILY [facility]


   Last Admin: 04/02/18 09:13 Dose:  1,000 mcg


Docusate Sodium (Colace Liquid -)  300 mg PO HS [facility]


   Last Admin: 04/02/18 21:34 Dose:  Not Given


Gabapentin (Neurontin -)  400 mg PO BID [facility]


   Last Admin: 04/02/18 21:33 Dose:  400 mg


Heparin Sodium (Porcine) (Heparin -)  5,000 unit SQ BID [facility]


   Last Admin: 04/02/18 21:34 Dose:  5,000 unit


Hydromorphone HCl (Dilaudid -)  6 mg PO Q3H [facility]


   Last Admin: 04/03/18 07:11 Dose:  Not Given


Vancomycin HCl 1,000 mg/ (Dextrose)  250 mls @ 250 mls/hr IVPB DAILY@1700 [facility]


   Last Admin: 04/02/18 18:20 Dose:  250 mls/hr


Multivitamins/Minerals/Vitamin C (Tab-A-Vit -)  1 tab PO DAILY [facility]


   Last Admin: 04/02/18 09:13 Dose:  1 tab


Ondansetron HCl (Zofran -)  4 mg PO TID PRN


   PRN Reason: NAUSEA


Ranitidine HCl (Zantac -)  150 mg PO DAILY [facility]


   Last Admin: 04/02/18 09:13 Dose:  150 mg


Senna (Senna -)  1 tab PO HS [facility]


   Last Admin: 04/02/18 21:34 Dose:  Not Given











- Objective


Vital Signs: 


 Vital Signs











Temperature  98.2 F   04/03/18 06:14


 


Pulse Rate  80   04/03/18 06:14


 


Respiratory Rate  16   04/03/18 06:14


 


Blood Pressure  143/54   04/03/18 06:14


 


O2 Sat by Pulse Oximetry (%)  94 L  04/02/18 21:00











Constitutional: Yes: No Distress, Calm


Cardiovascular: Yes: Regular Rate and Rhythm, S1, S2


Respiratory: Yes: Regular, CTA Bilaterally.  No: Rales


Gastrointestinal: Yes: Normal Bowel Sounds, Soft.  No: Tenderness


Extremities: Yes: Other (right ankle/ heel clean dressing)


Edema: No


Neurological: Yes: Alert, Oriented


Labs: 


 CBC, BMP





 04/03/18 06:00 





 04/03/18 06:00 











Problem List





- Problems


(1) Cellulitis


Code(s): L03.90 - CELLULITIS, UNSPECIFIED   


Qualifiers: 


   Site of cellulitis: extremity   Site of cellulitis of extremity: lower 

extremity   Laterality: right   Qualified Code(s): L03.115 - Cellulitis of 

right lower limb   





(2) Bilateral leg ulcer


Code(s): L97.919 - NON-PRS CHRONIC ULC UNSP PRT OF R LOW LEG W UNSP SEVERITY; 

L97.929 - NON-PRS CHRONIC ULC UNSP PRT OF L LOW LEG W UNSP SEVERITY   





(3) Ulcer of right ankle


Code(s): L97.319 - NON-PRESSURE CHRONIC ULCER OF RIGHT ANKLE WITH UNSP SEVERITY

   





(4) Osteomyelitis


Code(s): M86.9 - OSTEOMYELITIS, UNSPECIFIED   


Qualifiers: 


   Osteomyelitis type: other   Osteomyelitis location: other site   Qualified 

Code(s): M86.8X8 - Other osteomyelitis, other site   





(5) Ankle dislocation


Code(s): S93.06XA - DISLOCATION OF UNSPECIFIED ANKLE JOINT, INITIAL ENCOUNTER   





(6) Ulcer of right heel


Code(s): L97.419 - NON-PRS CHR ULCER OF RIGHT HEEL AND MIDFOOT W UNSP SEVERT   





Assessment/Plan





Cont IV Vanco- to f/u with ID


ID consult and f/u appreciated.


Podiatry consult appreciated.


Ortho Consult.


Case was d/w pt's nurse (Verito). To monitor pt for sedation.


AM labs

## 2018-04-03 NOTE — PN
Progress Note, Physician


History of Present Illness: 





Awake, alert.


Temp spike noted


C/O  bilateral LE pain with movement


No c/o chills. No dyspnea/ cough/sputum


No dysura  No diarrhea


WBC WNL


 





- Current Medication List


Current Medications: 


Active Medications





Acetaminophen (Tylenol -)  650 mg PO TID PRN


   PRN Reason: PAIN


   Last Admin: 04/03/18 10:17 Dose:  650 mg


Albuterol/Ipratropium (Duoneb -)  1 amp NEB Q6H PRN


   PRN Reason: Dyspnea


Amlodipine Besylate (Norvasc -)  2.5 mg PO DAILY [facility]


   Last Admin: 04/03/18 10:31 Dose:  Not Given


Cholecalciferol (Vitamin D3 -)  1,000 unit PO DAILY [facility]


   Last Admin: 04/03/18 10:09 Dose:  1,000 unit


Cyanocobalamin (Vitamin B12 -)  1,000 mcg PO DAILY [facility]


   Last Admin: 04/03/18 10:09 Dose:  1,000 mcg


Docusate Sodium (Colace Liquid -)  300 mg PO HS [facility]


   Last Admin: 04/02/18 21:34 Dose:  Not Given


Gabapentin (Neurontin -)  400 mg PO BID [facility]


   Last Admin: 04/03/18 10:09 Dose:  400 mg


Heparin Sodium (Porcine) (Heparin -)  5,000 unit SQ BID [facility]


   Last Admin: 04/03/18 10:09 Dose:  5,000 unit


Hydromorphone HCl (Dilaudid -)  6 mg PO Q3H [facility]


   Last Admin: 04/03/18 12:01 Dose:  6 mg


Vancomycin HCl 1,000 mg/ (Dextrose)  250 mls @ 250 mls/hr IVPB DAILY@1700 [facility]


   Last Admin: 04/02/18 18:20 Dose:  250 mls/hr


Multivitamins/Minerals/Vitamin C (Tab-A-Vit -)  1 tab PO DAILY [facility]


   Last Admin: 04/03/18 10:09 Dose:  1 tab


Ondansetron HCl (Zofran -)  4 mg PO TID PRN


   PRN Reason: NAUSEA


Ranitidine HCl (Zantac -)  150 mg PO DAILY [facility]


   Last Admin: 04/03/18 10:09 Dose:  150 mg


Senna (Senna -)  1 tab PO HS [facility]


   Last Admin: 04/02/18 21:34 Dose:  Not Given











- Objective


Vital Signs: 


 Vital Signs











Temperature  98.2 F   04/03/18 06:14


 


Pulse Rate  80   04/03/18 06:14


 


Respiratory Rate  16   04/03/18 06:14


 


Blood Pressure  143/54   04/03/18 06:14


 


O2 Sat by Pulse Oximetry (%)  94 L  04/02/18 21:00











Constitutional: Yes: No Distress, Thin


Cardiovascular: Yes: Regular Rate and Rhythm, S1, S2


Respiratory: Yes: Diminished


Gastrointestinal: Yes: Normal Bowel Sounds, Soft.  No: Tenderness


Extremities: Yes: Other (decreased erythema distal R LE. R foot remains red)


Labs: 


 CBC, BMP





 04/03/18 06:00 





 04/03/18 06:00 











Assessment/Plan


Fever ? souce


Cellulitis R foot/ non-healing ulcer


L leg ulcer/ cellulitis


Hx MRSA





Repeat BC


Continue vancomycin


 Local wound care

## 2018-04-03 NOTE — PN
Progress Note (short form)





- Note


Progress Note: 





Podiatry:





Seen/evaluated at bedside, NAD.  Denies F/V/N/C/SOB/CP.  Persistent severe 

tenderness to R ankle and posterior heel.  Has a history of MRSA osteomyelitis s

/p partial talectomy and bone biopsy.  Treated with IV abx for osteomyelitis in 

the past.   Afebrile, VSS.





MONY:





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 

rim of granulation tissue, serous drainage present, no purulent drainage, no 

fluctuance, mild periwound erythema, no ascending cellulitis, no signs of 

active infection.  Severe pain to palpation and range of motion.  Posterior 

heel pressure ulcer with eschar, no purulent drainage, no fluctuance, no soft 

tissue crepitus, no signs of active infection.





WBC: 8.1





Imp: 86 year old F with R rearfoot pressure ulcer stage 4


1. IV abx per ID


2. Continue palliative local wound care


3. Recommend Orthopedics consultation.  The main issue is the subluxation of 

the ankle joint, which has now caused instability and exposed talar body.  If 

the ankle is stabilized, i.e. with external fixator, she may have chance at 

resolution of ulcer.  However, she is a poor candidate for such a surgical 

procedure.  Prognosis is guarded for limb salvage, no surgical intervention 

from my standpoint.  Discussed case with ID and Vascular Sx.





RAVEN Villa DPM

## 2018-04-03 NOTE — PN
Progress Note (short form)





- Note


Progress Note: 





Pt is an 86 year old female with a long h/o problems in both feet and ankles. 

She has not ambulated in atleast 9 months. She has non healing ulcers on both 

LE. She had severe B/L ankle and foot pain, which has improved somewhat over 

the past few days. She has a h/o osteomyelitis, which is being treated with 6 

weeks of IV Vanco.





PE   B/L LE have significant contractures of the feet and ankles.


      Multiple non healing ulcers, dressings are basically CDI at the moment.


   `   No other acute orthopedic injuries.





Xrays   Show a Left TKR prosthesis which looks fine, no problems.


      Left tib-fib are nl





Imp   87 yo F non ambulator with b/l foot and ankle contractures, non healing 

ulcers. Osteo being treated.





Rec   No orthopedic intervention possible.


      Gen surg consultation, she may require amputation or debridement. Acute pulmonary edema

## 2018-04-04 LAB
ALBUMIN SERPL-MCNC: 2.3 G/DL (ref 3.4–5)
ALP SERPL-CCNC: 146 U/L (ref 45–117)
ALT SERPL-CCNC: 9 U/L (ref 12–78)
ANION GAP SERPL CALC-SCNC: 8 MMOL/L (ref 8–16)
AST SERPL-CCNC: 13 U/L (ref 15–37)
BILIRUB SERPL-MCNC: 0.3 MG/DL (ref 0.2–1)
BUN SERPL-MCNC: 12 MG/DL (ref 7–18)
CALCIUM SERPL-MCNC: 9.2 MG/DL (ref 8.5–10.1)
CHLORIDE SERPL-SCNC: 96 MMOL/L (ref 98–107)
CO2 SERPL-SCNC: 34 MMOL/L (ref 21–32)
CREAT SERPL-MCNC: 0.5 MG/DL (ref 0.55–1.02)
DEPRECATED RDW RBC AUTO: 13.3 % (ref 11.6–15.6)
GLUCOSE SERPL-MCNC: 98 MG/DL (ref 74–106)
HCT VFR BLD CALC: 35 % (ref 32.4–45.2)
HGB BLD-MCNC: 11.7 GM/DL (ref 10.7–15.3)
MCH RBC QN AUTO: 33.3 PG (ref 25.7–33.7)
MCHC RBC AUTO-ENTMCNC: 33.6 G/DL (ref 32–36)
MCV RBC: 99.2 FL (ref 80–96)
PLATELET # BLD AUTO: 302 K/MM3 (ref 134–434)
PMV BLD: 8.2 FL (ref 7.5–11.1)
POTASSIUM SERPLBLD-SCNC: 4.1 MMOL/L (ref 3.5–5.1)
PROT SERPL-MCNC: 5.8 G/DL (ref 6.4–8.2)
RBC # BLD AUTO: 3.52 M/MM3 (ref 3.6–5.2)
SODIUM SERPL-SCNC: 138 MMOL/L (ref 136–145)
WBC # BLD AUTO: 8.6 K/MM3 (ref 4–10)

## 2018-04-04 RX ADMIN — RANITIDINE SCH MG: 150 TABLET ORAL at 10:30

## 2018-04-04 RX ADMIN — VITAMIN D, TAB 1000IU (100/BT) SCH UNIT: 25 TAB at 10:30

## 2018-04-04 RX ADMIN — AMLODIPINE BESYLATE SCH: 2.5 TABLET ORAL at 10:31

## 2018-04-04 RX ADMIN — CYANOCOBALAMIN TAB 1000 MCG SCH MCG: 1000 TAB at 10:30

## 2018-04-04 RX ADMIN — MULTIVITAMIN TABLET SCH TAB: TABLET at 10:30

## 2018-04-04 RX ADMIN — VANCOMYCIN HYDROCHLORIDE SCH MLS/HR: 1 INJECTION, POWDER, LYOPHILIZED, FOR SOLUTION INTRAVENOUS at 16:58

## 2018-04-04 RX ADMIN — HEPARIN SODIUM SCH UNIT: 5000 INJECTION, SOLUTION INTRAVENOUS; SUBCUTANEOUS at 10:30

## 2018-04-04 RX ADMIN — GABAPENTIN SCH MG: 400 CAPSULE ORAL at 21:23

## 2018-04-04 RX ADMIN — HEPARIN SODIUM SCH UNIT: 5000 INJECTION, SOLUTION INTRAVENOUS; SUBCUTANEOUS at 21:24

## 2018-04-04 RX ADMIN — GABAPENTIN SCH MG: 400 CAPSULE ORAL at 10:29

## 2018-04-04 RX ADMIN — DOCUSATE SODIUM LIQUID SCH MG: 100 LIQUID ORAL at 21:23

## 2018-04-04 RX ADMIN — SENNOSIDES SCH TAB: 8.6 TABLET, FILM COATED ORAL at 21:23

## 2018-04-04 NOTE — PN
Progress Note, Physician


History of Present Illness: 





Awake, alert.


C/O  bilateral LE pain with movement


Low grade temp overnight


BC  prelim no growth


WBC WNL


 





- Current Medication List


Current Medications: 


Active Medications





Acetaminophen (Tylenol -)  650 mg PO TID PRN


   PRN Reason: PAIN


   Last Admin: 04/03/18 21:40 Dose:  650 mg


Albuterol/Ipratropium (Duoneb -)  1 amp NEB Q6H PRN


   PRN Reason: Dyspnea


Amlodipine Besylate (Norvasc -)  2.5 mg PO DAILY [facility]


   Last Admin: 04/04/18 10:31 Dose:  Not Given


Cholecalciferol (Vitamin D3 -)  1,000 unit PO DAILY [facility]


   Last Admin: 04/04/18 10:30 Dose:  1,000 unit


Cyanocobalamin (Vitamin B12 -)  1,000 mcg PO DAILY [facility]


   Last Admin: 04/04/18 10:30 Dose:  1,000 mcg


Docusate Sodium (Colace Liquid -)  300 mg PO HS [facility]


   Last Admin: 04/03/18 21:39 Dose:  300 mg


Gabapentin (Neurontin -)  400 mg PO BID [facility]


   Last Admin: 04/04/18 10:29 Dose:  400 mg


Heparin Sodium (Porcine) (Heparin -)  5,000 unit SQ BID [facility]


   Last Admin: 04/04/18 10:30 Dose:  5,000 unit


Hydromorphone HCl (Dilaudid -)  6 mg PO Q3H [facility]


   Last Admin: 04/04/18 08:42 Dose:  6 mg


Vancomycin HCl 1,000 mg/ (Dextrose)  250 mls @ 250 mls/hr IVPB DAILY@1700 [facility]


   Last Admin: 04/03/18 16:42 Dose:  250 mls/hr


Multivitamins/Minerals/Vitamin C (Tab-A-Vit -)  1 tab PO DAILY [facility]


   Last Admin: 04/04/18 10:30 Dose:  1 tab


Ondansetron HCl (Zofran -)  4 mg PO TID PRN


   PRN Reason: NAUSEA


Ranitidine HCl (Zantac -)  150 mg PO DAILY [facility]


   Last Admin: 04/04/18 10:30 Dose:  150 mg


Senna (Senna -)  1 tab PO HS [facility]


   Last Admin: 04/03/18 21:39 Dose:  1 tab











- Objective


Vital Signs: 


 Vital Signs











Temperature  99.3 F   04/04/18 09:00


 


Pulse Rate  89   04/04/18 09:00


 


Respiratory Rate  17   04/04/18 09:00


 


Blood Pressure  99/62   04/04/18 09:00


 


O2 Sat by Pulse Oximetry (%)  95   04/03/18 20:06











Constitutional: Yes: No Distress, Thin


Eyes: Yes: Conjunctiva Clear


Cardiovascular: Yes: Regular Rate and Rhythm, S1, S2


Respiratory: Yes: Diminished


Gastrointestinal: Yes: Normal Bowel Sounds, Soft.  No: Tenderness


Extremities: Yes: Other (decreased erythema/warmth LE bilaterally. R foot 

remains swollen/ red. + bilateral LE ulcers)


Labs: 


 CBC, BMP





 04/04/18 06:00 





 04/04/18 06:00 











Assessment/Plan


Fever ? skin souce


Cellulitis R foot/ non-healing ulcer


L leg ulcer/ cellulitis


Hx MRSA





Repeat BC pending


Continue vancomycin


 Local wound care

## 2018-04-04 NOTE — PN
Progress Note, Physician


History of Present Illness: 





Pt's right ankle pain is controlled by medication.


Pt w/o CP, palpitations, abd pain.


No BM yesterday.





- Current Medication List


Current Medications: 


Active Medications





Acetaminophen (Tylenol -)  650 mg PO TID PRN


   PRN Reason: PAIN


   Last Admin: 04/03/18 21:40 Dose:  650 mg


Albuterol/Ipratropium (Duoneb -)  1 amp NEB Q6H PRN


   PRN Reason: Dyspnea


Amlodipine Besylate (Norvasc -)  2.5 mg PO DAILY [facility]


   Last Admin: 04/04/18 10:31 Dose:  Not Given


Cholecalciferol (Vitamin D3 -)  1,000 unit PO DAILY [facility]


   Last Admin: 04/04/18 10:30 Dose:  1,000 unit


Cyanocobalamin (Vitamin B12 -)  1,000 mcg PO DAILY [facility]


   Last Admin: 04/04/18 10:30 Dose:  1,000 mcg


Docusate Sodium (Colace Liquid -)  300 mg PO HS [facility]


   Last Admin: 04/03/18 21:39 Dose:  300 mg


Gabapentin (Neurontin -)  400 mg PO BID [facility]


   Last Admin: 04/04/18 10:29 Dose:  400 mg


Heparin Sodium (Porcine) (Heparin -)  5,000 unit SQ BID [facility]


   Last Admin: 04/04/18 10:30 Dose:  5,000 unit


Hydromorphone HCl (Dilaudid -)  6 mg PO Q3H [facility]


   Last Admin: 04/04/18 08:42 Dose:  6 mg


Vancomycin HCl 1,000 mg/ (Dextrose)  250 mls @ 250 mls/hr IVPB DAILY@1700 [facility]


   Last Admin: 04/03/18 16:42 Dose:  250 mls/hr


Multivitamins/Minerals/Vitamin C (Tab-A-Vit -)  1 tab PO DAILY [facility]


   Last Admin: 04/04/18 10:30 Dose:  1 tab


Ondansetron HCl (Zofran -)  4 mg PO TID PRN


   PRN Reason: NAUSEA


Ranitidine HCl (Zantac -)  150 mg PO DAILY [facility]


   Last Admin: 04/04/18 10:30 Dose:  150 mg


Senna (Senna -)  1 tab PO HS [facility]


   Last Admin: 04/03/18 21:39 Dose:  1 tab











- Objective


Vital Signs: 


 Vital Signs











Temperature  99.3 F   04/04/18 09:00


 


Pulse Rate  89   04/04/18 09:00


 


Respiratory Rate  17   04/04/18 09:00


 


Blood Pressure  99/62   04/04/18 09:00


 


O2 Sat by Pulse Oximetry (%)  95   04/03/18 20:06











Constitutional: Yes: No Distress, Calm


Cardiovascular: Yes: Regular Rate and Rhythm, S1, S2


Respiratory: Yes: Regular, CTA Bilaterally


Gastrointestinal: Yes: Normal Bowel Sounds, Soft.  No: Tenderness


Extremities: Yes: Other (Right foot pretibial ulcer; right ankle ulcer; right 

heel escar.  Left pretibial ulcers)


Edema: Yes (right foot)


Neurological: Yes: Alert, Oriented


Labs: 


 CBC, BMP





 04/04/18 06:00 





 04/04/18 06:00 











Problem List





- Problems


(1) Cellulitis


Code(s): L03.90 - CELLULITIS, UNSPECIFIED   


Qualifiers: 


   Site of cellulitis: extremity   Site of cellulitis of extremity: lower 

extremity   Laterality: right   Qualified Code(s): L03.115 - Cellulitis of 

right lower limb   





(2) Bilateral leg ulcer


Code(s): L97.919 - NON-PRS CHRONIC ULC UNSP PRT OF R LOW LEG W UNSP SEVERITY; 

L97.929 - NON-PRS CHRONIC ULC UNSP PRT OF L LOW LEG W UNSP SEVERITY   





(3) Ulcer of right ankle


Code(s): L97.319 - NON-PRESSURE CHRONIC ULCER OF RIGHT ANKLE WITH UNSP SEVERITY

   





(4) Osteomyelitis


Code(s): M86.9 - OSTEOMYELITIS, UNSPECIFIED   


Qualifiers: 


   Osteomyelitis type: other   Osteomyelitis location: other site   Qualified 

Code(s): M86.8X8 - Other osteomyelitis, other site   





(5) Ankle dislocation


Code(s): S93.06XA - DISLOCATION OF UNSPECIFIED ANKLE JOINT, INITIAL ENCOUNTER   





(6) Ulcer of right heel


Code(s): L97.419 - NON-PRS CHR ULCER OF RIGHT HEEL AND MIDFOOT W UNSP SEVERT   





Assessment/Plan





Cont IV Vanco- to f/u with ID


ID consult and f/u appreciated; pt was seen this AM with Dr. Espinoza.


Podiatry consult appreciated, no intervention.


Ortho Consult appreciated, no intervantion.


Case was d/w pt's nurse.


AM labs

## 2018-04-05 LAB
ALBUMIN SERPL-MCNC: 2 G/DL (ref 3.4–5)
ALP SERPL-CCNC: 132 U/L (ref 45–117)
ALT SERPL-CCNC: 9 U/L (ref 12–78)
ANION GAP SERPL CALC-SCNC: 11 MMOL/L (ref 8–16)
AST SERPL-CCNC: 13 U/L (ref 15–37)
BILIRUB SERPL-MCNC: 0.3 MG/DL (ref 0.2–1)
BUN SERPL-MCNC: 10 MG/DL (ref 7–18)
CALCIUM SERPL-MCNC: 8.4 MG/DL (ref 8.5–10.1)
CHLORIDE SERPL-SCNC: 93 MMOL/L (ref 98–107)
CO2 SERPL-SCNC: 29 MMOL/L (ref 21–32)
CREAT SERPL-MCNC: 0.4 MG/DL (ref 0.55–1.02)
DEPRECATED RDW RBC AUTO: 13.3 % (ref 11.6–15.6)
GLUCOSE SERPL-MCNC: 94 MG/DL (ref 74–106)
HCT VFR BLD CALC: 32.7 % (ref 32.4–45.2)
HGB BLD-MCNC: 11.1 GM/DL (ref 10.7–15.3)
MCH RBC QN AUTO: 33.6 PG (ref 25.7–33.7)
MCHC RBC AUTO-ENTMCNC: 34 G/DL (ref 32–36)
MCV RBC: 98.7 FL (ref 80–96)
PLATELET # BLD AUTO: 313 K/MM3 (ref 134–434)
PMV BLD: 8.2 FL (ref 7.5–11.1)
POTASSIUM SERPLBLD-SCNC: 4 MMOL/L (ref 3.5–5.1)
PROT SERPL-MCNC: 5.6 G/DL (ref 6.4–8.2)
RBC # BLD AUTO: 3.31 M/MM3 (ref 3.6–5.2)
SODIUM SERPL-SCNC: 133 MMOL/L (ref 136–145)
WBC # BLD AUTO: 9.2 K/MM3 (ref 4–10)

## 2018-04-05 RX ADMIN — MULTIVITAMIN TABLET SCH TAB: TABLET at 10:00

## 2018-04-05 RX ADMIN — VANCOMYCIN HYDROCHLORIDE SCH MLS/HR: 1 INJECTION, POWDER, LYOPHILIZED, FOR SOLUTION INTRAVENOUS at 17:04

## 2018-04-05 RX ADMIN — HEPARIN SODIUM SCH UNIT: 5000 INJECTION, SOLUTION INTRAVENOUS; SUBCUTANEOUS at 22:00

## 2018-04-05 RX ADMIN — SENNOSIDES SCH TAB: 8.6 TABLET, FILM COATED ORAL at 21:59

## 2018-04-05 RX ADMIN — GABAPENTIN SCH MG: 400 CAPSULE ORAL at 10:00

## 2018-04-05 RX ADMIN — CYANOCOBALAMIN TAB 1000 MCG SCH MCG: 1000 TAB at 10:00

## 2018-04-05 RX ADMIN — RANITIDINE SCH MG: 150 TABLET ORAL at 10:00

## 2018-04-05 RX ADMIN — HEPARIN SODIUM SCH UNIT: 5000 INJECTION, SOLUTION INTRAVENOUS; SUBCUTANEOUS at 10:00

## 2018-04-05 RX ADMIN — DOCUSATE SODIUM LIQUID SCH MG: 100 LIQUID ORAL at 21:59

## 2018-04-05 RX ADMIN — ACETAMINOPHEN PRN MG: 325 TABLET ORAL at 21:59

## 2018-04-05 RX ADMIN — VITAMIN D, TAB 1000IU (100/BT) SCH UNIT: 25 TAB at 10:00

## 2018-04-05 RX ADMIN — GABAPENTIN SCH MG: 400 CAPSULE ORAL at 22:10

## 2018-04-05 RX ADMIN — AMLODIPINE BESYLATE SCH: 2.5 TABLET ORAL at 10:00

## 2018-04-05 NOTE — PN
Progress Note, Physician


History of Present Illness: 





Pt's right ankle pain is controlled by medication.


Pt w/o CP, palpitations, abd pain.


No BM yet this AM; pt agrees with Miralax today if o BM.





- Current Medication List


Current Medications: 


Active Medications





Acetaminophen (Tylenol -)  650 mg PO TID PRN


   PRN Reason: PAIN


   Last Admin: 04/03/18 21:40 Dose:  650 mg


Albuterol/Ipratropium (Duoneb -)  1 amp NEB Q6H PRN


   PRN Reason: Dyspnea


Amlodipine Besylate (Norvasc -)  2.5 mg PO DAILY [facility]


   Last Admin: 04/04/18 10:31 Dose:  Not Given


Cholecalciferol (Vitamin D3 -)  1,000 unit PO DAILY [facility]


   Last Admin: 04/04/18 10:30 Dose:  1,000 unit


Cyanocobalamin (Vitamin B12 -)  1,000 mcg PO DAILY [facility]


   Last Admin: 04/04/18 10:30 Dose:  1,000 mcg


Docusate Sodium (Colace Liquid -)  300 mg PO [facility]


   Last Admin: 04/04/18 21:23 Dose:  300 mg


Gabapentin (Neurontin -)  400 mg PO BID [facility]


   Last Admin: 04/04/18 21:23 Dose:  400 mg


Heparin Sodium (Porcine) (Heparin -)  5,000 unit SQ BID [facility]


   Last Admin: 04/04/18 21:24 Dose:  5,000 unit


Hydromorphone HCl (Dilaudid -)  6 mg PO Q3H [facility]


   Last Admin: 04/05/18 08:40 Dose:  6 mg


Vancomycin HCl 1,000 mg/ (Dextrose)  250 mls @ 250 mls/hr IVPB DAILY@1700 [facility]


   Last Admin: 04/04/18 16:58 Dose:  250 mls/hr


Multivitamins/Minerals/Vitamin C (Tab-A-Vit -)  1 tab PO DAILY [facility]


   Last Admin: 04/04/18 10:30 Dose:  1 tab


Ondansetron HCl (Zofran -)  4 mg PO TID PRN


   PRN Reason: NAUSEA


Ranitidine HCl (Zantac -)  150 mg PO DAILY [facility]


   Last Admin: 04/04/18 10:30 Dose:  150 mg


Senna (Senna -)  2 tab PO [facility]











- Objective


Vital Signs: 


 Vital Signs











Temperature  99.6 F   04/05/18 06:00


 


Pulse Rate  82   04/05/18 06:00


 


Respiratory Rate  18   04/05/18 06:00


 


Blood Pressure  104/59   04/05/18 06:00


 


O2 Sat by Pulse Oximetry (%)  95   04/04/18 21:39











Constitutional: Yes: No Distress, Calm


Cardiovascular: Yes: Regular Rate and Rhythm, S1, S2


Respiratory: Yes: Regular, CTA Bilaterally.  No: Rales


Gastrointestinal: Yes: Normal Bowel Sounds, Soft.  No: Tenderness


Neurological: Yes: Alert, Oriented


Labs: 


 CBC, BMP





 04/05/18 06:00 





 04/05/18 06:00 











Problem List





- Problems


(1) Cellulitis


Code(s): L03.90 - CELLULITIS, UNSPECIFIED   


Qualifiers: 


   Site of cellulitis: extremity   Site of cellulitis of extremity: lower 

extremity   Laterality: right   Qualified Code(s): L03.115 - Cellulitis of 

right lower limb   





(2) Bilateral leg ulcer


Code(s): L97.919 - NON-PRS CHRONIC ULC UNSP PRT OF R LOW LEG W UNSP SEVERITY; 

L97.929 - NON-PRS CHRONIC ULC UNSP PRT OF L LOW LEG W UNSP SEVERITY   





(3) Ulcer of right ankle


Code(s): L97.319 - NON-PRESSURE CHRONIC ULCER OF RIGHT ANKLE WITH UNSP SEVERITY

   





(4) Osteomyelitis


Code(s): M86.9 - OSTEOMYELITIS, UNSPECIFIED   


Qualifiers: 


   Osteomyelitis type: other   Osteomyelitis location: other site   Qualified 

Code(s): M86.8X8 - Other osteomyelitis, other site   





(5) Ankle dislocation


Code(s): S93.06XA - DISLOCATION OF UNSPECIFIED ANKLE JOINT, INITIAL ENCOUNTER   





(6) Ulcer of right heel


Code(s): L97.419 - NON-PRS CHR ULCER OF RIGHT HEEL AND MIDFOOT W UNSP SEVERT   





Assessment/Plan





Cont IV Vanco- to f/u with ID


ID consult and f/u appreciated.


Podiatry consult appreciated, no intervention.


Ortho Consult appreciated, no intervantion.


Case was d/w pt's nurse.


AM labs

## 2018-04-05 NOTE — PN
Progress Note, Physician


History of Present Illness: 





Awake, alert.


C/O  bilateral LE pain with movement


BC   no growth


WBC WNL


 





- Current Medication List


Current Medications: 


Active Medications





Acetaminophen (Tylenol -)  650 mg PO TID PRN


   PRN Reason: PAIN


   Last Admin: 04/03/18 21:40 Dose:  650 mg


Albuterol/Ipratropium (Duoneb -)  1 amp NEB Q6H PRN


   PRN Reason: Dyspnea


Amlodipine Besylate (Norvasc -)  2.5 mg PO DAILY [facility]


   Last Admin: 04/05/18 10:00 Dose:  Not Given


Cholecalciferol (Vitamin D3 -)  1,000 unit PO DAILY [facility]


   Last Admin: 04/05/18 10:00 Dose:  1,000 unit


Cyanocobalamin (Vitamin B12 -)  1,000 mcg PO DAILY [facility]


   Last Admin: 04/05/18 10:00 Dose:  1,000 mcg


Docusate Sodium (Colace Liquid -)  300 mg PO [facility]


   Last Admin: 04/04/18 21:23 Dose:  300 mg


Gabapentin (Neurontin -)  400 mg PO BID [facility]


   Last Admin: 04/05/18 10:00 Dose:  400 mg


Heparin Sodium (Porcine) (Heparin -)  5,000 unit SQ BID [facility]


   Last Admin: 04/05/18 10:00 Dose:  5,000 unit


Hydromorphone HCl (Dilaudid -)  6 mg PO Q3H [facility]


   Last Admin: 04/05/18 11:33 Dose:  6 mg


Vancomycin HCl 1,000 mg/ (Dextrose)  250 mls @ 250 mls/hr IVPB DAILY@1700 [facility]


   Last Admin: 04/04/18 16:58 Dose:  250 mls/hr


Multivitamins/Minerals/Vitamin C (Tab-A-Vit -)  1 tab PO DAILY [facility]


   Last Admin: 04/05/18 10:00 Dose:  1 tab


Ondansetron HCl (Zofran -)  4 mg PO TID PRN


   PRN Reason: NAUSEA


Ranitidine HCl (Zantac -)  150 mg PO DAILY [facility]


   Last Admin: 04/05/18 10:00 Dose:  150 mg


Senna (Senna -)  2 tab PO [facility]











- Objective


Vital Signs: 


 Vital Signs











Temperature  98.3 F   04/05/18 09:00


 


Pulse Rate  87   04/05/18 09:00


 


Respiratory Rate  18   04/05/18 09:00


 


Blood Pressure  95/48   04/05/18 09:00


 


O2 Sat by Pulse Oximetry (%)  96   04/05/18 09:00











Constitutional: Yes: No Distress


Eyes: Yes: Conjunctiva Clear


Cardiovascular: Yes: Regular Rate and Rhythm, S1, S2


Respiratory: Yes: CTA Bilaterally


Gastrointestinal: Yes: Normal Bowel Sounds, Soft.  No: Tenderness


Extremities: Yes: Other (erythema R foot, L LE nearly all resolved)


Labs: 


 CBC, BMP





 04/05/18 06:00 





 04/05/18 06:00 











Assessment/Plan


Fever  resolved


Cellulitis R foot/ non-healing ulcer- improved


L leg ulcer/ cellulitis  - improved


Hx MRSA





 


Continue vancomycin additional 24hr


 Local wound care

## 2018-04-06 LAB
ANION GAP SERPL CALC-SCNC: 8 MMOL/L (ref 8–16)
BUN SERPL-MCNC: 10 MG/DL (ref 7–18)
CALCIUM SERPL-MCNC: 9.1 MG/DL (ref 8.5–10.1)
CHLORIDE SERPL-SCNC: 96 MMOL/L (ref 98–107)
CO2 SERPL-SCNC: 31 MMOL/L (ref 21–32)
CREAT SERPL-MCNC: 0.4 MG/DL (ref 0.55–1.02)
DEPRECATED RDW RBC AUTO: 13.3 % (ref 11.6–15.6)
GLUCOSE SERPL-MCNC: 94 MG/DL (ref 74–106)
HCT VFR BLD CALC: 35.3 % (ref 32.4–45.2)
HGB BLD-MCNC: 11.8 GM/DL (ref 10.7–15.3)
MCH RBC QN AUTO: 33.1 PG (ref 25.7–33.7)
MCHC RBC AUTO-ENTMCNC: 33.5 G/DL (ref 32–36)
MCV RBC: 98.8 FL (ref 80–96)
PLATELET # BLD AUTO: 321 K/MM3 (ref 134–434)
PMV BLD: 8.1 FL (ref 7.5–11.1)
POTASSIUM SERPLBLD-SCNC: 4.8 MMOL/L (ref 3.5–5.1)
RBC # BLD AUTO: 3.58 M/MM3 (ref 3.6–5.2)
SODIUM SERPL-SCNC: 135 MMOL/L (ref 136–145)
WBC # BLD AUTO: 8.5 K/MM3 (ref 4–10)

## 2018-04-06 RX ADMIN — HEPARIN SODIUM SCH UNIT: 5000 INJECTION, SOLUTION INTRAVENOUS; SUBCUTANEOUS at 22:08

## 2018-04-06 RX ADMIN — GABAPENTIN SCH MG: 400 CAPSULE ORAL at 22:07

## 2018-04-06 RX ADMIN — SENNOSIDES SCH: 8.6 TABLET, FILM COATED ORAL at 22:14

## 2018-04-06 RX ADMIN — VITAMIN D, TAB 1000IU (100/BT) SCH UNIT: 25 TAB at 09:28

## 2018-04-06 RX ADMIN — DOXYCYCLINE HYCLATE SCH MG: 100 CAPSULE ORAL at 17:10

## 2018-04-06 RX ADMIN — COLLAGENASE SANTYL SCH: 250 OINTMENT TOPICAL at 18:02

## 2018-04-06 RX ADMIN — AMLODIPINE BESYLATE SCH MG: 2.5 TABLET ORAL at 09:32

## 2018-04-06 RX ADMIN — CYANOCOBALAMIN TAB 1000 MCG SCH MCG: 1000 TAB at 09:28

## 2018-04-06 RX ADMIN — GABAPENTIN SCH MG: 400 CAPSULE ORAL at 09:28

## 2018-04-06 RX ADMIN — MULTIVITAMIN TABLET SCH TAB: TABLET at 09:28

## 2018-04-06 RX ADMIN — HEPARIN SODIUM SCH UNIT: 5000 INJECTION, SOLUTION INTRAVENOUS; SUBCUTANEOUS at 09:29

## 2018-04-06 RX ADMIN — DOCUSATE SODIUM LIQUID SCH: 100 LIQUID ORAL at 22:14

## 2018-04-06 RX ADMIN — RANITIDINE SCH MG: 150 TABLET ORAL at 09:28

## 2018-04-06 NOTE — PN
Progress Note (short form)





- Note


Progress Note: 





VAscular Surgery 





pt seen and examined. 


Right ankle looks a lot better than last week. 


Cellulitis has resovled. 


Spoke to pt about all options. 


I spoke to her about amputation -- but we both agree that it is not 


the best option. Sister would like conservative therapy -- in the form 


of long term antiobiotics. She understands it is suppressive therapy, due to 

her 


bone exposed in her right ankle. 


Rec DC to NH on long term doxycycline if that is ok with ID. 


[place] to all wounds. 





Avila Hoyos DO

## 2018-04-06 NOTE — PN
Progress Note, Physician


History of Present Illness: 








Pt w/o fever, chills, CP, palpitations, abd pain.


Pt had BM yesterday, no Miralax needed.





- Current Medication List


Current Medications: 


Active Medications





Acetaminophen (Tylenol -)  650 mg PO TID PRN


   PRN Reason: PAIN


   Last Admin: 04/05/18 21:59 Dose:  650 mg


Albuterol/Ipratropium (Duoneb -)  1 amp NEB Q6H PRN


   PRN Reason: Dyspnea


Amlodipine Besylate (Norvasc -)  2.5 mg PO DAILY [facility]


   Last Admin: 04/06/18 09:32 Dose:  2.5 mg


Cholecalciferol (Vitamin D3 -)  1,000 unit PO DAILY [facility]


   Last Admin: 04/06/18 09:28 Dose:  1,000 unit


Cyanocobalamin (Vitamin B12 -)  1,000 mcg PO DAILY [facility]


   Last Admin: 04/06/18 09:28 Dose:  1,000 mcg


Docusate Sodium (Colace Liquid -)  300 mg PO HS [facility]


   Last Admin: 04/05/18 21:59 Dose:  300 mg


Gabapentin (Neurontin -)  400 mg PO BID [facility]


   Last Admin: 04/06/18 09:28 Dose:  400 mg


Heparin Sodium (Porcine) (Heparin -)  5,000 unit SQ BID [facility]


   Last Admin: 04/06/18 09:29 Dose:  5,000 unit


Hydromorphone HCl (Dilaudid -)  6 mg PO Q3H [facility]


   Last Admin: 04/06/18 09:28 Dose:  6 mg


Vancomycin HCl 1,000 mg/ (Dextrose)  250 mls @ 250 mls/hr IVPB DAILY@1700 [facility]


   Last Admin: 04/05/18 17:04 Dose:  250 mls/hr


Multivitamins/Minerals/Vitamin C (Tab-A-Vit -)  1 tab PO DAILY [facility]


   Last Admin: 04/06/18 09:28 Dose:  1 tab


Ondansetron HCl (Zofran -)  4 mg PO TID PRN


   PRN Reason: NAUSEA


Ranitidine HCl (Zantac -)  150 mg PO DAILY [facility]


   Last Admin: 04/06/18 09:28 Dose:  150 mg


Senna (Senna -)  2 tab PO HS [facility]


   Last Admin: 04/05/18 21:59 Dose:  2 tab











- Objective


Vital Signs: 


 Vital Signs











Temperature  97.8 F   04/06/18 05:57


 


Pulse Rate  62   04/06/18 05:57


 


Respiratory Rate  20   04/06/18 05:57


 


Blood Pressure  144/74   04/06/18 05:57


 


O2 Sat by Pulse Oximetry (%)  94 L  04/05/18 21:00











Cardiovascular: Yes: Regular Rate and Rhythm, S1, S2


Respiratory: Yes: Regular, CTA Bilaterally.  No: Rales


Gastrointestinal: Yes: Normal Bowel Sounds, Soft.  No: Tenderness


Edema: RLE: 1+


Neurological: Yes: Alert, Oriented


Labs: 


 CBC, BMP





 04/06/18 06:20 





 04/06/18 06:20 











Problem List





- Problems


(1) Cellulitis


Code(s): L03.90 - CELLULITIS, UNSPECIFIED   


Qualifiers: 


   Site of cellulitis: extremity   Site of cellulitis of extremity: lower 

extremity   Laterality: right   Qualified Code(s): L03.115 - Cellulitis of 

right lower limb   





(2) Bilateral leg ulcer


Code(s): L97.919 - NON-PRS CHRONIC ULC UNSP PRT OF R LOW LEG W UNSP SEVERITY; 

L97.929 - NON-PRS CHRONIC ULC UNSP PRT OF L LOW LEG W UNSP SEVERITY   





(3) Ulcer of right ankle


Code(s): L97.319 - NON-PRESSURE CHRONIC ULCER OF RIGHT ANKLE WITH UNSP SEVERITY

   





(4) Osteomyelitis


Code(s): M86.9 - OSTEOMYELITIS, UNSPECIFIED   


Qualifiers: 


   Osteomyelitis type: other   Osteomyelitis location: other site   Qualified 

Code(s): M86.8X8 - Other osteomyelitis, other site   





(5) Ankle dislocation


Code(s): S93.06XA - DISLOCATION OF UNSPECIFIED ANKLE JOINT, INITIAL ENCOUNTER   





(6) Ulcer of right heel


Code(s): L97.419 - NON-PRS CHR ULCER OF RIGHT HEEL AND MIDFOOT W UNSP SEVERT   





Assessment/Plan





Cont IV Vanco- to f/u with ID


ID consult and f/u appreciated.


Podiatry consult appreciated, no intervention.


Ortho Consult appreciated, no intervantion.


Vascular surgery consult.


Case was d/w pt's nurse.


AM labs

## 2018-04-06 NOTE — PN
Progress Note, Physician


History of Present Illness: 





Awake, alert.


No c/o foot pain at rest


BC   no growth


WBC WNL


 





- Current Medication List


Current Medications: 


Active Medications





Acetaminophen (Tylenol -)  650 mg PO TID PRN


   PRN Reason: PAIN


   Last Admin: 04/05/18 21:59 Dose:  650 mg


Albuterol/Ipratropium (Duoneb -)  1 amp NEB Q6H PRN


   PRN Reason: Dyspnea


Amlodipine Besylate (Norvasc -)  2.5 mg PO DAILY [facility]


   Last Admin: 04/06/18 09:32 Dose:  2.5 mg


Cholecalciferol (Vitamin D3 -)  1,000 unit PO DAILY [facility]


   Last Admin: 04/06/18 09:28 Dose:  1,000 unit


Cyanocobalamin (Vitamin B12 -)  1,000 mcg PO DAILY [facility]


   Last Admin: 04/06/18 09:28 Dose:  1,000 mcg


Docusate Sodium (Colace Liquid -)  300 mg PO HS [facility]


   Last Admin: 04/05/18 21:59 Dose:  300 mg


Gabapentin (Neurontin -)  400 mg PO BID [facility]


   Last Admin: 04/06/18 09:28 Dose:  400 mg


Heparin Sodium (Porcine) (Heparin -)  5,000 unit SQ BID [facility]


   Last Admin: 04/06/18 09:29 Dose:  5,000 unit


Hydromorphone HCl (Dilaudid -)  6 mg PO Q3H [facility]


   Last Admin: 04/06/18 12:30 Dose:  6 mg


Vancomycin HCl 1,000 mg/ (Dextrose)  250 mls @ 250 mls/hr IVPB DAILY@1700 [facility]


   Last Admin: 04/05/18 17:04 Dose:  250 mls/hr


Multivitamins/Minerals/Vitamin C (Tab-A-Vit -)  1 tab PO DAILY [facility]


   Last Admin: 04/06/18 09:28 Dose:  1 tab


Ondansetron HCl (Zofran -)  4 mg PO TID PRN


   PRN Reason: NAUSEA


Ranitidine HCl (Zantac -)  150 mg PO DAILY [facility]


   Last Admin: 04/06/18 09:28 Dose:  150 mg


Senna (Senna -)  2 tab PO HS [facility]


   Last Admin: 04/05/18 21:59 Dose:  2 tab











- Objective


Vital Signs: 


 Vital Signs











Temperature  97.8 F   04/06/18 05:57


 


Pulse Rate  62   04/06/18 05:57


 


Respiratory Rate  20   04/06/18 05:57


 


Blood Pressure  144/74   04/06/18 05:57


 


O2 Sat by Pulse Oximetry (%)  94 L  04/05/18 21:00











Constitutional: Yes: Cachectic


Cardiovascular: Yes: Regular Rate and Rhythm, S1, S2


Respiratory: Yes: CTA Bilaterally


Gastrointestinal: Yes: Normal Bowel Sounds, Soft.  No: Tenderness


Extremities: Yes: Other (erythema LE resolved   + ulcers)


Labs: 


 CBC, BMP





 04/06/18 06:20 





 04/06/18 06:20 











Assessment/Plan


Fever  resolved


Cellulitis R foot/ non-healing ulcer- improved


L leg ulcer/ cellulitis  - improved


Hx MRSA





 


D/C vancomycin


Doxycyline 100mg po bid long term


   antibiotic suppressive therapy

## 2018-04-07 VITALS — DIASTOLIC BLOOD PRESSURE: 64 MMHG | HEART RATE: 85 BPM | SYSTOLIC BLOOD PRESSURE: 109 MMHG | TEMPERATURE: 98.1 F

## 2018-04-07 RX ADMIN — VITAMIN D, TAB 1000IU (100/BT) SCH UNIT: 25 TAB at 11:28

## 2018-04-07 RX ADMIN — DOXYCYCLINE HYCLATE SCH MG: 100 CAPSULE ORAL at 17:24

## 2018-04-07 RX ADMIN — DOXYCYCLINE HYCLATE SCH MG: 100 CAPSULE ORAL at 11:28

## 2018-04-07 RX ADMIN — RANITIDINE SCH MG: 150 TABLET ORAL at 11:28

## 2018-04-07 RX ADMIN — GABAPENTIN SCH MG: 400 CAPSULE ORAL at 11:28

## 2018-04-07 RX ADMIN — MULTIVITAMIN TABLET SCH TAB: TABLET at 11:28

## 2018-04-07 RX ADMIN — CYANOCOBALAMIN TAB 1000 MCG SCH MCG: 1000 TAB at 11:29

## 2018-04-07 RX ADMIN — HEPARIN SODIUM SCH UNIT: 5000 INJECTION, SOLUTION INTRAVENOUS; SUBCUTANEOUS at 11:28

## 2018-04-07 RX ADMIN — COLLAGENASE SANTYL SCH APPLIC: 250 OINTMENT TOPICAL at 11:27

## 2018-04-07 RX ADMIN — AMLODIPINE BESYLATE SCH: 2.5 TABLET ORAL at 11:30

## 2018-04-07 NOTE — DS
Physical Examination


Vital Signs: 


 Vital Signs











Temperature  98.7 F   04/07/18 11:42


 


Pulse Rate  78   04/07/18 11:42


 


Respiratory Rate  18   04/07/18 11:42


 


Blood Pressure  110/58   04/07/18 11:42


 


O2 Sat by Pulse Oximetry (%)  95   04/06/18 21:00











Findings/Remarks: 





Pt w/o fever, chills, SOB, CP, abd pain, constipation, diarrhea.


Constitutional: Yes: No Distress, Calm


Cardiovascular: Yes: Regular Rate and Rhythm, S1, S2


Respiratory: Yes: Regular, CTA Bilaterally.  No: Rales


Gastrointestinal: Yes: Normal Bowel Sounds, Soft.  No: Tenderness


Wound/Incision: Yes: Other (clean dressing)


Neurological: Yes: Alert, Oriented


Labs: 


 CBC, BMP





 04/06/18 06:20 





 04/06/18 06:20 











Discharge Summary


Reason For Visit: OSTEOMYELITIS


Current Active Problems








Cellulitis (Acute)


Ankle dislocation 


Osteomyelitis (Acute)


Ulcer of right heel (Acute)








Procedures: Principal: Foot/ Tibia/ Fibula XR


Hospital Course: 





Pt with bilateral leg ulcer was sent from [facility] to ER for R/O OM; pt was 

noticed to have fever (101.3). Pt had BCX and was started on IV Vanco.  Pt was 

seen in consult by ID (Dr. Espinoza), Podiatry (Dr. Padilla), Ortho (Dr. Marinelli, 

covering Dr. Salomon), Vasc Sx (Dr. Hoyos). Pt's fever and cellulitis resolved. 

Pt to be on long term (to determine) antibiotic therapy. Pt to OR'ed to [facility] under my care.


Condition: Improved





- Instructions


Diet, Activity, Other Instructions: 


Resume diet.


Make appointment with Dr. Hoyos this coming Friday.


Referrals: 


Indra Andujar MD [Primary Care Provider] - 


Disposition: SKILLED NURSING FACILITY





- Home Medications


Comprehensive Discharge Medication List: 


Ambulatory Orders

## 2021-03-05 NOTE — HP
"CC:  Rash  Groin area     Subjective: followup  patient here for rash in groin dist    Has been treating with tacrolimus ointment and has only seen minimal relief. Maybe less itching, but still discolored and will occ wake from sleep at night with itching.     Denies perianal itching.     From prior note:  \"HPI: rash , very itchy   Onset: on off x few years   Aggravating factors:  Unknown   Alleviating factors:  Desitin OTC ,   New creams/topicals:   New medications (up to last 6 months):  None   New travel: no  Other exposures: no  Treatments: no     Had been worsening with heat/sweat suit used for running. Has since stopped wearing.  Boxers/briefs do not seem to impact. Denies soaps/detergents exposures.  Uses sens skin laundry wash.    Denies hx of back injury.\"    History of skin cancer: No  History of biopsies:No  History of blistering/severe sunburns:No  Family history of skin cancer:No  Family history of atypical moles:No    Tobacco use: Never  Alcohol use:denies alcohol consumption  Allergies:No    ROS: no fevers/chills. ++ itch.  No cough  DermPMH: no skin cancer/melanoma  No problem-specific Assessment & Plan notes found for this encounter.    Relevant PMH:  NC    PE: Gen:WDWN male in NAD.  Skin: scrotum with no notable edema and no erosions present.  No erythema appreciated.  No Perianal / intragluteal skin examined today.    Labs:   HSV1/2 Ab negative    A/P: rash/itching:   -consider possible contact exposure vs pruritus scroti with rash/LSC as effect. Denies hx of back injury:  -cont protopic 0.1% oint BID until next visit    Rash NOS: Granulomatous slack skin vs LSC vs PIPA vs other  -consent for bx, including R/B/A. Cleaned with EtOH, anesthesia with lidocaine 1% + epinephrine, 4mm punch bx, 4-0 Prolene to close  -vaseline/bandage and wound care reviewed  -s/r in 2-3 weeks, MD      Itching:  -rec OTC antihistamine use - reviewed daily OTC meds  -trial gabapentin oral trx 300-600 mg PO QHS for " Admitting History and Physical





- Primary Care Physician


PCP: Indra Andujar





- Admission


Chief Complaint: foot ulcer


History of Present Illness: 





Pt with Hx/o PVD, LE ulcers, [facility] by Dr. Hoyos, recently with 

right anterior ankle ulcer, treated locally and with Bactrim ( for 4 weeks), 

seen in [facility] yesterday, noticed to have worsening of the ankle ulcer, 

probable OM, was referred to ER for evaluation/ treatment.


History Source: Patient





- Past Medical History


CNS: Yes: CVA


Musculoskeletal: Yes: Osteoarthritis (multiple sites, sever, on Dilaudid PO)





- Smoking History


Smoking history: Never smoked


Have you smoked in the past 12 months: No





- Alcohol/Substance Use


Hx Alcohol Use: No





Home Medications





- Allergies


Allergies/Adverse Reactions: 


 Allergies











Allergy/AdvReac Type Severity Reaction Status Date / Time


 


No Known Allergies Allergy   Verified 01/15/18 13:26














- Home Medications


Home Medications: 


Ambulatory Orders





Gabapentin [Neurontin -] 400 mg PO BID 06/09/14 


Sennosides [Senna -] 1 mg PO HS 06/09/14 


Simethicone [Gas Relief] 2 tab PO QID 06/09/14 


Acetaminophen [Tylenol] 650 mg PO TID 01/15/18 


Amlodipine Besylate [Norvasc -] 2.5 mg PO DAILY 01/15/18 


Ascorbate Calcium [Vitamin C] 500 mg PO DAILY 01/15/18 


Bacitracin - [Bacitracin Topical Ointment -] 1 applic TP DAILY 01/15/18 


Calcium Polycarbophil [Fiber Laxative] 625 mg PO HS 01/15/18 


Celecoxib [Celebrex] 100 mg PO BID 01/15/18 


Cholecalciferol (Vitamin D3) [Vitamin D3] 1,000 unit PO DAILY 01/15/18 


Collagenase Clostridium Hist. [Santyl] 1 applic TP DAILY 01/15/18 


Cyanocobalamin (Vitamin B-12) [Vitamin B-12] 1,000 mcg PO DAILY 01/15/18 


Docusate Liquid [Colace Liquid -] 300 mg PO HS 01/15/18 


Heparin Sodium,Porcine/Pf [Heparin Sod 5,000 Unit/ 0.5 ml] 5,000 unit SQ BID 01/

15/18 


Hydromorphone [Dilaudid -] 4 mg PO Q4H 01/15/18 


Lactobacillus Rhamnosus GG [Culturelle] 2 each PO DAILY 01/15/18 


Multivitamins [Tab-A-Vit -] 1 tab PO DAILY 01/15/18 


Ondansetron [Zofran -] 4 mg PO TID 01/15/18 


Ranitidine HCl 150 mg PO DAILY 01/15/18 


Vit B Comp/C/Folic/Iron/Vit E [Vitamin B Complex Tablet] 1 each PO DAILY 01/15/

18 











Review of Systems





- Review of Systems


Constitutional: denies: Chills, Fever


Eyes: denies: Blurred Vision, Double Vision, Photophobia


HENT: denies: Difficult Swallowing, Ear Discharge, Ear Pain, Throat Pain


Neck: denies: Stiffness, Tenderness


Cardiovascular: denies: Chest Pain, Edema, Palpitations


Respiratory: denies: Cough, SOB, Wheezing


Gastrointestinal: reports: Constipation (on and off).  denies: Abdominal Pain, 

Diarrhea


Genitourinary: denies: Burning, Discharge, Dysuria


Musculoskeletal: reports: Joint Pain


Integumentary: denies: Bruising, Eczema


Neurological: denies: Change in LOC, Change in Speech


Endocrine: denies: Excessive Sweating, Intolerance to Cold


Hematology/Lymphatic: denies: Easily Bruised, Excessive Bleeding


Psychiatric: denies: Anxiety, Depression





Physical Examination


Vital Signs: 


 Vital Signs











Temperature  98.9 F   01/15/18 13:26


 


Pulse Rate  94 H  01/15/18 13:26


 


Respiratory Rate  17   01/15/18 13:26


 


Blood Pressure  113/47   01/15/18 13:26


 


O2 Sat by Pulse Oximetry (%)  100   01/15/18 13:26











Constitutional: Yes: No Distress, Calm


Eyes: Yes: Conjunctiva Clear, EOM Intact


HENT: Yes: Pharyngeal Erythema.  No: Epistaxis, Rhinnorhea


Neck: Yes: Trachea Midline.  No: Lymphadenopathy


Cardiovascular: Yes: Regular Rate and Rhythm, S1, S2


Respiratory: Yes: Regular, CTA Bilaterally.  No: Rales


Gastrointestinal: Yes: Normal Bowel Sounds, Soft.  No: Tenderness


Edema: No


Wound/Incision: Yes: Other (right ankle stage 4 ulcer; bilat leg ulcers)


Neurological: Yes: Alert, Oriented, Other (motor and sensory examination is 

symmetric in UE/ LE/ face)


Psychiatric: Yes: Alert, Oriented


Labs: 


 CBC, BMP





 01/15/18 16:10 





 01/15/18 16:50 











Imaging





- Results


X-ray: Report Reviewed





Problem List





- Problems


(1) Ulcer of right ankle


Code(s): L97.319 - NON-PRESSURE CHRONIC ULCER OF RIGHT ANKLE WITH UNSP SEVERITY

   





(2) Bilateral leg ulcer


Code(s): L97.919 - NON-PRS CHRONIC ULC UNSP PRT OF R LOW LEG W UNSP SEVERITY; 

L97.929 - NON-PRS CHRONIC ULC UNSP PRT OF L LOW LEG W UNSP SEVERITY   





(3) PVD (peripheral vascular disease)


Code(s): I73.9 - PERIPHERAL VASCULAR DISEASE, UNSPECIFIED   





(4) CVA (cerebral vascular accident)


Code(s): I63.9 - CEREBRAL INFARCTION, UNSPECIFIED   





(5) Hypertension


Code(s): I10 - ESSENTIAL (PRIMARY) HYPERTENSION   





Assessment/Plan





IV abtx


ID consult


Vasc SX consult 


Probable pt would need PICC line and long term abtx treatment. 


AM labs itching  -prev instructions: reduce exposures - no use of sweat/track/biking shorts which may irritate  -consider possible allergy referral for contact allergen    -f/u 4-6 weeks    I have reviewed medications relevant to my specialty.

## 2022-10-12 NOTE — PN
Progress Note (short form)





- Note


Progress Note: 





PULMONARY





States nonproductive cough about the same. No wheezing. Reports temporary 

improvement with nebulizers.





 Last Vital Signs











Temp Pulse Resp BP Pulse Ox


 


 98.7 F   84   19   135/78   94 L


 


 01/23/18 06:52  01/23/18 06:52  01/23/18 06:52  01/23/18 06:52  01/22/18 21:00








Gen:  NAD at rest


Heart: RRR


Lung: bilateral scattered rhonchi


Abd: soft, nontender


Ext: no edema





 CBC, BMP





 01/23/18 07:30 





 01/23/18 07:30 





Active Medications





Acetaminophen (Tylenol -)  650 mg PO TID [facility]


   Last Admin: 01/23/18 05:59 Dose:  650 mg


Albuterol Sulfate (Ventolin 0.083% Nebulizer Soln -)  1 amp NEB Q4H PRN


   PRN Reason: SHORT OF BREATH/WHEEZING


Albuterol/Ipratropium (Duoneb -)  1 amp NEB RQID [facility]


   Last Admin: 01/23/18 11:43 Dose:  1 amp


Amlodipine Besylate (Norvasc -)  2.5 mg PO DAILY [facility]


   Last Admin: 01/23/18 11:18 Dose:  2.5 mg


Celecoxib (Celebrex -)  100 mg PO BID [facility]


   Last Admin: 01/23/18 11:17 Dose:  100 mg


Collagenase (Santyl -)  1 applic TP DAILY [facility]


   Last Admin: 01/23/18 11:30 Dose:  1 applic


Docusate Sodium (Colace Liquid -)  300 mg PO DAILY [facility]


   Last Admin: 01/23/18 11:18 Dose:  300 mg


Gabapentin (Neurontin -)  400 mg PO BID [facility]


   Last Admin: 01/23/18 11:17 Dose:  400 mg


Heparin Sodium (Porcine) (Heparin -)  5,000 unit SQ BID [facility]


   Last Admin: 01/23/18 11:18 Dose:  5,000 unit


Hydromorphone HCl (Dilaudid -)  2 mg PO HS PRN


   PRN Reason: PAIN LEVEL 6-10


Hydromorphone HCl (Dilaudid -)  4 mg PO Q4H PRN


   PRN Reason: PAIN LEVEL 4 - 6


   Last Admin: 01/23/18 11:21 Dose:  4 mg


Dextrose/Sodium Chloride (D5-Ns -)  1,000 mls @ 50 mls/hr IV ASDIR [facility]


   Last Admin: 01/22/18 16:45 Dose:  0 mls


Vancomycin HCl 1,000 mg/ (Dextrose)  250 mls @ 166.667 mls/hr IVPB DAILY@1800 

[facility]


   PRN Reason: Protocol


   Last Admin: 01/22/18 18:33 Dose:  166.667 mls/hr


Lactobacillus Acidophilus (Bacid -)  2 tab PO DAILY [facility]


   Last Admin: 01/23/18 11:17 Dose:  2 tab


Multivitamins/Minerals/Vitamin C (Tab-A-Vit -)  1 tab PO DAILY [facility]


   Last Admin: 01/23/18 11:20 Dose:  1 tab


Ondansetron HCl (Zofran -)  4 mg PO TID [facility]


   Last Admin: 01/23/18 05:59 Dose:  4 mg


Ondansetron HCl (Zofran Injection)  4 mg IVPUSH Q6H PRN


   PRN Reason: NAUSEA AND/OR VOMITING


Oxycodone HCl (Roxicodone -)  5 mg PO Q4H PRN


   PRN Reason: Pain Level > 4


   Stop: 01/23/18 15:27


Promethazine HCl (Phenergan Injection -)  12.5 mg IVPB Q6H PRN


   PRN Reason: NAUSEA-FOR RESCUE AFTER 15 MIN


Ranitidine HCl (Zantac -)  150 mg PO DAILY [facility]


   Last Admin: 01/23/18 11:17 Dose:  150 mg


Senna (Senna -)  2 tab PO HS [facility]


   Last Admin: 01/22/18 22:10 Dose:  2 tab


Simethicone (Mylicon -)  160 mg PO QID [facility]


   Last Admin: 01/23/18 11:18 Dose:  160 mg





A/P


Osteomyelitis


r/o Acute Bronchospasm





-  continue standing nebulizers


-  will start short course of medrol x 48 hrs and then reassess


-  O2 to keep SpO2 >90%


-  incentive spirometry


-  antibiotics per ID


-  outpt PFTs


-  DVT prophylaxis
Progress Note (short form)





- Note


Progress Note: 





PULMONARY





VSS/AFEBRILE


PALE/POOR DENTITION


ANICTERIC


CLEAR LUNG BOB


S1S2


BS+


RIGHT LOWER EXT BANDAGED





MEDS/LABS/IMAGES/MICRO/NOTES REVIEWED








(1) Bronchospasm


Code(s): J98.01 - ACUTE BRONCHOSPASM   





(2) Cough


Code(s): R05 - COUGH   





(3) Osteomyelitis


Code(s): M86.9 - OSTEOMYELITIS, UNSPECIFIED   





Assessment/Plan





A/P


Osteomyelitis/antibiotics


Resp status stable


bronchodilators


taper steroids as outpatient





ZEE REESE MD
Progress Note (short form)





- Note


Progress Note: 





Podiatry F/U:





Seen/evaluated at bedside, NAD.  Persistent pain to R foot.  Denies F/V/N/C/SOB/

CP.  Afebrile, VSS.  S/p R foot debridement of ulcer with bone biopsy.





MONY:





R foot: dorsal rearfoot pressure ulcer stage 4 with cancellous bone exposed, 

decorticated bone present, no purulent drainage, no fluctuance, no periwound 

erythema, no ascending cellulitis, no signs of active infection.  Significant 

tenderness to palpation.





WBC: 13.4





Bone Cx: MRSA, corynebacterium





Imp: 86 year old F s/p R foot debridement and bone biopsy for stage 4 pressure 

ulcer


1. IV abx per ID.  Bone cx (+) MRSA


2. Will need treatment for osteomyelitis


3. Posterior splint applied to RLE, some dorsiflexion achieved


4. Rx bactroban QOD


5. Upon discharge will need f/u in wound healing center.  Will need fixed AFO 

for her severe dropfoot.





RAVEN Villa DPM
Progress Note (short form)





- Note


Progress Note: 





Podiatry F/U:





Seen/evaluated at bedside, NAD.  Significant persistent pain to R foot/ankle, 

especially on range of motion.  Denies F/V/N/C/SOB/CP.  S/p R foot debridement 

with bone biopsy POD #1.  Afebrile, VSS.





MONY:





R foot: anterior rearfoot pressure ulcer stage 4 with exposed talar body, 

cancellous bone present, no active bleeding noted, no purulent drainage, no 

fluctuance, no periwound erythema, no ascending cellulitis, no active signs of 

infection.  Significant tenderness to palpation.





WBC: 7.7





OR Cx: pending





Imp: 86 year old F with R foot pressure ulcer stage 4, osteomyelitis


1. C/w IV abx per Infectious Disease


2. Non-adherent dressing + DSD applied to R foot


3. F/u OR cultures


4. Will need wound vac with white foam applied 3x/week upon discharge


5. Pain control


6.  Patient still has significant subluxation of foot with instability.  

Guarded prognosis going forward.





RAVEN Villa DPM
Progress Note (short form)





- Note


Progress Note: 





Podiatry Pre-op:





Seen/evaluated at bedside in holding.  Risks, benefits, alternatives to sx 

discussed at length with patient.  All questions answered, informed consent 

obtained.  Plan for R foot debridement, bone biopsy.





RAVEN Villa DPM
Progress Note (short form)





- Note


Progress Note: 





Podiatry:





Seen/evaluated at bedside, NAD.  Has persistent pain to R dorsal foot.  Denies F

/V/N/C/SOB/CP. AFebrile, VSS.  Awaiting MRI R foot/ankle.





MONY:





R foot: dorsal lateral pressure ulcer, midfoot/rearfoot, unstageable, necrotic 

cap with underlying fibrotic base, probes deep, moderate periwound erythema, no 

purulent drainage, no fluctuance, no soft tissue crepitus, no streaking 

cellulitis.  Significant tenderness to palpation.





Wound Cx: pending


Blood Cx: no growth x 24 hrs





WBC: 5.5


ESR: 97





R foot XR: no radiographic evidence of OM


R Foot/ankle MRI: pending





Imp: 86 year old F with R dorsal rearfoot pressure ulcer unstageable, eval for 

osteomyelitis


1. Abx on hold per ID


2. Continue local wound care


3. Awaiting MRI.  If (+) will consider bone biopsy


4. Will follow





RAVEN Villa DPM
Progress Note (short form)





- Note


Progress Note: 





Podiatry:





Seen/evaluated at bedside, NAD.  Pain persistent to R anterior foot/ankle.  

Denies F/V/N/C/SOB/CP.  AFebrile, VSS.





MONY:





R foot: dorsolateral rearfoot pressure ulcer stage 4 with exposed bone, exposed 

extensor tendon, surrounding granular tissue, no purulence, no fluctuance.  

Moderate periwound erythema, no ascending cellulitis, no SOIs





R foot MRI: bone marrow edema lateral talar body suspicious for osteomyelitis.  

Subluxed talus.





Imp: 86 year old F with R foot pressure ulcer stage 4, evaluate for 

osteomyelitis


1. C/w santyl DSD R foot


2. Offloading measures


3. Will need bone biopsy given (+) MRI findings.  Plan for OR debridement and 

bone biopsy Monday.  Given subluxed talus, really needs Orthopedic consult as 

outpatient for external fixation frame to realign rearfoot/ankle.  


4. Will follow





RAVEN Villa DPM
Progress Note (short form)





- Note


Progress Note: 





Podiatry:





Seen/evaluated at bedside, NAD.  Persistent pain to R foot.  Denies F/V/N/C/SOB/

CP.  Afebrile, VSS.  Off IV abx now in anticipation of biopsy.





MONY:





R foot: dorsal midfoot pressure ulcer stage 4 with exposed talus, fibrotic rim, 

no purulent drainage, no fluctuance, no ascending cellulitis, no signs of acute 

infection.  Moderate periwound erythema.  Significant tenderness to palpation.  

Fixed equinus of foot at level of rearfoot.





WBC: 6.4





Wound Cx: MRSA





MRI R foot: focal BME anterolateral talar dome suggestive of osteomyelitis





Imp: 86 year old F with R foot pressure ulcer stage 4 and osteomyelitis


1. NPO at midnight.  For OR debridement and bone biopsy tomorrow early PM.  

Risks, benefits alternatives to surgery discussed at length.


2. Will follow.





RAVEN Villa DPM
Progress Note (short form)





- Note


Progress Note: 





Pt is day#1 s/p debridement/biopsy of right foot under MAC.  No anesthetic 

issues/complications; continue current care.
Progress Note (short form)





- Note


Progress Note: 





Vascular Surgery 





Pt seen and examined in C yest. 


Pt was then sent in for admission for right ankle 


ulcer that probes to bone. 


Podiatry on case along with ID


MRI ordered to rule out osteo. 


Santyl to all wounds daily. 





Avila dallas DO
Progress Note (short form)





- Note


Progress Note: 





alert


nad


cough


no fever


 Vital Signs











 Period  Temp  Pulse  Resp  BP Sys/Hoskins  Pulse Ox


 


 Last 24 Hr  97.6 F-99.2 F  69-83  18-20  /62-76  95-95








cor-rrr


lungs scattered rhonchi


abd soft,nt


ext foot bandaged








cxray no infiltrate


mri probable osteo








 CBC, BMP





 01/22/18 07:15 





 01/22/18 07:15 








 Microbiology





01/15/18 19:05   Blood - Peripheral Venous   Blood Culture - Final


                            NO GROWTH AFTER 5 DAYS INCUBATION


01/15/18 18:40   Blood - Peripheral Venous   Blood Culture - Final


                            NO GROWTH AFTER 5 DAYS INCUBATION


01/15/18 16:10   Ankle - Right   Gram Stain - Final


01/15/18 16:10   Ankle - Right   Wound Culture - Preliminary


                            Mr S Aureus


                            Diphtheroid/Corynebacterium





 





a/p


osteomyelitis


failed one month of po bactrim


Bone biopsy (failed bactrim) 


?need for debridement


podiatry consult noted


for bone biopsy





will start vancomycin after bone biopsy





Problem List





- Problems


(1) Osteomyelitis


Code(s): M86.9 - OSTEOMYELITIS, UNSPECIFIED   





(2) Ulcer of right ankle


Code(s): L97.319 - NON-PRESSURE CHRONIC ULCER OF RIGHT ANKLE WITH UNSP SEVERITY
Progress Note (short form)





- Note


Progress Note: 





alert


nad


dry cough


s/p biopsy





 Vital Signs











 Period  Temp  Pulse  Resp  BP Sys/Hoskins  Pulse Ox


 


 Last 24 Hr  97.8 F-98.3 F    20-21  130-140/65-78  96








cor-rrr


lungs decreased bs at bases


abd soft,nt


ext dressing intact 








cxray no infiltrate


mri probable osteo, but exposed bone








 


 CBC, BMP





 01/24/18 06:30 





 01/24/18 06:30 





 Microbiology





01/22/18 15:44   Bone   Gram Stain - Final


01/22/18 15:44   Biopsy Tissue   Gram Stain - Final


01/15/18 16:10   Ankle - Right   Gram Stain - Final


01/15/18 16:10   Ankle - Right   Wound Culture - Final


                            Mr S Aureus


                            Diphtheroid/Corynebacterium


01/15/18 19:05   Blood - Peripheral Venous   Blood Culture - Final


                            NO GROWTH AFTER 5 DAYS INCUBATION


01/15/18 18:40   Blood - Peripheral Venous   Blood Culture - Final


                            NO GROWTH AFTER 5 DAYS INCUBATION








 





a/p


osteomyelitis


failed one month of po bactrim


Bone biopsy (failed bactrim) 


?need for debridement


podiatry consult noted


for bone biopsy


continue vancomycin


f/u bone biopsy


probable d/c to NH with picc line on vancomycin 





Problem List





- Problems


(1) Osteomyelitis


Code(s): M86.9 - OSTEOMYELITIS, UNSPECIFIED   





(2) Ulcer of right ankle


Code(s): L97.319 - NON-PRESSURE CHRONIC ULCER OF RIGHT ANKLE WITH UNSP SEVERITY
Progress Note (short form)





- Note


Progress Note: 





alert


nad


wounds on legs are quite painful





 Vital Signs











 Period  Temp  Pulse  Resp  BP Sys/Hoskins  Pulse Ox


 


 Last 24 Hr  97.6 F-98.9 F  65-81  18-20  112-143/56-75  94-95








cor-rrr


lungs clear


abd soft,nt


ext


wounds examined- right ankle wound no erythema no drainage


left calf wound 





mri probable osteo





 CBC, BMP





 01/18/18 07:30 





 01/18/18 07:30 





 Microbiology





01/15/18 16:10   Ankle - Right   Gram Stain - Final


01/15/18 16:10   Ankle - Right   Wound Culture - Preliminary


                            Staphylococcus Latex Coag Pos


                            Diphtheroid/Corynebacterium


01/15/18 19:05   Blood - Peripheral Venous   Blood Culture - Preliminary


                            NO GROWTH OBTAINED AFTER 48 HOURS, INCUBATION TO 

CONTINUE


                            FOR 3 DAYS.


01/15/18 18:40   Blood - Peripheral Venous   Blood Culture - Preliminary


                            NO GROWTH OBTAINED AFTER 48 HOURS, INCUBATION TO 

CONTINUE


                            FOR 3 DAYS.





a/p


osteomyelitis


failed one month of po bactrim


Bone biopsy (failed bactrim) 


?need for debridement


podiatry consult noted


for bone biopsy Monday








hold antibiotics for now- hopefully biopsy off antibiotics





Problem List





- Problems


(1) Osteomyelitis


Code(s): M86.9 - OSTEOMYELITIS, UNSPECIFIED   





(2) Ulcer of right ankle


Code(s): L97.319 - NON-PRESSURE CHRONIC ULCER OF RIGHT ANKLE WITH UNSP SEVERITY
Progress Note (short form)





- Note


Progress Note: 





cough ilmlproved





 


 Vital Signs











 Period  Temp  Pulse  Resp  BP Sys/Hoskins  Pulse Ox


 


 Last 24 Hr  97.6 F-98.4 F    20-20  122-140/59-78  97-97








cor-rrr


lungs clear


abd soft,nt


ext +dressing right foot





 CBC, BMP





 01/26/18 06:30 





 01/26/18 06:30 





 Microbiology





01/22/18 15:44   Biopsy Tissue   Gram Stain - Final


01/22/18 15:44   Biopsy Tissue   Tissue Culture - Final


                            Mr S Aureus


01/22/18 15:44   Biopsy Tissue   Anaerobic Culture - Final


                            NO ANAEROBES WERE ISOLATED


01/22/18 15:44   Bone   Gram Stain - Final


01/22/18 15:44   Bone   Tissue Culture - Final


                            Mr S Aureus


                            Corynebacterium Striatum


01/22/18 15:44   Bone   Anaerobic Culture - Final


                            NO ANAEROBES WERE ISOLATED


01/15/18 16:10   Ankle - Right   Gram Stain - Final


01/15/18 16:10   Ankle - Right   Wound Culture - Final


                            Mr S Aureus


                            Diphtheroid/Corynebacterium


01/15/18 19:05   Blood - Peripheral Venous   Blood Culture - Final


                            NO GROWTH AFTER 5 DAYS INCUBATION


01/15/18 18:40   Blood - Peripheral Venous   Blood Culture - Final


                            NO GROWTH AFTER 5 DAYS INCUBATION








 





a/p


osteomyelitis


failed one month of po bactrim


Bone biopsy noted- MRSA osteo


picc line and vancomycin for 6 weeks


follow vanco levels and esr/crp in NH


wound care f/u of foot


d/w Dr Andujar


d/w patient- she is agreeable


will order picc line











Problem List





- Problems


(1) Osteomyelitis


Code(s): M86.9 - OSTEOMYELITIS, UNSPECIFIED   





(2) Ulcer of right ankle


Code(s): L97.319 - NON-PRESSURE CHRONIC ULCER OF RIGHT ANKLE WITH UNSP SEVERITY
Progress Note (short form)





- Note


Progress Note: 





imp/reccd





86 year old female with worsening nonhealing ankle ulcer right foot


d/w Dr Hoyos and Dr Andujar


she has been on about one month of PO bactrim without any improvement- wound 

culture 12/5 with MRSA





apparently wound now probes to bone





suggest


probable osteomyelitis/chronic foot wound


MRI 


Bone biopsy (failed bactrim) 


?need for debridement


podiatry consult





esr/crp noted





hold antibiotics for now- hopefully biopsy off antibiotics





Problem List





- Problems


(1) Osteomyelitis


Code(s): M86.9 - OSTEOMYELITIS, UNSPECIFIED   





(2) Ulcer of right ankle


Code(s): L97.319 - NON-PRESSURE CHRONIC ULCER OF RIGHT ANKLE WITH UNSP SEVERITY
Progress Note, Physician


History of Present Illness: 





Pt is upset this AM, complaining to me that she had foot pain and I didn't 

answer to nurse calls yesterday. 


Pt told me that is "disgusted by my attitude" and doesn't wants to talk to me 

and is refusing to be seen. 





- Current Medication List


Current Medications: 


Active Medications





Acetaminophen (Tylenol -)  650 mg PO TID [facility]


   Last Admin: 01/19/18 06:38 Dose:  650 mg


Amlodipine Besylate (Norvasc -)  2.5 mg PO DAILY [facility]


   Last Admin: 01/18/18 11:08 Dose:  Not Given


Celecoxib (Celebrex -)  100 mg PO BID [facility]


   Last Admin: 01/18/18 21:56 Dose:  100 mg


Collagenase (Santyl -)  1 applic TP DAILY [facility]


   Last Admin: 01/18/18 13:44 Dose:  1 applic


Docusate Sodium (Colace Liquid -)  300 mg PO DAILY [facility]


   Last Admin: 01/18/18 11:10 Dose:  Not Given


Gabapentin (Neurontin -)  400 mg PO BID [facility]


   Last Admin: 01/18/18 21:56 Dose:  400 mg


Heparin Sodium (Porcine) (Heparin -)  5,000 unit SQ BID [facility]


   Last Admin: 01/18/18 21:57 Dose:  5,000 unit


Lactobacillus Acidophilus (Bacid -)  2 tab PO DAILY [facility]


   Last Admin: 01/18/18 10:58 Dose:  2 tab


Morphine Sulfate (Morphine Injection -)  4 mg IVPUSH Q4H PRN


   PRN Reason: PAIN LEVEL 4 - 6


   Last Admin: 01/19/18 06:38 Dose:  4 mg


Multivitamins/Minerals/Vitamin C (Tab-A-Vit -)  1 tab PO DAILY [facility]


   Last Admin: 01/18/18 10:57 Dose:  1 tab


Ondansetron HCl (Zofran -)  4 mg PO TID [facility]


   Last Admin: 01/19/18 06:38 Dose:  4 mg


Ranitidine HCl (Zantac -)  150 mg PO DAILY [facility]


   Last Admin: 01/18/18 10:57 Dose:  150 mg


Senna (Senna -)  2 tab PO HS [facility]


   Last Admin: 01/18/18 22:03 Dose:  Not Given


Simethicone (Mylicon -)  160 mg PO QID [facility]


   Last Admin: 01/18/18 21:57 Dose:  80 mg











- Objective


Vital Signs: 


 Vital Signs











Temperature  98.5 F   01/18/18 22:07


 


Pulse Rate  68   01/18/18 22:07


 


Respiratory Rate  18   01/18/18 22:07


 


Blood Pressure  119/60   01/18/18 22:07


 


O2 Sat by Pulse Oximetry (%)  95   01/18/18 21:00











Labs: 


 CBC, BMP





 01/18/18 07:30 





 01/19/18 07:00 











Problem List





- Problems


(1) Ulcer of right ankle


Code(s): L97.319 - NON-PRESSURE CHRONIC ULCER OF RIGHT ANKLE WITH UNSP SEVERITY

   





(2) Bilateral leg ulcer


Code(s): L97.919 - NON-PRS CHRONIC ULC UNSP PRT OF R LOW LEG W UNSP SEVERITY; 

L97.929 - NON-PRS CHRONIC ULC UNSP PRT OF L LOW LEG W UNSP SEVERITY   





(3) PVD (peripheral vascular disease)


Code(s): I73.9 - PERIPHERAL VASCULAR DISEASE, UNSPECIFIED   





(4) CVA (cerebral vascular accident)


Code(s): I63.9 - CEREBRAL INFARCTION, UNSPECIFIED   





(5) Hypertension


Code(s): I10 - ESSENTIAL (PRIMARY) HYPERTENSION   





Assessment/Plan








ID, Vasc SX, Podiatry consults appreciated.


Pending foot Bx. 


ECHO report is pending; unclear if pt needs local or general anesthesia.


Probable pt would need PICC line and long term abtx treatment.


I spoke with  pt's today nurse, [facility] nursing supervisor and CM about pt's 

complains; none are aware of pt c/o uncontrolled pain, about nurse trying to 

get in touch with me yesterday or last night. 


I called pt's health coordinator and left message to call me back (my office 

phone # was provided); pt's nurse is aware.


AM labs
Progress Note, Physician


History of Present Illness: 





Pt w right ankle pain controlled by pain medication. 


Pt with dry cough is unchanged.


Pt w/o fever, chills, SOB, CP, abd pain, diarrhea.





- Current Medication List


Current Medications: 


Active Medications





Acetaminophen (Tylenol -)  650 mg PO TID [facility]


   Last Admin: 01/22/18 05:43 Dose:  Not Given


Albuterol Sulfate (Ventolin 0.083% Nebulizer Soln -)  1 amp NEB Q4H PRN


   PRN Reason: SHORT OF BREATH/WHEEZING


Albuterol/Ipratropium (Duoneb -)  1 amp NEB RQID [facility]


Amlodipine Besylate (Norvasc -)  2.5 mg PO DAILY [facility]


   Last Admin: 01/22/18 10:05 Dose:  2.5 mg


Celecoxib (Celebrex -)  100 mg PO BID [facility]


   Last Admin: 01/21/18 22:01 Dose:  100 mg


Collagenase (Santyl -)  1 applic TP DAILY [facility]


   Last Admin: 01/21/18 10:48 Dose:  1 applic


Docusate Sodium (Colace Liquid -)  300 mg PO DAILY [facility]


   Last Admin: 01/21/18 10:44 Dose:  300 mg


Gabapentin (Neurontin -)  400 mg PO BID [facility]


   Last Admin: 01/21/18 22:01 Dose:  400 mg


Heparin Sodium (Porcine) (Heparin -)  5,000 unit SQ BID [facility]


   Last Admin: 01/22/18 10:02 Dose:  Not Given


Hydromorphone HCl (Dilaudid -)  2 mg PO HS PRN


   PRN Reason: PAIN LEVEL 6-10


   Last Admin: 01/21/18 22:00 Dose:  2 mg


Hydromorphone HCl (Dilaudid -)  4 mg PO Q4H PRN


   PRN Reason: PAIN LEVEL 4 - 6


   Last Admin: 01/22/18 10:06 Dose:  4 mg


Dextrose/Sodium Chloride (D5-Ns -)  1,000 mls @ 50 mls/hr IV ASDIR [facility]


   Last Admin: 01/22/18 00:02 Dose:  50 mls/hr


Vancomycin HCl 1,000 mg/ (Dextrose)  250 mls @ 166.667 mls/hr IVPB DAILY@1800 

[facility]


   PRN Reason: Protocol


Lactated Ringer's (Lactated Ringers Solution)  1,000 mls @ 75 mls/hr IV ASDIR 

[facility]


Lactobacillus Acidophilus (Bacid -)  2 tab PO DAILY [facility]


   Last Admin: 01/21/18 10:45 Dose:  2 tab


Multivitamins/Minerals/Vitamin C (Tab-A-Vit -)  1 tab PO DAILY [facility]


   Last Admin: 01/21/18 10:45 Dose:  1 tab


Ondansetron HCl (Zofran -)  4 mg PO TID [facility]


   Last Admin: 01/22/18 05:43 Dose:  Not Given


Ondansetron HCl (Zofran Injection)  4 mg IVPUSH Q6H PRN


   PRN Reason: NAUSEA AND/OR VOMITING


Oxycodone HCl (Roxicodone -)  5 mg PO Q4H PRN


   PRN Reason: Pain Level > 4


   Stop: 01/23/18 15:27


Promethazine HCl (Phenergan Injection -)  12.5 mg IVPB Q6H PRN


   PRN Reason: NAUSEA-FOR RESCUE AFTER 15 MIN


Ranitidine HCl (Zantac -)  150 mg PO DAILY [facility]


   Last Admin: 01/21/18 10:44 Dose:  150 mg


Senna (Senna -)  2 tab PO HS [facility]


   Last Admin: 01/21/18 22:01 Dose:  Not Given


Simethicone (Mylicon -)  160 mg PO QID [facility]


   Last Admin: 01/21/18 22:01 Dose:  160 mg











- Objective


Vital Signs: 


 Vital Signs











Temperature  97.3 F L  01/22/18 15:24


 


Pulse Rate  70   01/22/18 16:10


 


Respiratory Rate  16   01/22/18 16:10


 


Blood Pressure  122/63   01/22/18 16:10


 


O2 Sat by Pulse Oximetry (%)  98   01/22/18 16:10











Constitutional: Yes: No Distress, Calm


Cardiovascular: Yes: Regular Rate and Rhythm, S1, S2


Respiratory: Yes: Regular, Rhonchi


Gastrointestinal: Yes: Normal Bowel Sounds, Soft.  No: Tenderness


Extremities: Yes: Other (clean right ankle dressing)


Edema: No


Neurological: Yes: Alert, Oriented


Labs: 


 CBC, BMP





 01/22/18 07:15 





 01/22/18 07:15 





 INR, PTT











INR  1.16  (0.82-1.09)  H  01/22/18  07:15    














Problem List





- Problems


(1) Ulcer of right ankle


Code(s): L97.319 - NON-PRESSURE CHRONIC ULCER OF RIGHT ANKLE WITH UNSP SEVERITY

   





(2) Bilateral leg ulcer


Code(s): L97.919 - NON-PRS CHRONIC ULC UNSP PRT OF R LOW LEG W UNSP SEVERITY; 

L97.929 - NON-PRS CHRONIC ULC UNSP PRT OF L LOW LEG W UNSP SEVERITY   





(3) PVD (peripheral vascular disease)


Code(s): I73.9 - PERIPHERAL VASCULAR DISEASE, UNSPECIFIED   





(4) CVA (cerebral vascular accident)


Code(s): I63.9 - CEREBRAL INFARCTION, UNSPECIFIED   





(5) Hypertension


Code(s): I10 - ESSENTIAL (PRIMARY) HYPERTENSION   





(6) Hypoalbuminemia


Code(s): E88.09 - OTH DISORDERS OF PLASMA-PROTEIN METABOLISM, NEC   





(7) Anemia


Code(s): D64.9 - ANEMIA, UNSPECIFIED   





(8) Osteomyelitis


Code(s): M86.9 - OSTEOMYELITIS, UNSPECIFIED   





(9) Cough


Code(s): R05 - COUGH   





Assessment/Plan








ID, Vasc SX, Podiatry consults appreciated.


Pending foot Bx today. 


ECHO report was reviewed.


Probable pt would need PICC line and long term abtx treatment. 


CXR noticed.


Pulmonary consult for dry cough evaluation, in a patient w/o Hx/o smoking/ COPD

/ Emphysema/ Asthma/ not on ACEI/ not on ARB.


Case was reviewed with pt's nurse
Progress Note, Physician


History of Present Illness: 





Pt w right ankle pain controlled by pain medication. 


Pt with dry cough.


Pt w/o fever, chills, SOB, CP, abd pain, diarrhea, constipation.





- Current Medication List


Current Medications: 


Active Medications





Acetaminophen (Tylenol -)  650 mg PO TID [facility]


   Last Admin: 01/21/18 06:26 Dose:  650 mg


Amlodipine Besylate (Norvasc -)  2.5 mg PO DAILY [facility]


   Last Admin: 01/21/18 10:45 Dose:  2.5 mg


Celecoxib (Celebrex -)  100 mg PO BID [facility]


   Last Admin: 01/21/18 10:46 Dose:  100 mg


Collagenase (Santyl -)  1 applic TP DAILY [facility]


   Last Admin: 01/21/18 10:48 Dose:  1 applic


Docusate Sodium (Colace Liquid -)  300 mg PO DAILY [facility]


   Last Admin: 01/21/18 10:44 Dose:  300 mg


Gabapentin (Neurontin -)  400 mg PO BID [facility]


   Last Admin: 01/21/18 10:45 Dose:  400 mg


Heparin Sodium (Porcine) (Heparin -)  5,000 unit SQ BID [facility]


   Last Admin: 01/21/18 10:44 Dose:  5,000 unit


Lactobacillus Acidophilus (Bacid -)  2 tab PO DAILY [facility]


   Last Admin: 01/21/18 10:45 Dose:  2 tab


Morphine Sulfate (Morphine Injection -)  4 mg IVPUSH Q4H PRN


   PRN Reason: PAIN LEVEL 4 - 6


   Last Admin: 01/21/18 12:34 Dose:  4 mg


Multivitamins/Minerals/Vitamin C (Tab-A-Vit -)  1 tab PO DAILY [facility]


   Last Admin: 01/21/18 10:45 Dose:  1 tab


Ondansetron HCl (Zofran -)  4 mg PO TID [facility]


   Last Admin: 01/21/18 06:26 Dose:  4 mg


Ranitidine HCl (Zantac -)  150 mg PO DAILY [facility]


   Last Admin: 01/21/18 10:44 Dose:  150 mg


Senna (Senna -)  2 tab PO HS [facility]


   Last Admin: 01/20/18 22:11 Dose:  2 tab


Simethicone (Mylicon -)  160 mg PO QID [facility]


   Last Admin: 01/21/18 10:45 Dose:  160 mg











- Objective


Vital Signs: 


 Vital Signs











Temperature  98.4 F   01/21/18 08:50


 


Pulse Rate  82   01/21/18 08:50


 


Respiratory Rate  20   01/21/18 08:50


 


Blood Pressure  133/77   01/21/18 08:50


 


O2 Sat by Pulse Oximetry (%)  95   01/20/18 21:00











Constitutional: Yes: No Distress, Calm


Cardiovascular: Yes: Regular Rate and Rhythm, S1, S2


Respiratory: Yes: Regular, Rales (scattered, bilaterally)


Gastrointestinal: Yes: Normal Bowel Sounds, Soft.  No: Tenderness


Extremities: Yes: Other (clean right ankle dressing)


Edema: No


Labs: 


 CBC, BMP





 01/20/18 08:00 





 01/20/18 08:00 











Problem List





- Problems


(1) Ulcer of right ankle


Code(s): L97.319 - NON-PRESSURE CHRONIC ULCER OF RIGHT ANKLE WITH UNSP SEVERITY

   





(2) Bilateral leg ulcer


Code(s): L97.919 - NON-PRS CHRONIC ULC UNSP PRT OF R LOW LEG W UNSP SEVERITY; 

L97.929 - NON-PRS CHRONIC ULC UNSP PRT OF L LOW LEG W UNSP SEVERITY   





(3) PVD (peripheral vascular disease)


Code(s): I73.9 - PERIPHERAL VASCULAR DISEASE, UNSPECIFIED   





(4) CVA (cerebral vascular accident)


Code(s): I63.9 - CEREBRAL INFARCTION, UNSPECIFIED   





(5) Hypertension


Code(s): I10 - ESSENTIAL (PRIMARY) HYPERTENSION   





(6) Hypoalbuminemia


Code(s): E88.09 - OTH DISORDERS OF PLASMA-PROTEIN METABOLISM, NEC   





(7) Anemia


Code(s): D64.9 - ANEMIA, UNSPECIFIED   





(8) Osteomyelitis


Code(s): M86.9 - OSTEOMYELITIS, UNSPECIFIED   





(9) Cough


Code(s): R05 - COUGH   





Assessment/Plan








ID, Vasc SX, Podiatry consults appreciated.


Pending foot Bx on Monday. 


ECHO report was reviewed.


Probable pt would need PICC line and long term abtx treatment. 


CXR


Pulmonary consult for dry cough evaluation, in a patient w/o Hx/o smoking/ COPD

/ Emphysema/ Asthma/ not on ACEI/ not on ARB.


Case was reviewed with pt's nurse
Progress Note, Physician


History of Present Illness: 





Pt w right ankle pain, controlled by pain medication. 


Pt with dry cough, better with nebulizer.


Pt w/o fever, chills, SOB, CP, abd pain, diarrhea.





- Current Medication List


Current Medications: 


Active Medications





Acetaminophen (Tylenol -)  650 mg PO TID [facility]


   Last Admin: 01/24/18 06:31 Dose:  650 mg


Albuterol Sulfate (Ventolin 0.083% Nebulizer Soln -)  1 amp NEB Q4H PRN


   PRN Reason: SHORT OF BREATH/WHEEZING


Albuterol/Ipratropium (Duoneb -)  1 amp NEB RQID [facility]


   Last Admin: 01/24/18 07:40 Dose:  1 amp


Amlodipine Besylate (Norvasc -)  2.5 mg PO DAILY [facility]


   Last Admin: 01/23/18 11:18 Dose:  2.5 mg


Celecoxib (Celebrex -)  100 mg PO BID [facility]


   Last Admin: 01/23/18 21:38 Dose:  100 mg


Collagenase (Santyl -)  1 applic TP DAILY [facility]


   Last Admin: 01/23/18 11:30 Dose:  1 applic


Docusate Sodium (Colace -)  300 mg PO DAILY [facility]


   Last Admin: 01/23/18 13:41 Dose:  300 mg


Gabapentin (Neurontin -)  400 mg PO BID [facility]


   Last Admin: 01/23/18 21:33 Dose:  400 mg


Heparin Sodium (Porcine) (Heparin -)  5,000 unit SQ BID [facility]


   Last Admin: 01/23/18 21:34 Dose:  5,000 unit


Hydromorphone HCl (Dilaudid -)  2 mg PO HS PRN


   PRN Reason: PAIN LEVEL 6-10


Hydromorphone HCl (Dilaudid -)  4 mg PO Q4H PRN


   PRN Reason: PAIN LEVEL 4 - 6


   Last Admin: 01/24/18 06:34 Dose:  4 mg


Dextrose/Sodium Chloride (D5-Ns -)  1,000 mls @ 50 mls/hr IV ASDIR [facility]


   Last Admin: 01/23/18 17:24 Dose:  Not Given


Vancomycin HCl 1,000 mg/ (Dextrose)  250 mls @ 166.667 mls/hr IVPB DAILY@1800 

[place]


   PRN Reason: Protocol


   Last Admin: 01/23/18 17:24 Dose:  166.667 mls/hr


Lactobacillus Acidophilus (Bacid -)  2 tab PO DAILY [facility]


   Last Admin: 01/23/18 11:17 Dose:  2 tab


Methylprednisolone Sodium Succinate (Solu-Medrol -)  40 mg IVPUSH Q8H-IV [facility]


   Last Admin: 01/24/18 02:34 Dose:  40 mg


Multivitamins/Minerals/Vitamin C (Tab-A-Vit -)  1 tab PO DAILY [facility]


   Last Admin: 01/23/18 11:20 Dose:  1 tab


Ondansetron HCl (Zofran -)  4 mg PO TID [facility]


   Last Admin: 01/24/18 06:31 Dose:  4 mg


Ondansetron HCl (Zofran Injection)  4 mg IVPUSH Q6H PRN


   PRN Reason: NAUSEA AND/OR VOMITING


Promethazine HCl (Phenergan Injection -)  12.5 mg IVPB Q6H PRN


   PRN Reason: NAUSEA-FOR RESCUE AFTER 15 MIN


Ranitidine HCl (Zantac -)  150 mg PO DAILY [facility]


   Last Admin: 01/23/18 11:17 Dose:  150 mg


Senna (Senna -)  2 tab PO HS [facility]


   Last Admin: 01/23/18 21:38 Dose:  2 tab


Simethicone (Mylicon -)  160 mg PO QID [facility]


   Last Admin: 01/23/18 21:38 Dose:  80 mg











- Objective


Vital Signs: 


 Vital Signs











Temperature  97.8 F   01/24/18 05:59


 


Pulse Rate  83   01/24/18 05:59


 


Respiratory Rate  20   01/24/18 05:59


 


Blood Pressure  139/76   01/24/18 05:59


 


O2 Sat by Pulse Oximetry (%)  96   01/23/18 20:15











Constitutional: Yes: No Distress, Calm


Cardiovascular: Yes: Regular Rate and Rhythm, S1, S2


Respiratory: Yes: Regular, Other (coarse BS bilat.)


Gastrointestinal: Yes: Normal Bowel Sounds, Soft.  No: Tenderness


Edema: No


Neurological: Yes: Alert, Oriented


Labs: 


 CBC, BMP





 01/24/18 06:30 





 01/24/18 06:30 





 INR, PTT











INR  1.16  (0.82-1.09)  H  01/22/18  07:15    














Problem List





- Problems


(1) Ulcer of right ankle


Code(s): L97.319 - NON-PRESSURE CHRONIC ULCER OF RIGHT ANKLE WITH UNSP SEVERITY

   





(2) Bilateral leg ulcer


Code(s): L97.919 - NON-PRS CHRONIC ULC UNSP PRT OF R LOW LEG W UNSP SEVERITY; 

L97.929 - NON-PRS CHRONIC ULC UNSP PRT OF L LOW LEG W UNSP SEVERITY   





(3) PVD (peripheral vascular disease)


Code(s): I73.9 - PERIPHERAL VASCULAR DISEASE, UNSPECIFIED   





(4) CVA (cerebral vascular accident)


Code(s): I63.9 - CEREBRAL INFARCTION, UNSPECIFIED   





(5) Hypertension


Code(s): I10 - ESSENTIAL (PRIMARY) HYPERTENSION   





(6) Hypoalbuminemia


Code(s): E88.09 - [facility] DISORDERS OF PLASMA-PROTEIN METABOLISM, NEC   





(7) Anemia


Code(s): D64.9 - ANEMIA, UNSPECIFIED   





(8) Osteomyelitis


Code(s): M86.9 - OSTEOMYELITIS, UNSPECIFIED   





(9) Cough


Code(s): R05 - COUGH   





Assessment/Plan








ID, Vasc SX, Podiatry consults appreciated.


Abtx per ID -case was d/w Dr. Wallace.


Local care per Podiatry


Post op Day #2.


To f/u bone BX -to adjust abtx per CX report


s/p ECHO.


Probable pt would need PICC line and long term abtx treatment. 


Pulmonary consult appreciated.


Case was reviewed with pt's nurse.


Am labs
Progress Note, Physician


History of Present Illness: 





Pt w right ankle pain, controlled by pain medication. 


Pt with dry cough.


Pt w/o fever, chills, SOB, CP, abd pain, diarrhea.





- Current Medication List


Current Medications: 


Active Medications





Acetaminophen (Tylenol -)  650 mg PO TID [facility]


   Last Admin: 01/23/18 21:33 Dose:  650 mg


Albuterol Sulfate (Ventolin 0.083% Nebulizer Soln -)  1 amp NEB Q4H PRN


   PRN Reason: SHORT OF BREATH/WHEEZING


Albuterol/Ipratropium (Duoneb -)  1 amp NEB RQID [facility]


   Last Admin: 01/23/18 20:20 Dose:  1 amp


Amlodipine Besylate (Norvasc -)  2.5 mg PO DAILY [facility]


   Last Admin: 01/23/18 11:18 Dose:  2.5 mg


Celecoxib (Celebrex -)  100 mg PO BID [facility]


   Last Admin: 01/23/18 21:38 Dose:  100 mg


Collagenase (Santyl -)  1 applic TP DAILY [facility]


   Last Admin: 01/23/18 11:30 Dose:  1 applic


Docusate Sodium (Colace -)  300 mg PO DAILY [facility]


   Last Admin: 01/23/18 13:41 Dose:  300 mg


Gabapentin (Neurontin -)  400 mg PO BID [facility]


   Last Admin: 01/23/18 21:33 Dose:  400 mg


Heparin Sodium (Porcine) (Heparin -)  5,000 unit SQ BID [facility]


   Last Admin: 01/23/18 21:34 Dose:  5,000 unit


Hydromorphone HCl (Dilaudid -)  2 mg PO HS PRN


   PRN Reason: PAIN LEVEL 6-10


Hydromorphone HCl (Dilaudid -)  4 mg PO Q4H PRN


   PRN Reason: PAIN LEVEL 4 - 6


   Last Admin: 01/23/18 21:35 Dose:  4 mg


Dextrose/Sodium Chloride (D5-Ns -)  1,000 mls @ 50 mls/hr IV ASDIR [facility]


   Last Admin: 01/23/18 17:24 Dose:  Not Given


Vancomycin HCl 1,000 mg/ (Dextrose)  250 mls @ 166.667 mls/hr IVPB DAILY@1800 

[place]


   PRN Reason: Protocol


   Last Admin: 01/23/18 17:24 Dose:  166.667 mls/hr


Lactobacillus Acidophilus (Bacid -)  2 tab PO DAILY [facility]


   Last Admin: 01/23/18 11:17 Dose:  2 tab


Methylprednisolone Sodium Succinate (Solu-Medrol -)  40 mg IVPUSH Q8H-IV [facility]


   Last Admin: 01/23/18 17:23 Dose:  40 mg


Multivitamins/Minerals/Vitamin C (Tab-A-Vit -)  1 tab PO DAILY [facility]


   Last Admin: 01/23/18 11:20 Dose:  1 tab


Ondansetron HCl (Zofran -)  4 mg PO TID [facility]


   Last Admin: 01/23/18 21:34 Dose:  4 mg


Ondansetron HCl (Zofran Injection)  4 mg IVPUSH Q6H PRN


   PRN Reason: NAUSEA AND/OR VOMITING


Promethazine HCl (Phenergan Injection -)  12.5 mg IVPB Q6H PRN


   PRN Reason: NAUSEA-FOR RESCUE AFTER 15 MIN


Ranitidine HCl (Zantac -)  150 mg PO DAILY [facility]


   Last Admin: 01/23/18 11:17 Dose:  150 mg


Senna (Senna -)  2 tab PO HS [facility]


   Last Admin: 01/23/18 21:38 Dose:  2 tab


Simethicone (Mylicon -)  160 mg PO QID [facility]


   Last Admin: 01/23/18 21:38 Dose:  80 mg











- Objective


Vital Signs: 


 Vital Signs











Temperature  98.0 F   01/23/18 14:00


 


Pulse Rate  90   01/23/18 14:00


 


Respiratory Rate  20   01/23/18 14:00


 


Blood Pressure  140/71   01/23/18 14:00


 


O2 Sat by Pulse Oximetry (%)  96   01/23/18 20:15











Constitutional: Yes: No Distress, Calm


Cardiovascular: Yes: Regular Rate and Rhythm, S1, S2


Respiratory: Yes: Regular, Rhonchi (scattered)


Gastrointestinal: Yes: Normal Bowel Sounds, Soft, Tenderness


Extremities: Yes: Other (clean right ankle dressing)


Edema: No


Neurological: Yes: Alert, Oriented


Labs: 


 CBC, BMP





 01/23/18 07:30 





 01/23/18 07:30 





 INR, PTT











INR  1.16  (0.82-1.09)  H  01/22/18  07:15    














Problem List





- Problems


(1) Ulcer of right ankle


Code(s): L97.319 - NON-PRESSURE CHRONIC ULCER OF RIGHT ANKLE WITH UNSP SEVERITY

   





(2) Bilateral leg ulcer


Code(s): L97.919 - NON-PRS CHRONIC ULC UNSP PRT OF R LOW LEG W UNSP SEVERITY; 

L97.929 - NON-PRS CHRONIC ULC UNSP PRT OF L LOW LEG W UNSP SEVERITY   





(3) PVD (peripheral vascular disease)


Code(s): I73.9 - PERIPHERAL VASCULAR DISEASE, UNSPECIFIED   





(4) CVA (cerebral vascular accident)


Code(s): I63.9 - CEREBRAL INFARCTION, UNSPECIFIED   





(5) Hypertension


Code(s): I10 - ESSENTIAL (PRIMARY) HYPERTENSION   





(6) Hypoalbuminemia


Code(s): E88.09 - [facility] DISORDERS OF PLASMA-PROTEIN METABOLISM, NEC   





(7) Anemia


Code(s): D64.9 - ANEMIA, UNSPECIFIED   





(8) Osteomyelitis


Code(s): M86.9 - OSTEOMYELITIS, UNSPECIFIED   





(9) Cough


Code(s): R05 - COUGH   





Assessment/Plan








ID, Vasc SX, Podiatry consults appreciated.


Abtx per ID


Local care per Podiatry


Post op Day #1.


To f/u bone BX


s/p ECHO.


Probable pt would need PICC line and long term abtx treatment. 


Pulmonary consult appreciated.


Case was reviewed with pt's nurse.


Am labs
Progress Note, Physician


History of Present Illness: 





Pt w right ankle pain, controlled by pain medication. 


Pt's cough is improved.


Pt w/o fever, chills, SOB, CP, abd pain, diarrhea, constipation.





- Current Medication List


Current Medications: 


Active Medications





Acetaminophen (Tylenol -)  650 mg PO TID [facility]


   Last Admin: 01/26/18 07:43 Dose:  Not Given


Albuterol Sulfate (Ventolin 0.083% Nebulizer Soln -)  1 amp NEB Q4H PRN


   PRN Reason: SHORT OF BREATH/WHEEZING


Albuterol/Ipratropium (Duoneb -)  1 amp NEB RQID [facility]


   Last Admin: 01/26/18 07:56 Dose:  1 amp


Amlodipine Besylate (Norvasc -)  2.5 mg PO DAILY [facility]


   Last Admin: 01/26/18 09:43 Dose:  2.5 mg


Celecoxib (Celebrex -)  100 mg PO BID [facility]


   Last Admin: 01/26/18 09:43 Dose:  100 mg


Collagenase (Santyl -)  1 applic TP DAILY [facility]


   Last Admin: 01/26/18 09:56 Dose:  1 applic


Docusate Sodium (Colace -)  300 mg PO DAILY [facility]


   Last Admin: 01/26/18 09:42 Dose:  300 mg


Gabapentin (Neurontin -)  400 mg PO BID [facility]


   Last Admin: 01/26/18 09:43 Dose:  400 mg


Heparin Sodium (Porcine) (Heparin -)  5,000 unit SQ BID [facility]


   Last Admin: 01/26/18 09:44 Dose:  5,000 unit


Hydromorphone HCl (Dilaudid -)  2 mg PO HS PRN


   PRN Reason: PAIN LEVEL 6-10


   Last Admin: 01/25/18 21:32 Dose:  2 mg


Hydromorphone HCl (Dilaudid -)  4 mg PO Q3H PRN


   PRN Reason: PAIN LEVEL 4 - 6


   Last Admin: 01/26/18 08:33 Dose:  4 mg


Vancomycin HCl 1,000 mg/ (Dextrose)  250 mls @ 166.667 mls/hr IVPB DAILY@1800 

[place]


   PRN Reason: Protocol


   Last Admin: 01/25/18 21:19 Dose:  166.667 mls/hr


Lactobacillus Acidophilus (Bacid -)  2 tab PO DAILY [facility]


   Last Admin: 01/26/18 09:43 Dose:  2 tab


Methylprednisolone Sodium Succinate (Solu-Medrol -)  40 mg IVPUSH BID [facility]


   Last Admin: 01/26/18 09:44 Dose:  40 mg


Multivitamins/Minerals/Vitamin C (Tab-A-Vit -)  1 tab PO DAILY [facility]


   Last Admin: 01/26/18 09:43 Dose:  1 tab


Ondansetron HCl (Zofran -)  4 mg PO TID [facility]


   Last Admin: 01/26/18 07:43 Dose:  Not Given


Ranitidine HCl (Zantac -)  150 mg PO DAILY [facility]


   Last Admin: 01/26/18 09:43 Dose:  150 mg


Senna (Senna -)  2 tab PO HS [facility]


   Last Admin: 01/25/18 21:28 Dose:  2 tab


Simethicone (Mylicon -)  80 mg PO QID [facility]


   Last Admin: 01/26/18 09:43 Dose:  80 mg











- Objective


Vital Signs: 


 Vital Signs











Temperature  98.3 F   01/26/18 06:16


 


Pulse Rate  75   01/26/18 06:16


 


Respiratory Rate  20   01/26/18 06:16


 


Blood Pressure  140/78   01/26/18 06:16


 


O2 Sat by Pulse Oximetry (%)  97   01/25/18 21:00











Constitutional: Yes: No Distress, Calm


Cardiovascular: Yes: Regular Rate and Rhythm, S1, S2


Respiratory: Yes: Regular, Other (coarse BS).  No: Rales


Gastrointestinal: Yes: Normal Bowel Sounds, Soft.  No: Tenderness


Extremities: Yes: Other (clean right ankle dressing)


Neurological: Yes: Alert, Oriented


Labs: 


 CBC, BMP





 01/26/18 06:30 





 01/26/18 06:30 





 INR, PTT











INR  1.16  (0.82-1.09)  H  01/22/18  07:15    














Problem List





- Problems


(1) Ulcer of right ankle


Code(s): L97.319 - NON-PRESSURE CHRONIC ULCER OF RIGHT ANKLE WITH UNSP SEVERITY

   





(2) Bilateral leg ulcer


Code(s): L97.919 - NON-PRS CHRONIC ULC UNSP PRT OF R LOW LEG W UNSP SEVERITY; 

L97.929 - NON-PRS CHRONIC ULC UNSP PRT OF L LOW LEG W UNSP SEVERITY   





(3) PVD (peripheral vascular disease)


Code(s): I73.9 - PERIPHERAL VASCULAR DISEASE, UNSPECIFIED   





(4) CVA (cerebral vascular accident)


Code(s): I63.9 - CEREBRAL INFARCTION, UNSPECIFIED   





(5) Hypertension


Code(s): I10 - ESSENTIAL (PRIMARY) HYPERTENSION   





(6) Hypoalbuminemia


Code(s): E88.09 - OTH DISORDERS OF PLASMA-PROTEIN METABOLISM, NEC   





(7) Anemia


Code(s): D64.9 - ANEMIA, UNSPECIFIED   





(8) Osteomyelitis


Code(s): M86.9 - OSTEOMYELITIS, UNSPECIFIED   





(9) Cough


Code(s): R05 - COUGH   





Assessment/Plan








ID, Vasc SX, Podiatry consults appreciated.


Abtx per ID.


Local care per Podiatry.


To f/u bone BX -to adjust abtx per CX report


Pt would need PICC line and long term abtx treatment. 


Pulmonary consult appreciated- pt on short course of steroids.


s/p ECHO.


Case was reviewed with pt's nurse.


WBC is trending up, probable secondary to steroids.


ESR is improving
Progress Note, Physician


History of Present Illness: 





Pt w right ankle pain, controlled by pain medication. 


Pt's cough is improved.


Pt w/o fever, chills, SOB, CP, abd pain, diarrhea, constipation.





- Current Medication List


Current Medications: 


Active Medications





Acetaminophen (Tylenol -)  650 mg PO TID [facility]


   Last Admin: 01/25/18 05:30 Dose:  650 mg


Albuterol Sulfate (Ventolin 0.083% Nebulizer Soln -)  1 amp NEB Q4H PRN


   PRN Reason: SHORT OF BREATH/WHEEZING


Albuterol/Ipratropium (Duoneb -)  1 amp NEB RQID [facility]


   Last Admin: 01/25/18 07:40 Dose:  1 amp


Amlodipine Besylate (Norvasc -)  2.5 mg PO DAILY [facility]


   Last Admin: 01/24/18 11:51 Dose:  2.5 mg


Celecoxib (Celebrex -)  100 mg PO BID [facility]


   Last Admin: 01/24/18 21:45 Dose:  100 mg


Collagenase (Santyl -)  1 applic TP DAILY [facility]


   Last Admin: 01/24/18 12:04 Dose:  1 applic


Docusate Sodium (Colace -)  300 mg PO DAILY [facility]


   Last Admin: 01/24/18 11:53 Dose:  300 mg


Gabapentin (Neurontin -)  400 mg PO BID [facility]


   Last Admin: 01/24/18 21:05 Dose:  400 mg


Heparin Sodium (Porcine) (Heparin -)  5,000 unit SQ BID [facility]


   Last Admin: 01/24/18 21:03 Dose:  5,000 unit


Hydromorphone HCl (Dilaudid -)  2 mg PO HS PRN


   PRN Reason: PAIN LEVEL 6-10


   Last Admin: 01/24/18 21:58 Dose:  2 mg


Hydromorphone HCl (Dilaudid -)  4 mg PO Q3H PRN


   PRN Reason: PAIN LEVEL 4 - 6


   Last Admin: 01/25/18 08:49 Dose:  4 mg


Dextrose/Sodium Chloride (D5-Ns -)  1,000 mls @ 50 mls/hr IV ASDIR [facility]


   Last Admin: 01/24/18 16:36 Dose:  Not Given


Vancomycin HCl 1,000 mg/ (Dextrose)  250 mls @ 166.667 mls/hr IVPB DAILY@1800 

[place]


   PRN Reason: Protocol


   Last Admin: 01/24/18 17:14 Dose:  166.667 mls/hr


Lactobacillus Acidophilus (Bacid -)  2 tab PO DAILY [facility]


   Last Admin: 01/24/18 11:53 Dose:  2 tab


Methylprednisolone Sodium Succinate (Solu-Medrol -)  40 mg IVPUSH Q8H-IV [facility]


   Last Admin: 01/25/18 02:43 Dose:  40 mg


Multivitamins/Minerals/Vitamin C (Tab-A-Vit -)  1 tab PO DAILY [facility]


   Last Admin: 01/24/18 11:51 Dose:  1 tab


Ondansetron HCl (Zofran -)  4 mg PO TID [facility]


   Last Admin: 01/25/18 05:30 Dose:  4 mg


Ondansetron HCl (Zofran Injection)  4 mg IVPUSH Q6H PRN


   PRN Reason: NAUSEA AND/OR VOMITING


Promethazine HCl (Phenergan Injection -)  12.5 mg IVPB Q6H PRN


   PRN Reason: NAUSEA-FOR RESCUE AFTER 15 MIN


Ranitidine HCl (Zantac -)  150 mg PO DAILY [facility]


   Last Admin: 01/24/18 11:54 Dose:  150 mg


Senna (Senna -)  2 tab PO HS [facility]


   Last Admin: 01/24/18 21:09 Dose:  Not Given


Simethicone (Mylicon -)  160 mg PO QID [facility]


   Last Admin: 01/24/18 21:05 Dose:  160 mg











- Objective


Vital Signs: 


 Vital Signs











Temperature  98.4 F   01/25/18 06:00


 


Pulse Rate  66   01/25/18 06:00


 


Respiratory Rate  20   01/25/18 06:00


 


Blood Pressure  122/74   01/25/18 06:00


 


O2 Sat by Pulse Oximetry (%)  96   01/24/18 20:35











Constitutional: Yes: No Distress, Calm


Cardiovascular: Yes: Regular Rate and Rhythm, S1, S2


Respiratory: Yes: Regular, Other (coarse BS bilat).  No: Wheezes


Gastrointestinal: Yes: Normal Bowel Sounds, Soft.  No: Tenderness


Extremities: Yes: Other (clean right ankle dressing)


Neurological: Yes: Alert, Oriented


Labs: 


 CBC, BMP





 01/25/18 08:00 





 01/25/18 08:00 





 INR, PTT











INR  1.16  (0.82-1.09)  H  01/22/18  07:15    














Problem List





- Problems


(1) Ulcer of right ankle


Code(s): L97.319 - NON-PRESSURE CHRONIC ULCER OF RIGHT ANKLE WITH UNSP SEVERITY

   





(2) Bilateral leg ulcer


Code(s): L97.919 - NON-PRS CHRONIC ULC UNSP PRT OF R LOW LEG W UNSP SEVERITY; 

L97.929 - NON-PRS CHRONIC ULC UNSP PRT OF L LOW LEG W UNSP SEVERITY   





(3) PVD (peripheral vascular disease)


Code(s): I73.9 - PERIPHERAL VASCULAR DISEASE, UNSPECIFIED   





(4) CVA (cerebral vascular accident)


Code(s): I63.9 - CEREBRAL INFARCTION, UNSPECIFIED   





(5) Hypertension


Code(s): I10 - ESSENTIAL (PRIMARY) HYPERTENSION   





(6) Hypoalbuminemia


Code(s): E88.09 - OTH DISORDERS OF PLASMA-PROTEIN METABOLISM, NEC   





(7) Anemia


Code(s): D64.9 - ANEMIA, UNSPECIFIED   





(8) Osteomyelitis


Code(s): M86.9 - OSTEOMYELITIS, UNSPECIFIED   





(9) Cough


Code(s): R05 - COUGH   





Assessment/Plan








ID, Vasc SX, Podiatry consults appreciated.


Abtx per ID.


Local care per Podiatry.


To f/u bone BX -to adjust abtx per CX report


s/p ECHO.


Probable pt would need PICC line and long term abtx treatment. 


Pulmonary consult appreciated.


Case was reviewed with pt's nurse.


WBC is trending up, probable secondary to steroids.


AM labs
Progress Note, Physician


History of Present Illness: 





Pt w/o fever, chills. 


Pt with right ankle pain is controlled by medication.


Pt w/o SOB, CP, palp, abd pain.


Pt c/o constipation.





- Current Medication List


Current Medications: 


Active Medications





Acetaminophen (Tylenol -)  650 mg PO TID [facility]


   Last Admin: 01/17/18 05:45 Dose:  650 mg


Amlodipine Besylate (Norvasc -)  2.5 mg PO DAILY [facility]


   Last Admin: 01/16/18 10:37 Dose:  2.5 mg


Bacitracin (Bacitracin -)  1 applic TP DAILY [facility]


   Last Admin: 01/16/18 10:38 Dose:  1 applic


Celecoxib (Celebrex -)  100 mg PO BID [facility]


   Last Admin: 01/16/18 23:10 Dose:  100 mg


Collagenase (Santyl -)  1 applic TP DAILY [facility]


Docusate Sodium (Colace Liquid -)  300 mg PO HS [facility]


   Last Admin: 01/16/18 21:30 Dose:  Not Given


Gabapentin (Neurontin -)  400 mg PO BID [facility]


   Last Admin: 01/16/18 21:08 Dose:  400 mg


Heparin Sodium (Porcine) (Heparin -)  5,000 unit SQ BID [facility]


   Last Admin: 01/16/18 21:09 Dose:  5,000 unit


Sodium Chloride (Normal Saline -)  500 mls @ 50 mls/hr IV ASDIR [facility]


   Last Admin: 01/16/18 21:29 Dose:  50 mls/hr


Lactobacillus Acidophilus (Bacid -)  2 tab PO DAILY [facility]


   Last Admin: 01/16/18 10:38 Dose:  2 tab


Morphine Sulfate (Morphine Injection -)  4 mg IVPUSH Q4H PRN


   PRN Reason: PAIN LEVEL 4 - 6


   Last Admin: 01/16/18 20:57 Dose:  4 mg


Multivitamins/Minerals/Vitamin C (Tab-A-Vit -)  1 tab PO DAILY [facility]


   Last Admin: 01/16/18 10:37 Dose:  1 tab


Ondansetron HCl (Zofran -)  4 mg PO TID [facility]


   Last Admin: 01/17/18 05:47 Dose:  4 mg


Ranitidine HCl (Zantac -)  150 mg PO DAILY [facility]


   Last Admin: 01/16/18 10:37 Dose:  150 mg


Senna (Senna -)  1 tab PO [facility]


   Last Admin: 01/16/18 21:08 Dose:  1 tab


Simethicone (Mylicon -)  160 mg PO QID [facility]


   Last Admin: 01/16/18 21:08 Dose:  160 mg











- Objective


Vital Signs: 


 Vital Signs











Temperature  98.4 F   01/17/18 06:00


 


Pulse Rate  72   01/17/18 06:00


 


Respiratory Rate  18   01/17/18 06:00


 


Blood Pressure  122/66   01/17/18 06:00


 


O2 Sat by Pulse Oximetry (%)  95   01/16/18 21:00











Constitutional: Yes: No Distress, Calm


Cardiovascular: Yes: Regular Rate and Rhythm, S1, S2


Respiratory: Yes: Regular, CTA Bilaterally (except coarse at bases)


Gastrointestinal: Yes: Normal Bowel Sounds, Soft.  No: Tenderness


Extremities: Yes: Other (right ankle with clean dressing)


Edema: No


Neurological: Yes: Alert, Oriented


Labs: 


 CBC, BMP





 01/17/18 07:12 





 01/17/18 07:12 











Problem List





- Problems


(1) Ulcer of right ankle


Code(s): L97.319 - NON-PRESSURE CHRONIC ULCER OF RIGHT ANKLE WITH UNSP SEVERITY

   





(2) Bilateral leg ulcer


Code(s): L97.919 - NON-PRS CHRONIC ULC UNSP PRT OF R LOW LEG W UNSP SEVERITY; 

L97.929 - NON-PRS CHRONIC ULC UNSP PRT OF L LOW LEG W UNSP SEVERITY   





(3) PVD (peripheral vascular disease)


Code(s): I73.9 - PERIPHERAL VASCULAR DISEASE, UNSPECIFIED   





(4) CVA (cerebral vascular accident)


Code(s): I63.9 - CEREBRAL INFARCTION, UNSPECIFIED   





(5) Hypertension


Code(s): I10 - ESSENTIAL (PRIMARY) HYPERTENSION   





(6) Anemia


Assessment/Plan: 


to send Iron studies


Code(s): D64.9 - ANEMIA, UNSPECIFIED   





Assessment/Plan





Pending Foot MRI.


ID consult, Vasc SX. Podiatry consults appreciated.


Pt's [employer] health coordinator at bedside, plan was discussed.


Case was d/w pt's nurse and nursing supervisor.


AM labs
Progress Note, Physician


History of Present Illness: 





Pt w/o fever, chills. 


Pt with right ankle pain is controlled by medication.


Pt w/o SOB, CP, palp, abd pain.


Pt had BM.





- Current Medication List


Current Medications: 


Active Medications





Acetaminophen (Tylenol -)  650 mg PO TID [facility]


   Last Admin: 01/18/18 06:09 Dose:  650 mg


Amlodipine Besylate (Norvasc -)  2.5 mg PO DAILY [facility]


   Last Admin: 01/17/18 11:10 Dose:  Not Given


Celecoxib (Celebrex -)  100 mg PO BID [facility]


   Last Admin: 01/17/18 21:58 Dose:  100 mg


Collagenase (Santyl -)  1 applic TP DAILY [facility]


   Last Admin: 01/17/18 16:37 Dose:  1 applic


Docusate Sodium (Colace Liquid -)  300 mg PO DAILY [facility]


   Last Admin: 01/17/18 11:08 Dose:  Not Given


Gabapentin (Neurontin -)  400 mg PO BID [facility]


   Last Admin: 01/17/18 22:00 Dose:  400 mg


Heparin Sodium (Porcine) (Heparin -)  5,000 unit SQ BID [facility]


   Last Admin: 01/17/18 21:59 Dose:  5,000 unit


Lactobacillus Acidophilus (Bacid -)  2 tab PO DAILY [facility]


   Last Admin: 01/17/18 11:06 Dose:  2 tab


Morphine Sulfate (Morphine Injection -)  4 mg IVPUSH Q4H PRN


   PRN Reason: PAIN LEVEL 4 - 6


   Last Admin: 01/18/18 06:16 Dose:  4 mg


Multivitamins/Minerals/Vitamin C (Tab-A-Vit -)  1 tab PO DAILY [facility]


   Last Admin: 01/17/18 11:07 Dose:  1 tab


Ondansetron HCl (Zofran -)  4 mg PO TID [facility]


   Last Admin: 01/18/18 06:10 Dose:  4 mg


Ranitidine HCl (Zantac -)  150 mg PO DAILY [facility]


   Last Admin: 01/17/18 11:06 Dose:  150 mg


Senna (Senna -)  2 tab PO HS [facility]


   Last Admin: 01/17/18 22:01 Dose:  Not Given


Simethicone (Mylicon -)  160 mg PO QID [facility]


   Last Admin: 01/17/18 22:00 Dose:  80 mg











- Objective


Vital Signs: 


 Vital Signs











Temperature  97.6 F   01/18/18 06:00


 


Pulse Rate  65   01/18/18 06:00


 


Respiratory Rate  18   01/18/18 06:00


 


Blood Pressure  131/65   01/18/18 06:00


 


O2 Sat by Pulse Oximetry (%)  95   01/17/18 21:00











Constitutional: Yes: No Distress, Calm


Cardiovascular: Yes: Regular Rate and Rhythm, S1, S2


Respiratory: Yes: Regular, CTA Bilaterally, Other (coarse at bases)


Gastrointestinal: Yes: Normal Bowel Sounds, Soft.  No: Tenderness


Extremities: Yes: Other (clean right ankle dressing)


Edema: No


Neurological: Yes: Alert, Oriented


Labs: 


 CBC, BMP





 01/18/18 07:30 





 01/18/18 07:30 











- ....Imaging


MRI: Report Reviewed





Problem List





- Problems


(1) Ulcer of right ankle


Code(s): L97.319 - NON-PRESSURE CHRONIC ULCER OF RIGHT ANKLE WITH UNSP SEVERITY

   





(2) Bilateral leg ulcer


Code(s): L97.919 - NON-PRS CHRONIC ULC UNSP PRT OF R LOW LEG W UNSP SEVERITY; 

L97.929 - NON-PRS CHRONIC ULC UNSP PRT OF L LOW LEG W UNSP SEVERITY   





(3) PVD (peripheral vascular disease)


Code(s): I73.9 - PERIPHERAL VASCULAR DISEASE, UNSPECIFIED   





(4) CVA (cerebral vascular accident)


Code(s): I63.9 - CEREBRAL INFARCTION, UNSPECIFIED   





(5) Hypertension


Code(s): I10 - ESSENTIAL (PRIMARY) HYPERTENSION   





(6) Anemia


Code(s): D64.9 - ANEMIA, UNSPECIFIED   





(7) Osteomyelitis


Code(s): M86.9 - OSTEOMYELITIS, UNSPECIFIED   





Assessment/Plan





MRI of the foot is suggesting OM. Likely pt needs bone biopsy.


ID consult, Vasc SX. Podiatry consults appreciated.


Case was d/w pt's nurse and CM.


AM labs
Progress Note, Physician


History of Present Illness: 





Pt w/o fever, chills. Pt with right ankle pain, on and off.


Pt w/o SOB, CP, palp, abd pain.





- Current Medication List


Current Medications: 


Active Medications





Acetaminophen (Tylenol -)  650 mg PO TID [facility]


   Last Admin: 01/16/18 21:08 Dose:  650 mg


Amlodipine Besylate (Norvasc -)  2.5 mg PO DAILY [facility]


   Last Admin: 01/16/18 10:37 Dose:  2.5 mg


Bacitracin (Bacitracin -)  1 applic TP DAILY [facility]


   Last Admin: 01/16/18 10:38 Dose:  1 applic


Celecoxib (Celebrex -)  100 mg PO BID [facility]


   Last Admin: 01/16/18 10:38 Dose:  100 mg


Collagenase (Santyl -)  1 applic TP DAILY [facility]


Docusate Sodium (Colace Liquid -)  300 mg PO HS [facility]


   Last Admin: 01/15/18 22:45 Dose:  300 mg


Gabapentin (Neurontin -)  400 mg PO BID [facility]


   Last Admin: 01/16/18 21:08 Dose:  400 mg


Heparin Sodium (Porcine) (Heparin -)  5,000 unit SQ BID [facility]


   Last Admin: 01/16/18 21:09 Dose:  5,000 unit


Sodium Chloride (Normal Saline -)  500 mls @ 50 mls/hr IV ASDIR [facility]


   Last Admin: 01/15/18 19:41 Dose:  50 mls/hr


Lactobacillus Acidophilus (Bacid -)  2 tab PO DAILY [facility]


   Last Admin: 01/16/18 10:38 Dose:  2 tab


Morphine Sulfate (Morphine Injection -)  4 mg IVPUSH Q4H PRN


   PRN Reason: PAIN LEVEL 4 - 6


   Last Admin: 01/16/18 20:57 Dose:  4 mg


Multivitamins/Minerals/Vitamin C (Tab-A-Vit -)  1 tab PO DAILY [facility]


   Last Admin: 01/16/18 10:37 Dose:  1 tab


Ondansetron HCl (Zofran -)  4 mg PO TID [facility]


   Last Admin: 01/16/18 21:08 Dose:  4 mg


Ranitidine HCl (Zantac -)  150 mg PO DAILY [facility]


   Last Admin: 01/16/18 10:37 Dose:  150 mg


Senna (Senna -)  1 tab PO HS [facility]


   Last Admin: 01/16/18 21:08 Dose:  1 tab


Simethicone (Mylicon -)  160 mg PO QID [facility]


   Last Admin: 01/16/18 21:08 Dose:  160 mg











- Objective


Vital Signs: 


 Vital Signs











Temperature  99.2 F   01/16/18 20:26


 


Pulse Rate  76   01/16/18 20:26


 


Respiratory Rate  18   01/16/18 20:26


 


Blood Pressure  131/93   01/16/18 20:26


 


O2 Sat by Pulse Oximetry (%)  97   01/16/18 18:16











Constitutional: Yes: No Distress, Calm


Cardiovascular: Yes: Regular Rate and Rhythm, S1, S2


Respiratory: Yes: Regular, CTA Bilaterally (except coarse at bases), Rhonchi


Gastrointestinal: Yes: Normal Bowel Sounds, Soft, Tenderness


Extremities: Yes: Other (clean dressing over right ankle ulcer.)


Edema: No


Neurological: Yes: Alert, Oriented


Labs: 


 CBC, BMP





 01/16/18 06:30 





 01/16/18 06:30 











Problem List





- Problems


(1) Ulcer of right ankle


Code(s): L97.319 - NON-PRESSURE CHRONIC ULCER OF RIGHT ANKLE WITH UNSP SEVERITY

   





(2) Bilateral leg ulcer


Code(s): L97.919 - NON-PRS CHRONIC ULC UNSP PRT OF R LOW LEG W UNSP SEVERITY; 

L97.929 - NON-PRS CHRONIC ULC UNSP PRT OF L LOW LEG W UNSP SEVERITY   





(3) Hypertension


Code(s): I10 - ESSENTIAL (PRIMARY) HYPERTENSION   





(4) CVA (cerebral vascular accident)


Code(s): I63.9 - CEREBRAL INFARCTION, UNSPECIFIED   





(5) PVD (peripheral vascular disease)


Code(s): I73.9 - PERIPHERAL VASCULAR DISEASE, UNSPECIFIED   





Assessment/Plan





Abtx per ID- case was discussed with Dr. Wallace


ID consult appreciated


Vasc SX consult  appreciated


Podiatry consult appreciated


AM labs
Progress Note, Physician


History of Present Illness: 





Pt w/o right ankle pain, fever, chills, SOB, CP, abd pain, diarrhea, 

constipation.





- Current Medication List


Current Medications: 


Active Medications





Acetaminophen (Tylenol -)  650 mg PO TID [facility]


   Last Admin: 01/20/18 06:39 Dose:  650 mg


Amlodipine Besylate (Norvasc -)  2.5 mg PO DAILY [facility]


   Last Admin: 01/20/18 11:08 Dose:  2.5 mg


Celecoxib (Celebrex -)  100 mg PO BID [facility]


   Last Admin: 01/20/18 11:08 Dose:  100 mg


Collagenase (Santyl -)  1 applic TP DAILY [facility]


   Last Admin: 01/19/18 15:44 Dose:  1 applic


Docusate Sodium (Colace Liquid -)  300 mg PO DAILY [facility]


   Last Admin: 01/20/18 11:13 Dose:  Not Given


Gabapentin (Neurontin -)  400 mg PO BID [facility]


   Last Admin: 01/20/18 11:08 Dose:  400 mg


Heparin Sodium (Porcine) (Heparin -)  5,000 unit SQ BID [facility]


   Last Admin: 01/20/18 11:10 Dose:  5,000 unit


Lactobacillus Acidophilus (Bacid -)  2 tab PO DAILY [facility]


   Last Admin: 01/20/18 11:07 Dose:  2 tab


Morphine Sulfate (Morphine Injection -)  4 mg IVPUSH Q4H PRN


   PRN Reason: PAIN LEVEL 4 - 6


   Last Admin: 01/20/18 11:11 Dose:  4 mg


Multivitamins/Minerals/Vitamin C (Tab-A-Vit -)  1 tab PO DAILY [facility]


   Last Admin: 01/20/18 11:09 Dose:  1 tab


Ondansetron HCl (Zofran -)  4 mg PO TID [facility]


   Last Admin: 01/20/18 06:39 Dose:  4 mg


Ranitidine HCl (Zantac -)  150 mg PO DAILY [facility]


   Last Admin: 01/20/18 11:10 Dose:  150 mg


Senna (Senna -)  2 tab PO HS [facility]


   Last Admin: 01/19/18 21:42 Dose:  2 tab


Simethicone (Mylicon -)  160 mg PO QID [facility]


   Last Admin: 01/20/18 11:09 Dose:  80 mg











- Objective


Vital Signs: 


 Vital Signs











Temperature  98 F   01/20/18 11:28


 


Pulse Rate  75   01/20/18 11:28


 


Respiratory Rate  18   01/20/18 11:28


 


Blood Pressure  150/70   01/20/18 11:28


 


O2 Sat by Pulse Oximetry (%)  95   01/20/18 03:58











Constitutional: Yes: No Distress, Calm


Cardiovascular: Yes: Regular Rate and Rhythm, S1, S2


Respiratory: Yes: Regular, CTA Bilaterally, Other (coarse BS)


Gastrointestinal: Yes: Normal Bowel Sounds, Soft.  No: Tenderness


Edema: No


Neurological: Yes: Alert, Oriented


Labs: 


 CBC, BMP





 01/20/18 08:00 





 01/20/18 08:00 











Problem List





- Problems


(1) Ulcer of right ankle


Code(s): L97.319 - NON-PRESSURE CHRONIC ULCER OF RIGHT ANKLE WITH UNSP SEVERITY

   





(2) Bilateral leg ulcer


Code(s): L97.919 - NON-PRS CHRONIC ULC UNSP PRT OF R LOW LEG W UNSP SEVERITY; 

L97.929 - NON-PRS CHRONIC ULC UNSP PRT OF L LOW LEG W UNSP SEVERITY   





(3) PVD (peripheral vascular disease)


Code(s): I73.9 - PERIPHERAL VASCULAR DISEASE, UNSPECIFIED   





(4) CVA (cerebral vascular accident)


Code(s): I63.9 - CEREBRAL INFARCTION, UNSPECIFIED   





(5) Hypertension


Code(s): I10 - ESSENTIAL (PRIMARY) HYPERTENSION   





(6) Hypoalbuminemia


Code(s): E88.09 - OTH DISORDERS OF PLASMA-PROTEIN METABOLISM, NEC   





(7) Anemia


Code(s): D64.9 - ANEMIA, UNSPECIFIED   





(8) Osteomyelitis


Code(s): M86.9 - OSTEOMYELITIS, UNSPECIFIED   





Assessment/Plan








ID, Vasc SX, Podiatry consults appreciated.


Pending foot Bx on Monday. 


ECHO report was reviewed


Probable pt would need PICC line and long term abtx treatment.


Case was reviewed with pt's nurse
Progress Note, Physician


History of Present Illness: 





pulmonary





alert,comfortable,-resp distress,less cough





- Current Medication List


Current Medications: 


Active Medications





Acetaminophen (Tylenol -)  650 mg PO TID [facility]


   Last Admin: 01/25/18 13:59 Dose:  650 mg


Albuterol Sulfate (Ventolin 0.083% Nebulizer Soln -)  1 amp NEB Q4H PRN


   PRN Reason: SHORT OF BREATH/WHEEZING


Albuterol/Ipratropium (Duoneb -)  1 amp NEB RQID [facility]


   Last Admin: 01/25/18 11:10 Dose:  1 amp


Amlodipine Besylate (Norvasc -)  2.5 mg PO DAILY [facility]


   Last Admin: 01/25/18 10:27 Dose:  2.5 mg


Celecoxib (Celebrex -)  100 mg PO BID [facility]


   Last Admin: 01/25/18 10:29 Dose:  100 mg


Collagenase (Santyl -)  1 applic TP DAILY [facility]


   Last Admin: 01/25/18 12:23 Dose:  1 applic


Docusate Sodium (Colace -)  300 mg PO DAILY [facility]


   Last Admin: 01/25/18 10:27 Dose:  300 mg


Gabapentin (Neurontin -)  400 mg PO BID [facility]


   Last Admin: 01/25/18 10:27 Dose:  400 mg


Heparin Sodium (Porcine) (Heparin -)  5,000 unit SQ BID [facility]


   Last Admin: 01/25/18 10:28 Dose:  5,000 unit


Hydromorphone HCl (Dilaudid -)  2 mg PO HS PRN


   PRN Reason: PAIN LEVEL 6-10


   Last Admin: 01/24/18 21:58 Dose:  2 mg


Hydromorphone HCl (Dilaudid -)  4 mg PO Q3H PRN


   PRN Reason: PAIN LEVEL 4 - 6


   Last Admin: 01/25/18 12:01 Dose:  4 mg


Dextrose/Sodium Chloride (D5-Ns -)  1,000 mls @ 50 mls/hr IV ASDIR [facility]


   Last Admin: 01/24/18 16:36 Dose:  Not Given


Vancomycin HCl 1,000 mg/ (Dextrose)  250 mls @ 166.667 mls/hr IVPB DAILY@1800 

[facility]


   PRN Reason: Protocol


   Last Admin: 01/24/18 17:14 Dose:  166.667 mls/hr


Lactobacillus Acidophilus (Bacid -)  2 tab PO DAILY [facility]


   Last Admin: 01/25/18 10:29 Dose:  2 tab


Methylprednisolone Sodium Succinate (Solu-Medrol -)  40 mg IVPUSH Q8H-IV [facility]


   Last Admin: 01/25/18 10:02 Dose:  40 mg


Multivitamins/Minerals/Vitamin C (Tab-A-Vit -)  1 tab PO DAILY [facility]


   Last Admin: 01/25/18 10:29 Dose:  1 tab


Ondansetron HCl (Zofran -)  4 mg PO TID [facility]


   Last Admin: 01/25/18 13:59 Dose:  4 mg


Ondansetron HCl (Zofran Injection)  4 mg IVPUSH Q6H PRN


   PRN Reason: NAUSEA AND/OR VOMITING


Promethazine HCl (Phenergan Injection -)  12.5 mg IVPB Q6H PRN


   PRN Reason: NAUSEA-FOR RESCUE AFTER 15 MIN


Ranitidine HCl (Zantac -)  150 mg PO DAILY [facility]


   Last Admin: 01/25/18 10:29 Dose:  150 mg


Senna (Senna -)  2 tab PO HS [facility]


   Last Admin: 01/24/18 21:09 Dose:  Not Given


Simethicone (Mylicon -)  160 mg PO QID [facility]


   Last Admin: 01/25/18 13:56 Dose:  80 mg











- Objective


Vital Signs: 


 Vital Signs











Temperature  97.6 F   01/25/18 14:00


 


Pulse Rate  76   01/25/18 14:00


 


Respiratory Rate  20   01/25/18 14:00


 


Blood Pressure  126/71   01/25/18 14:00


 


O2 Sat by Pulse Oximetry (%)  97   01/25/18 09:00











Constitutional: Yes: Calm, Thin


Eyes: Yes: WNL


HENT: Yes: WNL


Neck: Yes: WNL


Cardiovascular: Yes: Regular Rate and Rhythm, S1, S2


Respiratory: Yes: Rales (bibasilar rales), Wheezes (few wheezes)


Gastrointestinal: Yes: Normal Bowel Sounds, Soft


Extremities: Yes: WNL


Edema: No


Labs: 


 CBC, BMP





 01/25/18 08:00 





 01/25/18 08:00 





 INR, PTT











INR  1.16  (0.82-1.09)  H  01/22/18  07:15    














Problem List





- Problems


(1) Bronchospasm


Code(s): J98.01 - ACUTE BRONCHOSPASM   





(2) Cough


Code(s): R05 - COUGH   





(3) Osteomyelitis


Code(s): M86.9 - OSTEOMYELITIS, UNSPECIFIED   





Assessment/Plan





A/P


Osteomyelitis


r/o Acute Bronchospasm





-  continue standing nebulizers


-  medrol q12


-  O2 to keep SpO2 >90%


-  incentive spirometry


-  antibiotics per ID


-  outpt PFTs


-  DVT prophylaxis





DR HOWE
Progress Note, Physician


History of Present Illness: 





pulmonary





alert,no distress,+moist cough,





- Current Medication List


Current Medications: 


Active Medications





Acetaminophen (Tylenol -)  650 mg PO TID [facility]


   Last Admin: 01/24/18 06:31 Dose:  650 mg


Albuterol Sulfate (Ventolin 0.083% Nebulizer Soln -)  1 amp NEB Q4H PRN


   PRN Reason: SHORT OF BREATH/WHEEZING


Albuterol/Ipratropium (Duoneb -)  1 amp NEB RQID [facility]


   Last Admin: 01/24/18 11:10 Dose:  1 amp


Amlodipine Besylate (Norvasc -)  2.5 mg PO DAILY [facility]


   Last Admin: 01/24/18 11:51 Dose:  2.5 mg


Celecoxib (Celebrex -)  100 mg PO BID [facility]


   Last Admin: 01/24/18 11:52 Dose:  100 mg


Collagenase (Santyl -)  1 applic TP DAILY [facility]


   Last Admin: 01/24/18 12:04 Dose:  1 applic


Docusate Sodium (Colace -)  300 mg PO DAILY [facility]


   Last Admin: 01/24/18 11:53 Dose:  300 mg


Gabapentin (Neurontin -)  400 mg PO BID [facility]


   Last Admin: 01/24/18 11:52 Dose:  400 mg


Heparin Sodium (Porcine) (Heparin -)  5,000 unit SQ BID [facility]


   Last Admin: 01/24/18 11:51 Dose:  5,000 unit


Hydromorphone HCl (Dilaudid -)  2 mg PO HS PRN


   PRN Reason: PAIN LEVEL 6-10


Hydromorphone HCl (Dilaudid -)  4 mg PO Q4H PRN


   PRN Reason: PAIN LEVEL 4 - 6


   Last Admin: 01/24/18 11:54 Dose:  4 mg


Dextrose/Sodium Chloride (D5-Ns -)  1,000 mls @ 50 mls/hr IV ASDIR [facility]


   Last Admin: 01/23/18 17:24 Dose:  Not Given


Vancomycin HCl 1,000 mg/ (Dextrose)  250 mls @ 166.667 mls/hr IVPB DAILY@1800 

[facility]


   PRN Reason: Protocol


   Last Admin: 01/23/18 17:24 Dose:  166.667 mls/hr


Lactobacillus Acidophilus (Bacid -)  2 tab PO DAILY [facility]


   Last Admin: 01/24/18 11:53 Dose:  2 tab


Methylprednisolone Sodium Succinate (Solu-Medrol -)  40 mg IVPUSH Q8H-IV [facility]


   Last Admin: 01/24/18 11:54 Dose:  40 mg


Multivitamins/Minerals/Vitamin C (Tab-A-Vit -)  1 tab PO DAILY [facility]


   Last Admin: 01/24/18 11:51 Dose:  1 tab


Ondansetron HCl (Zofran -)  4 mg PO TID [facility]


   Last Admin: 01/24/18 06:31 Dose:  4 mg


Ondansetron HCl (Zofran Injection)  4 mg IVPUSH Q6H PRN


   PRN Reason: NAUSEA AND/OR VOMITING


Promethazine HCl (Phenergan Injection -)  12.5 mg IVPB Q6H PRN


   PRN Reason: NAUSEA-FOR RESCUE AFTER 15 MIN


Ranitidine HCl (Zantac -)  150 mg PO DAILY [facility]


   Last Admin: 01/24/18 11:54 Dose:  150 mg


Senna (Senna -)  2 tab PO HS [facility]


   Last Admin: 01/23/18 21:38 Dose:  2 tab


Simethicone (Mylicon -)  160 mg PO QID [facility]


   Last Admin: 01/24/18 11:51 Dose:  160 mg











- Objective


Vital Signs: 


 Vital Signs











Temperature  98.8 F   01/24/18 14:00


 


Pulse Rate  94 H  01/24/18 14:00


 


Respiratory Rate  22   01/24/18 14:00


 


Blood Pressure  127/68   01/24/18 14:00


 


O2 Sat by Pulse Oximetry (%)  96   01/23/18 20:15











Constitutional: Yes: Well Nourished, Calm


Eyes: Yes: WNL


HENT: Yes: WNL


Neck: Yes: WNL


Cardiovascular: Yes: Regular Rate and Rhythm, S1, S2


Respiratory: Yes: Rhonchi (few rhonchi)


Gastrointestinal: Yes: Normal Bowel Sounds, Soft


Extremities: Yes: WNL


Edema: No


Labs: 


 CBC, BMP





 01/24/18 06:30 





 01/24/18 06:30 





 INR, PTT











INR  1.16  (0.82-1.09)  H  01/22/18  07:15    














- ....Imaging


Chest X-ray: Report Reviewed, Image Reviewed





Problem List





- Problems


(1) Bronchospasm


Code(s): J98.01 - ACUTE BRONCHOSPASM   





(2) Cough


Code(s): R05 - COUGH   





(3) Osteomyelitis


Code(s): M86.9 - OSTEOMYELITIS, UNSPECIFIED   





Assessment/Plan





A/P


Osteomyelitis


r/o Acute Bronchospasm





-  continue standing nebulizers


-   medrol x 48 hrs and then reassess


-  O2 to keep SpO2 >90%


-  incentive spirometry


-  antibiotics per ID


-  outpt PFTs


-  DVT prophylaxis





DR OHWE
Family

## 2025-05-27 NOTE — PN
Progress Note (short form)





- Note


Progress Note: 


Ortho





Pt seen and examined s/p right nondisplaced MFC fx





knee immobilizer intact, no signs of skin breakdown, decr swelling, decr ttp


calf soft, nt, nvi





a/p


keep immobilizer in place


please check for skin breakdown


NWB


dvt ppx


pain control


d/w Dr. Watts
Progress Note (short form)





- Note


Progress Note: 


Ortho





Pt seen and examined s/p right nondisplaced MFC fx





knee immobilizer intact, no signs of skin breakdown, decr swelling, decr ttp


calf soft, nt, nvi





a/p


keep immobilizer in place


please check for skin breakdown


PT eval


NWB


dvt ppx


pain control


d/w Dr. Marinelli
Progress Note, Physician


History of Present Illness: 


Pt w/o motor or sensory deficit in UE/ LE/ face.


Pt w/o fever, chills,  sore throat, SOB, cough, abd pain, N, V, diarrhea, 

dysuria








- Current Medication List


Current Medications: 


Active Medications





Acetaminophen (Tylenol -)  650 mg PO Q6H PRN


   PRN Reason: FEVER OR PAIN


   Last Admin: 08/05/17 22:04 Dose:  650 mg


Amlodipine Besylate (Norvasc -)  2.5 mg PO DAILY [facility]


   Last Admin: 08/09/17 09:41 Dose:  2.5 mg


Ascorbic Acid (Vitamin C -)  500 mg PO DAILY [facility]


   Last Admin: 08/09/17 09:41 Dose:  500 mg


Bacitracin (Bacitracin -)  1 applic TP DAILY [facility]


   Last Admin: 08/09/17 09:38 Dose:  1 applic


Celecoxib (Celebrex -)  100 mg PO BID [facility]


   Last Admin: 08/08/17 21:43 Dose:  100 mg


Gabapentin (Neurontin -)  400 mg PO Q8H [facility]


   Last Admin: 08/09/17 05:46 Dose:  400 mg


Gentamicin Sulfate (Garamycin 0.1% Cream -)  1 applic TP DAILY [facility]


   Last Admin: 08/09/17 09:39 Dose:  1 applic


Heparin Sodium (Porcine) (Heparin -)  5,000 unit SQ BID [facility]


   Last Admin: 08/09/17 09:40 Dose:  5,000 unit


Hydromorphone HCl (Dilaudid -)  2 mg PO Q4H [facility]


Hydromorphone HCl (Dilaudid -)  2 mg PO Q4H PRN


   PRN Reason: PAIN


Ondansetron HCl (Zofran Injection)  4 mg IVPB Q8H PRN


   PRN Reason: NAUSEA AND/OR VOMITING


Potassium Chloride (Potassium Chloride Oral Liquid)  40 meq PO ONCE ONE


   Stop: 08/09/17 12:03


Senna (Senna -)  2 tab PO HS [facility]


   Last Admin: 08/08/17 21:05 Dose:  2 tab


Simethicone (Mylicon -)  80 mg PO QID [facility]


   Last Admin: 08/09/17 09:41 Dose:  80 mg











- Objective


Vital Signs: 


 Vital Signs











Temperature  98.2 F   08/09/17 05:43


 


Pulse Rate  85   08/09/17 05:43


 


Respiratory Rate  16   08/09/17 05:43


 


Blood Pressure  170/87   08/09/17 05:43


 


O2 Sat by Pulse Oximetry (%)  95   08/08/17 21:00











Constitutional: Yes: No Distress, Calm


Cardiovascular: Yes: Regular Rate and Rhythm, S1, S2


Respiratory: Yes: Regular, CTA Bilaterally, Rales


Gastrointestinal: Yes: Normal Bowel Sounds, Soft.  No: Tenderness


Edema: No


Neurological: Yes: Alert, Oriented


Labs: 


 CBC, BMP





 08/09/17 05:20 





 08/09/17 05:20 





 INR, PTT











INR  0.98  (0.82-1.09)   08/04/17  21:59    














Problem List





- Problems


(1) Knee fracture, right


Code(s): [id number] - 





(2) ICB (intracranial bleed)


Code(s): I62.9 - NONTRAUMATIC INTRACRANIAL HEMORRHAGE, UNSPECIFIED





(3) CVA (cerebral vascular accident)


Code(s): I63.9 - CEREBRAL INFARCTION, UNSPECIFIED   





(4) Bilateral leg ulcer


Code(s): L97.919 - NON-PRS CHRONIC ULC UNSP PRT OF R LOW LEG W UNSP SEVERITY


L97.929 - NON-PRS CHRONIC ULC UNSP PRT OF L LOW LEG W UNSP SEVERITY   





(5) Leukocytosis


Code(s): D72.829 - ELEVATED WHITE BLOOD CELL COUNT, UNSPECIFIED   





(6) Diarrhea


Code(s): R19.7 - DIARRHEA, UNSPECIFIED   








Assessment/Plan


Neuro SX consult appreciated (recommended medical treatment); pt can start SQ 

Heparin for DVT prophylasis


Ortho consult appreciated (conservative management)


PT evaluation


Replete K


To change Dilaudid to PO


Monitor BCX
Progress Note, Physician


History of Present Illness: 


Pt w/o motor or sensory deficit in UE/ LE/ face.


Pt w/o fever, chills,  sore throat, SOB, cough, abd pain, N, V, dysuria


Pt w/o diarrhea





- Current Medication List


Current Medications: 


Active Medications





Acetaminophen (Tylenol -)  650 mg PO Q6H PRN


   PRN Reason: FEVER OR PAIN


   Last Admin: 08/05/17 22:04 Dose:  650 mg


Amlodipine Besylate (Norvasc -)  2.5 mg PO [facility]ILY [facility]


   Last Admin: 08/08/17 10:28 Dose:  2.5 mg


Ascorbic Acid (Vitamin C -)  500 mg PO DAILY [facility]


   Last Admin: 08/08/17 10:28 Dose:  500 mg


Bacitracin (Bacitracin -)  1 applic TP DAILY [facility]


   Last Admin: 08/08/17 10:29 Dose:  1 applic


Celecoxib (Celebrex -)  100 mg PO BID [facility]


   Last Admin: 08/08/17 21:43 Dose:  100 mg


Gabapentin (Neurontin -)  400 mg PO Q8H [facility]


   Last Admin: 08/08/17 21:05 Dose:  400 mg


Gentamicin Sulfate (Garamycin 0.1% Cream -)  1 applic TP DAILY [facility]


   Last Admin: 08/08/17 11:00 Dose:  1 applic


Heparin Sodium (Porcine) (Heparin -)  5,000 unit SQ BID [facility]


   Last Admin: 08/08/17 21:04 Dose:  5,000 unit


Hydromorphone HCl (Dilaudid Injection -)  2 mg IVPUSH Q3H PRN


   PRN Reason: PAIN


   Last Admin: 08/08/17 18:24 Dose:  2 mg


Ondansetron HCl (Zofran Injection)  4 mg IVPB Q8H PRN


   PRN Reason: NAUSEA AND/OR VOMITING


Senna (Senna -)  2 tab PO HS [facility]


   Last Admin: 08/08/17 21:05 Dose:  2 tab


Simethicone (Mylicon -)  80 mg PO QID [facility]


   Last Admin: 08/08/17 21:05 Dose:  80 mg











- Objective


Vital Signs: 


 Vital Signs











Temperature  98.7 F   08/08/17 21:47


 


Pulse Rate  85   08/08/17 21:47


 


Respiratory Rate  18   08/08/17 21:47


 


Blood Pressure  151/68   08/08/17 21:47


 


O2 Sat by Pulse Oximetry (%)  95   08/08/17 09:00











Constitutional: Yes: No Distress, Calm


Cardiovascular: Yes: Regular Rate and Rhythm, S1, S2


Respiratory: Yes: Regular, CTA Bilaterally.  No: Rales


Gastrointestinal: Yes: Normal Bowel Sounds, Soft.  No: Tenderness


Edema: No


Labs: 


 CBC, BMP





 08/08/17 05:15 





 08/07/17 05:15 





 INR, PTT











INR  0.98  (0.82-1.09)   08/04/17  21:59    














Problem List





- Problems


(1) Knee fracture, right


Code(s): [id number] - 





(2) ICB (intracranial bleed)


Code(s): I62.9 - NONTRAUMATIC INTRACRANIAL HEMORRHAGE, UNSPECIFIED





(3) CVA (cerebral vascular accident)


Code(s): I63.9 - CEREBRAL INFARCTION, UNSPECIFIED   





(4) Bilateral leg ulcer


Code(s): L97.919 - NON-PRS CHRONIC ULC UNSP PRT OF R LOW LEG W UNSP SEVERITY


L97.929 - NON-PRS CHRONIC ULC UNSP PRT OF L LOW LEG W UNSP SEVERITY   





(5) Leukocytosis


Code(s): D72.829 - ELEVATED WHITE BLOOD CELL COUNT, UNSPECIFIED   





(6) Diarrhea


Code(s): R19.7 - DIARRHEA, UNSPECIFIED   








Assessment/Plan


Neuro SX consult appreciated (recommended medical treatment); pt can start SQ 

Heparin for DVT prophylasis


Ortho consult appreciated (conservative management)


Pt with Leukocytosis yesterday, this AM WBC is w/i NL; to monitor


PT evaluation reviewed


Monitor BCX
Progress Note, Physician


History of Present Illness: 


Pt w/o motor or sensory deficit in UE/ LE/ face.


Pt w/o fever, chills,  sore throat, SOB, cough, abd pain, N, V, dysuria


Pt with diarrhea





- Current Medication List


Current Medications: 


Active Medications





Acetaminophen (Tylenol -)  650 mg PO Q6H PRN


   PRN Reason: FEVER OR PAIN


   Last Admin: 08/05/17 22:04 Dose:  650 mg


Amlodipine Besylate (Norvasc -)  2.5 mg PO DAILY [facility]


   Last Admin: 08/07/17 10:37 Dose:  2.5 mg


Ascorbic Acid (Vitamin C -)  500 mg PO DAILY [facility]


   Last Admin: 08/07/17 10:37 Dose:  500 mg


Bacitracin (Bacitracin -)  1 applic TP DAILY [facility]


   Last Admin: 08/07/17 10:37 Dose:  1 applic


Celecoxib (Celebrex -)  100 mg PO BID [facility]


   Last Admin: 08/07/17 21:29 Dose:  100 mg


Gabapentin (Neurontin -)  400 mg PO Q8H [facility]


   Last Admin: 08/07/17 21:29 Dose:  400 mg


Gentamicin Sulfate (Garamycin 0.1% Cream -)  1 applic TP DAILY [facility]


   Last Admin: 08/07/17 10:37 Dose:  1 applic


Heparin Sodium (Porcine) (Heparin -)  5,000 unit SQ BID [facility]


   Last Admin: 08/07/17 13:14 Dose:  5,000 unit


Hydromorphone HCl (Dilaudid Injection -)  2 mg IVPUSH Q4H PRN


   PRN Reason: PAIN


   Last Admin: 08/07/17 01:06 Dose:  2 mg


Sodium Chloride (Normal Saline -)  1,000 mls @ 100 mls/hr IV ASDIR [facility]


   Last Admin: 08/07/17 13:11 Dose:  100 mls/hr


Ondansetron HCl (Zofran Injection)  4 mg IVPB Q8H PRN


   PRN Reason: NAUSEA AND/OR VOMITING


Senna (Senna -)  2 tab PO HS [facility]


   Last Admin: 08/07/17 21:45 Dose:  Not Given


Simethicone (Mylicon -)  80 mg PO QID [facility]


   Last Admin: 08/07/17 21:29 Dose:  80 mg











- Objective


Vital Signs: 


 Vital Signs











Temperature  99.5 F   08/07/17 18:00


 


Pulse Rate  95 H  08/07/17 18:00


 


Respiratory Rate  18   08/07/17 18:00


 


Blood Pressure  127/54   08/07/17 18:00


 


O2 Sat by Pulse Oximetry (%)  100   08/07/17 10:26











Constitutional: Yes: No Distress, Calm


Cardiovascular: Yes: Regular Rate and Rhythm, S1, S2


Respiratory: Yes: Regular, CTA Bilaterally, Rales


Gastrointestinal: Yes: Normal Bowel Sounds, Soft


Edema: No


Neurological: Yes: Alert, Oriented


Labs: 


 CBC, BMP





 08/07/17 18:00 





 08/07/17 05:15 





 INR, PTT











INR  0.98  (0.82-1.09)   08/04/17  21:59    








AM WBC of 12





Problem List





- Problems


(1) Knee fracture, right


Code(s): OAH2784 - 





(2) ICB (intracranial bleed)


Code(s): I62.9 - NONTRAUMATIC INTRACRANIAL HEMORRHAGE, UNSPECIFIED





(3) CVA (cerebral vascular accident)


Code(s): I63.9 - CEREBRAL INFARCTION, UNSPECIFIED   





(4) Bilateral leg ulcer


Code(s): L97.919 - NON-PRS CHRONIC ULC UNSP PRT OF R LOW LEG W UNSP SEVERITY


L97.929 - NON-PRS CHRONIC ULC UNSP PRT OF L LOW LEG W UNSP SEVERITY   





(5) Leukocytosis


Code(s): D72.829 - ELEVATED WHITE BLOOD CELL COUNT, UNSPECIFIED   





(6) Diarrhea


Code(s): R19.7 - DIARRHEA, UNSPECIFIED   








Assessment/Plan


Neuro SX consult appreciated (for ICB medical treatment)


Ortho consult appreciated (conservative management)


Repeated CBC shows noraml WBC


Send stool for C diff


PT


Monitor BCX
Progress Note, Physician


History of Present Illness: 


Pt w/o motor or sensory deficit in UE/ LE/ face.


Pt w/o fever, chills, SOB, cough, abd pain, N, V, Diarrhea, sore throat





- Current Medication List


Current Medications: 


Active Medications





Acetaminophen (Tylenol -)  650 mg PO Q6H PRN


   PRN Reason: FEVER OR PAIN


   Last Admin: 08/05/17 22:04 Dose:  650 mg


Amlodipine Besylate (Norvasc -)  2.5 mg PO DAILY [facility]


   Last Admin: 08/06/17 10:47 Dose:  2.5 mg


Ascorbic Acid (Vitamin C -)  500 mg PO DAILY [facility]


   Last Admin: 08/06/17 10:47 Dose:  500 mg


Bacitracin (Bacitracin -)  1 applic TP DAILY [facility]


   Last Admin: 08/05/17 17:24 Dose:  1 applic


Celecoxib (Celebrex -)  100 mg PO BID [facility]


   Last Admin: 08/06/17 10:47 Dose:  100 mg


Gabapentin (Neurontin -)  400 mg PO Q8H [facility]


   Last Admin: 08/06/17 06:16 Dose:  400 mg


Gentamicin Sulfate (Garamycin 0.1% Cream -)  1 applic TP DAILY [facility]


   Last Admin: 08/05/17 17:23 Dose:  1 applic


Hydromorphone HCl (Dilaudid Injection -)  4 mg IVPUSH Q6H PRN


   PRN Reason: PAIN


   Last Admin: 08/06/17 12:22 Dose:  4 mg


Sodium Chloride (Normal Saline -)  1,000 mls @ 100 mls/hr IV ASDIR [facility]


   Last Admin: 08/05/17 21:00 Dose:  100 mls/hr


Ondansetron HCl (Zofran Injection)  4 mg IVPB Q8H PRN


   PRN Reason: NAUSEA AND/OR VOMITING


Senna (Senna -)  2 tab PO HS [facility]


   Last Admin: 08/05/17 21:51 Dose:  2 tab


Simethicone (Mylicon -)  80 mg PO QID [facility]


   Last Admin: 08/06/17 10:47 Dose:  80 mg











- Objective


Vital Signs: 


 Vital Signs











Temperature  98.1 F   08/06/17 10:00


 


Pulse Rate  68   08/06/17 10:00


 


Respiratory Rate  18   08/06/17 10:00


 


Blood Pressure  102/53   08/06/17 10:00


 


O2 Sat by Pulse Oximetry (%)  97   08/06/17 10:00











Constitutional: Yes: No Distress, Calm


Cardiovascular: Yes: Regular Rate and Rhythm, S1, S2


Respiratory: Yes: Regular, CTA Bilaterally.  No: Rales


Gastrointestinal: Yes: Normal Bowel Sounds, Soft.  No: Palpable Mass, Tenderness


Edema: No


Integumentary: Yes: Rash


Neurological: Yes: Alert, Oriented


Labs: 


 CBC, BMP





 08/06/17 05:15 





 08/06/17 05:15 





 INR, PTT











INR  0.98  (0.82-1.09)   08/04/17  21:59    














Problem List





- Problems


(1) Knee fracture, right


Code(s): UUW1021 - 





(2) ICB (intracranial bleed)


Code(s): I62.9 - NONTRAUMATIC INTRACRANIAL HEMORRHAGE, UNSPECIFIED





(3) CVA (cerebral vascular accident)


Code(s): I63.9 - CEREBRAL INFARCTION, UNSPECIFIED   





(4) Bilateral leg ulcer


Code(s): L97.919 - NON-PRS CHRONIC ULC UNSP PRT OF R LOW LEG W UNSP SEVERITY


L97.929 - NON-PRS CHRONIC ULC UNSP PRT OF L LOW LEG W UNSP SEVERITY   








Assessment/Plan


Neuro SX consult appreciated (for ICB medical treatment)


Ortho consult appreciated (conservative management)


Repeate labs


PT


Monitor BCX
9.13 (Voluntary)